# Patient Record
Sex: FEMALE | Race: WHITE | NOT HISPANIC OR LATINO | Employment: FULL TIME
[De-identification: names, ages, dates, MRNs, and addresses within clinical notes are randomized per-mention and may not be internally consistent; named-entity substitution may affect disease eponyms.]

---

## 2017-02-01 ENCOUNTER — RECORDS - HEALTHEAST (OUTPATIENT)
Dept: ADMINISTRATIVE | Facility: OTHER | Age: 27
End: 2017-02-01

## 2018-12-28 ENCOUNTER — RECORDS - HEALTHEAST (OUTPATIENT)
Dept: LAB | Facility: CLINIC | Age: 28
End: 2018-12-28

## 2018-12-29 LAB
ALBUMIN SERPL-MCNC: 3.8 G/DL (ref 3.5–5)
ALP SERPL-CCNC: 73 U/L (ref 45–120)
ALT SERPL W P-5'-P-CCNC: 31 U/L (ref 0–45)
AST SERPL W P-5'-P-CCNC: 21 U/L (ref 0–40)
BILIRUB DIRECT SERPL-MCNC: 0.2 MG/DL
BILIRUB SERPL-MCNC: 0.5 MG/DL (ref 0–1)
PROT SERPL-MCNC: 6.8 G/DL (ref 6–8)

## 2019-01-02 ENCOUNTER — RECORDS - HEALTHEAST (OUTPATIENT)
Dept: LAB | Facility: CLINIC | Age: 29
End: 2019-01-02

## 2019-01-02 LAB
ALBUMIN SERPL-MCNC: 3.8 G/DL (ref 3.5–5)
ALP SERPL-CCNC: 77 U/L (ref 45–120)
ALT SERPL W P-5'-P-CCNC: 28 U/L (ref 0–45)
ANION GAP SERPL CALCULATED.3IONS-SCNC: 11 MMOL/L (ref 5–18)
AST SERPL W P-5'-P-CCNC: 19 U/L (ref 0–40)
BILIRUB SERPL-MCNC: 0.5 MG/DL (ref 0–1)
BUN SERPL-MCNC: 9 MG/DL (ref 8–22)
CALCIUM SERPL-MCNC: 9.4 MG/DL (ref 8.5–10.5)
CHLORIDE BLD-SCNC: 108 MMOL/L (ref 98–107)
CO2 SERPL-SCNC: 22 MMOL/L (ref 22–31)
CREAT SERPL-MCNC: 0.85 MG/DL (ref 0.6–1.1)
GFR SERPL CREATININE-BSD FRML MDRD: >60 ML/MIN/1.73M2
GLUCOSE BLD-MCNC: 99 MG/DL (ref 70–125)
POTASSIUM BLD-SCNC: 4.4 MMOL/L (ref 3.5–5)
PROT SERPL-MCNC: 6.9 G/DL (ref 6–8)
SODIUM SERPL-SCNC: 141 MMOL/L (ref 136–145)

## 2019-03-10 ENCOUNTER — RECORDS - HEALTHEAST (OUTPATIENT)
Dept: LAB | Facility: CLINIC | Age: 29
End: 2019-03-10

## 2019-03-12 LAB — BACTERIA SPEC CULT: ABNORMAL

## 2019-03-21 ENCOUNTER — RECORDS - HEALTHEAST (OUTPATIENT)
Dept: LAB | Facility: CLINIC | Age: 29
End: 2019-03-21

## 2019-03-24 LAB — BACTERIA SPEC CULT: ABNORMAL

## 2020-02-03 ENCOUNTER — RECORDS - HEALTHEAST (OUTPATIENT)
Dept: LAB | Facility: CLINIC | Age: 30
End: 2020-02-03

## 2020-02-03 LAB
ALBUMIN SERPL-MCNC: 3.9 G/DL (ref 3.5–5)
ALP SERPL-CCNC: 73 U/L (ref 45–120)
ALT SERPL W P-5'-P-CCNC: 25 U/L (ref 0–45)
ANION GAP SERPL CALCULATED.3IONS-SCNC: 7 MMOL/L (ref 5–18)
AST SERPL W P-5'-P-CCNC: 20 U/L (ref 0–40)
BILIRUB SERPL-MCNC: 0.5 MG/DL (ref 0–1)
BUN SERPL-MCNC: 7 MG/DL (ref 8–22)
CALCIUM SERPL-MCNC: 9.4 MG/DL (ref 8.5–10.5)
CHLORIDE BLD-SCNC: 104 MMOL/L (ref 98–107)
CHOLEST SERPL-MCNC: 131 MG/DL
CO2 SERPL-SCNC: 27 MMOL/L (ref 22–31)
CREAT SERPL-MCNC: 0.78 MG/DL (ref 0.6–1.1)
FASTING STATUS PATIENT QL REPORTED: NO
GFR SERPL CREATININE-BSD FRML MDRD: >60 ML/MIN/1.73M2
GLUCOSE BLD-MCNC: 82 MG/DL (ref 70–125)
HDLC SERPL-MCNC: 44 MG/DL
LDLC SERPL CALC-MCNC: 75 MG/DL
POTASSIUM BLD-SCNC: 4.1 MMOL/L (ref 3.5–5)
PROT SERPL-MCNC: 7 G/DL (ref 6–8)
SODIUM SERPL-SCNC: 138 MMOL/L (ref 136–145)
TRIGL SERPL-MCNC: 62 MG/DL

## 2020-02-04 LAB — 25(OH)D3 SERPL-MCNC: 12.4 NG/ML (ref 30–80)

## 2020-02-05 LAB
BKR LAB AP ABNORMAL BLEEDING: NO
BKR LAB AP BIRTH CONTROL/HORMONES: NORMAL
BKR LAB AP CERVICAL APPEARANCE: NORMAL
BKR LAB AP GYN ADEQUACY: NORMAL
BKR LAB AP GYN INTERPRETATION: NORMAL
BKR LAB AP GYN OTHER FINDINGS: NORMAL
BKR LAB AP HPV REFLEX: NORMAL
BKR LAB AP LMP: NORMAL
BKR LAB AP PATIENT STATUS: NORMAL
BKR LAB AP PREVIOUS ABNORMAL: NORMAL
BKR LAB AP PREVIOUS NORMAL: NORMAL
HIGH RISK?: NO
PATH REPORT.COMMENTS IMP SPEC: NORMAL
RESULT FLAG (HE HISTORICAL CONVERSION): NORMAL

## 2020-02-27 ENCOUNTER — RECORDS - HEALTHEAST (OUTPATIENT)
Dept: ADMINISTRATIVE | Facility: OTHER | Age: 30
End: 2020-02-27

## 2020-02-28 ENCOUNTER — RECORDS - HEALTHEAST (OUTPATIENT)
Dept: LAB | Facility: CLINIC | Age: 30
End: 2020-02-28

## 2020-02-28 LAB
APPEARANCE FLD: ABNORMAL
COLOR FLD: ABNORMAL
CRYSTALS SNV MICRO: ABNORMAL
GLUCOSE FLD-MCNC: 54 MG/DL
LYMPHOCYTES NFR FLD MANUAL: 6 %
MACROPHAGE % - HISTORICAL: 2 %
MONOCYTE % - HISTORICAL: 2 %
NEUTS BAND NFR FLD MANUAL: 91 %
PROT FLD-MCNC: 3.6 G/DL
RBC FLUID - HISTORICAL: ABNORMAL /UL
WBC # FLD AUTO: 3493 /UL (ref 0–99)

## 2020-03-10 ENCOUNTER — RECORDS - HEALTHEAST (OUTPATIENT)
Dept: ADMINISTRATIVE | Facility: OTHER | Age: 30
End: 2020-03-10

## 2020-03-12 ENCOUNTER — COMMUNICATION - HEALTHEAST (OUTPATIENT)
Dept: TELEHEALTH | Facility: CLINIC | Age: 30
End: 2020-03-12

## 2020-03-12 ENCOUNTER — HOSPITAL ENCOUNTER (OUTPATIENT)
Dept: ULTRASOUND IMAGING | Facility: CLINIC | Age: 30
Discharge: HOME | End: 2020-03-12
Attending: ORTHOPAEDIC SURGERY | Admitting: RADIOLOGY

## 2020-03-12 DIAGNOSIS — M25.469 SWELLING OF KNEE: ICD-10-CM

## 2020-03-12 DIAGNOSIS — M25.562 LEFT KNEE PAIN: ICD-10-CM

## 2020-03-12 LAB
APPEARANCE FLD: ABNORMAL
COLOR FLD: ABNORMAL
EOSINOPHIL NFR FLD MANUAL: ABNORMAL %
LYMPHOCYTES NFR FLD MANUAL: 62 %
MACROPHAGE % - HISTORICAL: 3 %
MESOTHELIALS, FLUID: ABNORMAL
MONOCYTE % - HISTORICAL: ABNORMAL
NEUTS BAND NFR FLD MANUAL: 35 %
OTHER CELLS FLD MANUAL: ABNORMAL
RBC FLUID - HISTORICAL: ABNORMAL /UL
WBC # FLD AUTO: ABNORMAL /UL (ref 0–99)

## 2020-03-15 LAB
BACTERIA SPEC CULT: NO GROWTH
BACTERIA SPEC CULT: NORMAL
GRAM STAIN RESULT: NORMAL
GRAM STAIN RESULT: NORMAL

## 2020-05-20 ENCOUNTER — RECORDS - HEALTHEAST (OUTPATIENT)
Dept: LAB | Facility: HOSPITAL | Age: 30
End: 2020-05-20

## 2020-05-20 LAB
BASOPHILS # BLD AUTO: 0.1 THOU/UL (ref 0–0.2)
BASOPHILS NFR BLD AUTO: 1 % (ref 0–2)
C REACTIVE PROTEIN LHE: 0.7 MG/DL (ref 0–0.8)
EOSINOPHIL # BLD AUTO: 0.2 THOU/UL (ref 0–0.4)
EOSINOPHIL NFR BLD AUTO: 2 % (ref 0–6)
ERYTHROCYTE [DISTWIDTH] IN BLOOD BY AUTOMATED COUNT: 12.6 % (ref 11–14.5)
ERYTHROCYTE [SEDIMENTATION RATE] IN BLOOD BY WESTERGREN METHOD: 14 MM/HR (ref 0–20)
HCT VFR BLD AUTO: 42.1 % (ref 35–47)
HGB BLD-MCNC: 13.6 G/DL (ref 12–16)
LYMPHOCYTES # BLD AUTO: 2.6 THOU/UL (ref 0.8–4.4)
LYMPHOCYTES NFR BLD AUTO: 36 % (ref 20–40)
MCH RBC QN AUTO: 31.2 PG (ref 27–34)
MCHC RBC AUTO-ENTMCNC: 32.3 G/DL (ref 32–36)
MCV RBC AUTO: 97 FL (ref 80–100)
MONOCYTES # BLD AUTO: 0.6 THOU/UL (ref 0–0.9)
MONOCYTES NFR BLD AUTO: 9 % (ref 2–10)
NEUTROPHILS # BLD AUTO: 3.9 THOU/UL (ref 2–7.7)
NEUTROPHILS NFR BLD AUTO: 53 % (ref 50–70)
PLATELET # BLD AUTO: 270 THOU/UL (ref 140–440)
PMV BLD AUTO: 9.9 FL (ref 8.5–12.5)
RBC # BLD AUTO: 4.36 MILL/UL (ref 3.8–5.4)
RHEUMATOID FACT SERPL-ACNC: <15 IU/ML (ref 0–30)
WBC: 7.4 THOU/UL (ref 4–11)

## 2020-05-22 LAB — ANA SER QL: 1.9 U

## 2020-06-01 ENCOUNTER — RECORDS - HEALTHEAST (OUTPATIENT)
Dept: LAB | Facility: CLINIC | Age: 30
End: 2020-06-01

## 2020-06-02 LAB — 25(OH)D3 SERPL-MCNC: 22.1 NG/ML (ref 30–80)

## 2020-08-13 ENCOUNTER — RECORDS - HEALTHEAST (OUTPATIENT)
Dept: ADMINISTRATIVE | Facility: OTHER | Age: 30
End: 2020-08-13

## 2020-08-13 ENCOUNTER — AMBULATORY - HEALTHEAST (OUTPATIENT)
Dept: SURGERY | Facility: CLINIC | Age: 30
End: 2020-08-13

## 2020-08-13 DIAGNOSIS — Z11.59 ENCOUNTER FOR SCREENING FOR OTHER VIRAL DISEASES: ICD-10-CM

## 2020-10-08 ENCOUNTER — RECORDS - HEALTHEAST (OUTPATIENT)
Dept: ADMINISTRATIVE | Facility: OTHER | Age: 30
End: 2020-10-08

## 2020-10-15 ENCOUNTER — RECORDS - HEALTHEAST (OUTPATIENT)
Dept: ADMINISTRATIVE | Facility: OTHER | Age: 30
End: 2020-10-15

## 2020-10-15 RX ORDER — ASCORBIC ACID 125 MG
2 TABLET,CHEWABLE ORAL DAILY
Status: ON HOLD | COMMUNITY
Start: 2020-10-15 | End: 2023-08-23

## 2020-10-15 RX ORDER — PHENOL 1.4 %
10 AEROSOL, SPRAY (ML) MUCOUS MEMBRANE AT BEDTIME
Status: SHIPPED | COMMUNITY
Start: 2020-10-15 | End: 2023-04-04

## 2020-10-15 RX ORDER — LACTOBACILLUS RHAMNOSUS GG 15B CELL
1 CAPSULE, SPRINKLE ORAL 2 TIMES DAILY WITH MEALS
Status: SHIPPED | COMMUNITY
Start: 2020-10-15 | End: 2023-04-04

## 2020-10-15 RX ORDER — TRAZODONE HYDROCHLORIDE 50 MG/1
50 TABLET, FILM COATED ORAL AT BEDTIME
Status: ON HOLD | COMMUNITY
Start: 2020-10-15 | End: 2023-08-22

## 2020-10-15 RX ORDER — ETONOGESTREL 68 MG/1
1 IMPLANT SUBCUTANEOUS ONCE
Status: SHIPPED | COMMUNITY
Start: 2020-10-15 | End: 2024-02-07 | Stop reason: ALTCHOICE

## 2020-10-15 RX ORDER — ALBUTEROL SULFATE 90 UG/1
2 AEROSOL, METERED RESPIRATORY (INHALATION) EVERY 6 HOURS PRN
Status: SHIPPED | COMMUNITY
Start: 2020-10-15 | End: 2023-04-04

## 2020-10-20 ENCOUNTER — RECORDS - HEALTHEAST (OUTPATIENT)
Dept: LAB | Facility: HOSPITAL | Age: 30
End: 2020-10-20

## 2020-10-20 ENCOUNTER — SURGERY - HEALTHEAST (OUTPATIENT)
Dept: SURGERY | Facility: CLINIC | Age: 30
End: 2020-10-20
Payer: COMMERCIAL

## 2020-10-20 LAB
ACID FAST STN SPEC QL: NORMAL
ORGANISMS/SLIDE: NORMAL

## 2020-11-12 LAB — BACTERIA SPEC CULT: NORMAL

## 2020-12-02 LAB — BACTERIA SPEC CULT: NORMAL

## 2020-12-08 ENCOUNTER — RECORDS - HEALTHEAST (OUTPATIENT)
Dept: ADMINISTRATIVE | Facility: OTHER | Age: 30
End: 2020-12-08

## 2020-12-08 LAB
LAB AP CHARGES (HE HISTORICAL CONVERSION): NORMAL
PATH REPORT.ADDENDUM SPEC: NORMAL
PATH REPORT.COMMENTS IMP SPEC: NORMAL
PATH REPORT.FINAL DX SPEC: NORMAL
PATH REPORT.GROSS SPEC: NORMAL
PATH REPORT.MICROSCOPIC SPEC OTHER STN: NORMAL
PATH REPORT.RELEVANT HX SPEC: NORMAL
RESULT FLAG (HE HISTORICAL CONVERSION): NORMAL

## 2021-03-25 ENCOUNTER — RECORDS - HEALTHEAST (OUTPATIENT)
Dept: ADMINISTRATIVE | Facility: OTHER | Age: 31
End: 2021-03-25

## 2021-04-07 ENCOUNTER — RECORDS - HEALTHEAST (OUTPATIENT)
Dept: ADMINISTRATIVE | Facility: OTHER | Age: 31
End: 2021-04-07

## 2021-04-08 ENCOUNTER — AMBULATORY - HEALTHEAST (OUTPATIENT)
Dept: ADMINISTRATIVE | Facility: CLINIC | Age: 31
End: 2021-04-08

## 2021-04-08 DIAGNOSIS — R10.10 UPPER ABDOMINAL PAIN, UNSPECIFIED: ICD-10-CM

## 2021-04-08 DIAGNOSIS — K82.8 GALLBLADDER SLUDGE: ICD-10-CM

## 2021-04-13 ENCOUNTER — RECORDS - HEALTHEAST (OUTPATIENT)
Dept: RADIOLOGY | Facility: CLINIC | Age: 31
End: 2021-04-13

## 2021-04-15 ENCOUNTER — OFFICE VISIT - HEALTHEAST (OUTPATIENT)
Dept: SURGERY | Facility: CLINIC | Age: 31
End: 2021-04-15

## 2021-04-15 ENCOUNTER — COMMUNICATION - HEALTHEAST (OUTPATIENT)
Dept: SURGERY | Facility: CLINIC | Age: 31
End: 2021-04-15

## 2021-04-15 DIAGNOSIS — K80.20 CALCULUS OF GALLBLADDER WITHOUT CHOLECYSTITIS WITHOUT OBSTRUCTION: ICD-10-CM

## 2021-04-15 DIAGNOSIS — E66.01 MORBID OBESITY (H): ICD-10-CM

## 2021-04-15 ASSESSMENT — MIFFLIN-ST. JEOR: SCORE: 2074.86

## 2021-04-16 ENCOUNTER — AMBULATORY - HEALTHEAST (OUTPATIENT)
Dept: SURGERY | Facility: AMBULATORY SURGERY CENTER | Age: 31
End: 2021-04-16

## 2021-04-16 DIAGNOSIS — Z11.59 ENCOUNTER FOR SCREENING FOR OTHER VIRAL DISEASES: ICD-10-CM

## 2021-04-26 ENCOUNTER — RECORDS - HEALTHEAST (OUTPATIENT)
Dept: ADMINISTRATIVE | Facility: OTHER | Age: 31
End: 2021-04-26

## 2021-04-26 ENCOUNTER — AMBULATORY - HEALTHEAST (OUTPATIENT)
Dept: LAB | Facility: CLINIC | Age: 31
End: 2021-04-26

## 2021-04-26 DIAGNOSIS — Z11.59 ENCOUNTER FOR SCREENING FOR OTHER VIRAL DISEASES: ICD-10-CM

## 2021-04-26 ASSESSMENT — MIFFLIN-ST. JEOR
SCORE: 2074.86
SCORE: 2074.86

## 2021-04-27 ENCOUNTER — ANESTHESIA - HEALTHEAST (OUTPATIENT)
Dept: SURGERY | Facility: AMBULATORY SURGERY CENTER | Age: 31
End: 2021-04-27

## 2021-04-27 LAB
SARS-COV-2 PCR COMMENT: NORMAL
SARS-COV-2 RNA SPEC QL NAA+PROBE: NEGATIVE
SARS-COV-2 VIRUS SPECIMEN SOURCE: NORMAL

## 2021-04-28 ENCOUNTER — HOSPITAL ENCOUNTER (OUTPATIENT)
Dept: SURGERY | Facility: AMBULATORY SURGERY CENTER | Age: 31
Discharge: HOME | End: 2021-04-28
Attending: SURGERY | Admitting: SURGERY
Payer: COMMERCIAL

## 2021-04-28 ENCOUNTER — RECORDS - HEALTHEAST (OUTPATIENT)
Dept: ADMINISTRATIVE | Facility: OTHER | Age: 31
End: 2021-04-28

## 2021-04-28 ENCOUNTER — COMMUNICATION - HEALTHEAST (OUTPATIENT)
Dept: SCHEDULING | Facility: CLINIC | Age: 31
End: 2021-04-28

## 2021-04-28 ENCOUNTER — SURGERY - HEALTHEAST (OUTPATIENT)
Dept: SURGERY | Facility: AMBULATORY SURGERY CENTER | Age: 31
End: 2021-04-28
Payer: COMMERCIAL

## 2021-04-28 DIAGNOSIS — K80.20 CALCULUS OF GALLBLADDER WITHOUT CHOLECYSTITIS WITHOUT OBSTRUCTION: ICD-10-CM

## 2021-04-28 LAB
DIPSTICK EXPIRATION DATE - HISTORICAL: NORMAL
DIPSTICK LOT NUMBER - HISTORICAL: NORMAL
POC PREG URINE (HCG) HE - HISTORICAL: NEGATIVE
POC SPECIFIC GRAVITY, URINE - HISTORICAL: NORMAL
POCT KIT EXPIRATION DATE - HISTORICAL: NORMAL
POCT KIT LOT NUMBER HE - HISTORICAL: NORMAL
POCT NEGATIVE CONTROL HE - HISTORICAL: NORMAL
POCT POSITIVE CONTROL HE - HISTORICAL: NORMAL

## 2021-04-28 RX ORDER — TRAMADOL HYDROCHLORIDE 50 MG/1
50 TABLET ORAL EVERY 6 HOURS PRN
Qty: 10 TABLET | Refills: 0 | Status: SHIPPED | OUTPATIENT
Start: 2021-04-28 | End: 2023-04-04

## 2021-04-28 ASSESSMENT — MIFFLIN-ST. JEOR
SCORE: 2074.86
SCORE: 2074.86

## 2021-05-11 ENCOUNTER — OFFICE VISIT - HEALTHEAST (OUTPATIENT)
Dept: SURGERY | Facility: CLINIC | Age: 31
End: 2021-05-11

## 2021-05-11 DIAGNOSIS — E66.01 MORBID OBESITY (H): ICD-10-CM

## 2021-05-11 DIAGNOSIS — L40.50 PSORIATIC ARTHRITIS (H): ICD-10-CM

## 2021-05-11 DIAGNOSIS — K21.9 GASTROESOPHAGEAL REFLUX DISEASE WITHOUT ESOPHAGITIS: ICD-10-CM

## 2021-05-11 DIAGNOSIS — K76.0 FATTY INFILTRATION OF LIVER: ICD-10-CM

## 2021-05-11 DIAGNOSIS — G47.33 OSA (OBSTRUCTIVE SLEEP APNEA): ICD-10-CM

## 2021-05-11 RX ORDER — LEUCOVORIN CALCIUM 5 MG/1
5 TABLET ORAL WEEKLY
Status: SHIPPED | COMMUNITY
Start: 2021-04-25 | End: 2023-04-04

## 2021-05-11 RX ORDER — BUPROPION HYDROCHLORIDE 300 MG/1
300 TABLET ORAL EVERY MORNING
Status: ON HOLD | COMMUNITY
Start: 2021-05-07 | End: 2023-08-23

## 2021-05-11 ASSESSMENT — MIFFLIN-ST. JEOR: SCORE: 2052.18

## 2021-05-13 ENCOUNTER — COMMUNICATION - HEALTHEAST (OUTPATIENT)
Dept: SURGERY | Facility: CLINIC | Age: 31
End: 2021-05-13
Payer: COMMERCIAL

## 2021-05-17 ENCOUNTER — OFFICE VISIT - HEALTHEAST (OUTPATIENT)
Dept: SURGERY | Facility: CLINIC | Age: 31
End: 2021-05-17

## 2021-05-17 DIAGNOSIS — E66.01 MORBID OBESITY (H): ICD-10-CM

## 2021-05-19 ENCOUNTER — AMBULATORY - HEALTHEAST (OUTPATIENT)
Dept: LAB | Facility: CLINIC | Age: 31
End: 2021-05-19

## 2021-05-19 DIAGNOSIS — E66.01 MORBID OBESITY (H): ICD-10-CM

## 2021-05-19 LAB
FERRITIN SERPL-MCNC: 60 NG/ML (ref 10–130)
FOLATE SERPL-MCNC: 16.1 NG/ML
HBA1C MFR BLD: 5.1 %
PTH-INTACT SERPL-MCNC: 51 PG/ML (ref 10–86)
TSH SERPL DL<=0.005 MIU/L-ACNC: 1.51 UIU/ML (ref 0.3–5)
VIT B12 SERPL-MCNC: 366 PG/ML (ref 213–816)

## 2021-05-20 LAB
25(OH)D3 SERPL-MCNC: 62.9 NG/ML (ref 30–80)
25(OH)D3 SERPL-MCNC: 62.9 NG/ML (ref 30–80)

## 2021-05-21 LAB — ZINC SERPL-MCNC: 68.4 UG/DL (ref 60–120)

## 2021-05-23 LAB
ANNOTATION COMMENT IMP: NORMAL
VIT A SERPL-MCNC: 0.53 MG/L (ref 0.3–1.2)
VITAMIN A (RETINYL PALMITATE): <0.02 MG/L (ref 0–0.1)

## 2021-05-24 ENCOUNTER — OFFICE VISIT - HEALTHEAST (OUTPATIENT)
Dept: SURGERY | Facility: CLINIC | Age: 31
End: 2021-05-24

## 2021-05-24 DIAGNOSIS — K21.9 GASTROESOPHAGEAL REFLUX DISEASE WITHOUT ESOPHAGITIS: ICD-10-CM

## 2021-05-24 DIAGNOSIS — E66.01 CLASS 3 SEVERE OBESITY DUE TO EXCESS CALORIES WITH SERIOUS COMORBIDITY AND BODY MASS INDEX (BMI) OF 45.0 TO 49.9 IN ADULT (H): ICD-10-CM

## 2021-05-24 DIAGNOSIS — Z71.3 NUTRITIONAL COUNSELING: ICD-10-CM

## 2021-05-24 DIAGNOSIS — E66.813 CLASS 3 SEVERE OBESITY DUE TO EXCESS CALORIES WITH SERIOUS COMORBIDITY AND BODY MASS INDEX (BMI) OF 45.0 TO 49.9 IN ADULT (H): ICD-10-CM

## 2021-05-26 LAB — VIT B1 PYROPHOSHATE BLD-SCNC: 109 NMOL/L (ref 70–180)

## 2021-05-27 ENCOUNTER — RECORDS - HEALTHEAST (OUTPATIENT)
Dept: ADMINISTRATIVE | Facility: CLINIC | Age: 31
End: 2021-05-27

## 2021-05-30 ENCOUNTER — RECORDS - HEALTHEAST (OUTPATIENT)
Dept: ADMINISTRATIVE | Facility: CLINIC | Age: 31
End: 2021-05-30

## 2021-06-01 ENCOUNTER — RECORDS - HEALTHEAST (OUTPATIENT)
Dept: ADMINISTRATIVE | Facility: CLINIC | Age: 31
End: 2021-06-01

## 2021-06-02 ENCOUNTER — OFFICE VISIT - HEALTHEAST (OUTPATIENT)
Dept: SURGERY | Facility: CLINIC | Age: 31
End: 2021-06-02

## 2021-06-02 DIAGNOSIS — E66.01 MORBID OBESITY (H): ICD-10-CM

## 2021-06-03 ENCOUNTER — RECORDS - HEALTHEAST (OUTPATIENT)
Dept: ADMINISTRATIVE | Facility: CLINIC | Age: 31
End: 2021-06-03

## 2021-06-05 VITALS — BODY MASS INDEX: 47.61 KG/M2 | WEIGHT: 293 LBS

## 2021-06-05 VITALS
WEIGHT: 293 LBS | WEIGHT: 293 LBS | HEIGHT: 66 IN | BODY MASS INDEX: 47.09 KG/M2 | BODY MASS INDEX: 47.61 KG/M2 | HEIGHT: 66 IN | BODY MASS INDEX: 47.61 KG/M2

## 2021-06-05 VITALS — BODY MASS INDEX: 47.61 KG/M2 | HEIGHT: 66 IN

## 2021-06-16 PROBLEM — K21.9 GASTROESOPHAGEAL REFLUX DISEASE WITHOUT ESOPHAGITIS: Status: ACTIVE | Noted: 2021-05-11

## 2021-06-16 PROBLEM — E66.01 MORBID OBESITY (H): Status: ACTIVE | Noted: 2021-04-15

## 2021-06-16 PROBLEM — K80.20 CALCULUS OF GALLBLADDER WITHOUT CHOLECYSTITIS WITHOUT OBSTRUCTION: Status: ACTIVE | Noted: 2021-04-15

## 2021-06-16 PROBLEM — G47.33 OSA (OBSTRUCTIVE SLEEP APNEA): Status: ACTIVE | Noted: 2021-05-11

## 2021-06-16 PROBLEM — L40.50 PSORIATIC ARTHRITIS (H): Status: ACTIVE | Noted: 2021-05-11

## 2021-06-16 NOTE — PATIENT INSTRUCTIONS - HE
Gallbladder education & surgery packet provided to pt.    Luis Fernando SALAS [facility]  Surgery Clinic [facility]  Weight Management Clinic - [address] [address]  Office: [phone]  Fax: [phone]

## 2021-06-16 NOTE — PROGRESS NOTES
HPI: Ernestina Merlos is a 30 y.o. female referred to see me by Deann Luciano PA-C for right upper quadrant pain.  She notes a month-long history of right upper quadrant pain described as dull but intermittently sharp, with 2 episodes of fairly severe epigastric pain that led to a visit to the emergency room last month.  There she was noted to have some mild elevation of her LFTs in the presence of some sludge in her gallbladder.  She notes that this pain was different than her baseline gastroesophageal reflux discomfort.    She followed up with [facility] where she underwent upper endoscopy which demonstrated a small hiatal hernia but no evidence of peptic ulcer disease or gastritis.  Has continued to have abdominal pain, so given the findings of cholelithiasis on her ultrasound imaging she was referred to us for discussion regarding cholecystectomy.    Allergies:Amoxicillin, Penicillins, Septra [sulfamethoxazole-trimethoprim], and Sulfamethizole    Past Medical History:   Diagnosis Date     ADHD      Chicken pox 1997     Dysmenorrhea      GERD (gastroesophageal reflux disease)      Microscopic colitis      Panic attack      Raynaud disease      Sleep apnea     uses CPAP     Vitamin D deficiency        Past Surgical History:   Procedure Laterality Date     US ASPIRATION HEMATOMA SEROMA OR FLUID COLLECTION  3/12/2020       CURRENT MEDS:    Current Outpatient Medications:      albuterol (PROAIR HFA;PROVENTIL HFA;VENTOLIN HFA) 90 mcg/actuation inhaler, Inhale 2 puffs every 6 (six) hours as needed for wheezing., Disp: , Rfl:      budesonide (ENTOCORT EC) 3 mg 24 hr capsule, Take 9 mg by mouth every morning., Disp: , Rfl:      buPROPion (WELLBUTRIN SR) 150 MG 12 hr tablet, Take 150 mg by mouth 2 (two) times a day., Disp: , Rfl:      cholecalciferol, vitamin D3, (VITAMIN D3) 5,000 unit Tab, Take by mouth. 1-2 caps daily, Disp: , Rfl:      esomeprazole (NEXIUM) 20 MG capsule, Take 20 mg by mouth daily  "before breakfast., Disp: , Rfl:      etonogestreL (NEXPLANON) 68 mg Impl implant, 1 each by Subdermal route once., Disp: , Rfl:      Lactobacillus rhamnosus GG (CULTURELLE) 15 billion cell CpSP, Take 1 capsule by mouth 2 (two) times a day with meals., Disp: , Rfl:      magnesium citrate 125 mg cap, Take 2 capsules by mouth daily., Disp: , Rfl:      melatonin 10 mg Tab, Take 10 mg by mouth at bedtime., Disp: , Rfl:      meloxicam (MOBIC) 7.5 MG tablet, Take 7.5 mg by mouth daily., Disp: , Rfl:      traZODone (DESYREL) 50 MG tablet, Take 50 mg by mouth at bedtime., Disp: , Rfl:     Family History   Problem Relation Age of Onset     Skin cancer Mother      Crohn's disease Mother      Colitis Mother      Hypertension Father         reports that she has never smoked. She has never used smokeless tobacco.    Review of Systems:  The 10 point review of systems  is within normal limits except for as mentioned above in the HPI.  General ROS: No complaints or constitutional symptoms  Skin: No complaints or symptoms   Hematologic/Lymphatic: No symptoms or complaints  Psychiatric: No symptoms or complaints  Endocrine: No excessive fatigue, no hypermetabolic symptoms reported  Respiratory ROS: no cough, shortness of breath, or wheezing  Cardiovascular ROS: no chest pain or dyspnea on exertion  Gastrointestinal ROS: As per HPI  Musculoskeletal ROS: no recent injuries reported  Neurological ROS: no focal neurologic defects reported.        /80   Ht 5' 6\" (1.676 m)   Wt (!) 295 lb (133.8 kg)   Breastfeeding No   BMI 47.61 kg/m    Body mass index is 47.61 kg/m .    EXAM:  General : Alert, cooperative, appears stated age   Skin: Skin color, texture, turgor normal, no rashes or lesions   Lymphatic: No obvious adenopathy, no swelling   Eyes: No scleral icterus, pupils equal  HENT: no traumatic injury to the head or face, no gross abnormalities  Lungs: Normal respiratory effort  Heart: Regular rate and rhythm  Abdomen: Soft, " nondistended.  Nontender in the epigastric region, but some tenderness with palpation of the right upper quadrant  Musculoskeletal: No obvious swelling  Neurologic: Grossly intact      LABS:  Lab Results   Component Value Date    WBC 7.4 05/20/2020    HGB 13.6 05/20/2020    HCT 42.1 05/20/2020    MCV 97 05/20/2020     05/20/2020     INR/Prothrombin Time      Lab Results   Component Value Date    ALT 25 02/03/2020    AST 20 02/03/2020    ALKPHOS 73 02/03/2020    BILITOT 0.5 02/03/2020       IMAGES:   Relevant images were reviewed and discussed with the patient.  Notable findings were from ultrasound imaging obtained March 18, 2020.  The presence of sludge within an otherwise normal-appearing gallbladder.  Her liver was noted to have increased echogenicity consistent with nonalcoholic fatty liver disease and her liver did appear enlarged on ultrasound imaging.    Assessment/Plan:   1. Calculus of gallbladder without cholecystitis without obstruction    2. Morbid obesity (H)        Ernestina Merlos is a 30 y.o. female with signs and symptoms consistent with biliary colic.  I have explained the pathophysiology of bile formation, storage and excretion in detail as well as the surgical versus non-operative management strategies.      The risks of surgery were discussed in detail which include, but are not limited to, bleeding, infection, injury to surrounding structures, the need to convert to an open procedure, blood clots, stroke, heart attack and death.  Specifically we discussed the risk of damage to the common bile duct and hepatic vessels, and the complications that may arise from damage to these structures.  Additionally, the risks of non operative management were discussed which include, but are not limited to, worsening infection, increased pain, sepsis and death.     She understands everything which was discussed and has consented to proceed with a laparoscopic cholecystectomy.  We will schedule surgery  accordingly.     We also discussed her obesity, and its role in contributing to both her nonalcoholic fatty liver disease as well as her gastroesophageal reflux disease.  We discussed the causal relationship between obesity and these 2 other medical issues, and whether she had had any interest in seeking medical treatment for her obesity.  She was interested in this, and a referral was placed as well to our comprehensive weight management program    Moise Kim MD, FACS  Office: [phone]  [facility]   General and Bariatric Surgery

## 2021-06-17 ENCOUNTER — COMMUNICATION - HEALTHEAST (OUTPATIENT)
Dept: SURGERY | Facility: CLINIC | Age: 31
End: 2021-06-17
Payer: COMMERCIAL

## 2021-06-17 NOTE — PROGRESS NOTES
Tele-Visit Details    Type of service:  Video Visit    Video Start Time (time video started): 11:00 AM    Video End Time (time video stopped): 11:50 AM    Originating Location (pt. Location): Patient Home    Distant Location (provider location): Home office     Reason for Televisit: Bariatric Psychology-Initial Session    Mode of Communication:  Video Conference via [employer]    Physician has received verbal consent for a video visit from the patient? Yes    Ernestina would like to follow through with bariatric surgery for health reasons. She has struggled with her weight for much of her life and now is concerned about various comorbidities. She has a h/o depression, anxiety and ADHD and is well medicated. She is also in psychotherapy. She has a h/o boredom eating. She has good knowledge of surgery and good support. She will follow up and complete psychological testing. Dx: F32.9; F41.9; F90.9; E66.01    Levy Jimenez

## 2021-06-17 NOTE — PROGRESS NOTES
Ernestina Merlos is a 31 y.o. female who is being evaluated via a billable video visit.       How would you like to obtain your AVS? MyChart.  If dropped from the video visit, the video invitation should be resent by: Text to cell phone: [phone]  Will anyone else be joining your video visit? No     Video Start Time: 2:19 PM        Initial Structured Weight Loss Supervised Diet Evaluation     Assessment:  Ernestina is being seen today for initial RD nutritional evaluation. Patient has been unsuccessful with non-surgical weight loss methods and is interested in bariatric surgery. Today we reviewed current eating habits and level of physical activity, and instructed on the changes that are required for successful bariatric outcomes.    Surgery of interest per pt: Undecided .    Workflow review:  Support Group: Not completed.  Psychology:In progress.  Lab work:Completed.  SWL:Yes 2nd Visit       Weight goal: to be determined at MD appt .    Anthropometrics:  Pt's Weight: (!) 290 lb (131.5 kg)  Current Weight: 290 lbs   BMI: There is no height or weight on file to calculate BMI.   Patient weight not recorded  Estimated RMR (Chitt[place]-St Jeor equation):  2055 kcals x 1.2 (sedentary) = 2466 kcals (for weight maintenance)    Medical History:  Patient Active Problem List   Diagnosis     Morbid obesity (H)     Calculus of gallbladder without cholecystitis without obstruction     Gastroesophageal reflux disease without esophagitis     Psoriatic arthritis (H)     HASMUKH (obstructive sleep apnea)      Diabetes: No   HGBA1c:    Hemoglobin A1c   Date/Time Value Ref Range Status   05/19/2021 03:39 PM 5.1 <=5.6 % Final       Nutrition History  Food allergies/intolerances/cultural or religous food customs: No, however dairy isn't digestive as well (milk)  Vitamins/Mineral currently taking: Vitamin D3, Mg, Probiotic, MVI, Vitamin B12  Socioeconomic Status  Who does the grocery shopping for your household? Self     Who prepares your meals  at home? 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     Intervention    Nutrition Education:   1. Provided general overview of diet and lifestyle modifications needed to be a deemed a safe candidate for bariatric surgery.   2. Educated patient on how to read a food label: choosing foods with than 10 grams fat and 10 grams sugar per serving to avoid dumping syndrome.  3. Dumping Syndrome: Described the mechanisms of syndrome, symptoms, and prevention tools from a dietary perspective.     Food/Nutrient Delivery:  4. Educated patient on eating three meals, with cutting out snacking.  5. Bariatric Plate: Patient and I discussed the importance of including a lean protein source (20-30 grams/meal), vegetables (included at lunch and dinner), one serving (15g) of carbohydrate, and limited added fat (1 tb/day) at each meal.   6. Educated patient on using a protein powder drink as a meal replacement and/or supplement after bariatric surgery.   7. Discussed importance of adequate hydration after surgery, with goal of at least 64 oz of fluids/day.  8. Addressed avoiding all carbonated, caffeinated and sweetened drinks to prepare for bariatric surgery.     Nutrition Counselin. Mindful eating techniques: Encouraged slow meal pace, chewing foods to applesauce consistency  for 20-30 minutes/meal.   10. Discussed  fluids 30 minutes before, during, and after meal to prevent dumping syndrome and discomfort post bariatric surgery.     Instructions/Goals:     1. Start implementing bariatric surgery lifestyle modifications.    Handouts Provided:   [facility] Weight Management Patient Handbook      Monitor/Evaluation:  Pt. s target weight:to be determined at MD appt, patient verbalized understanding.     Plan for next visit:   Bariatric plate. Give food journal homework.  Review Bariatric plate and food journal homework.  Educate on dumping syndrome and reading food labels.  (Final Supervised Diet visit with RD) pre/post-op  diet progression, give review of surgery process.    Video Visit Details:    Type of service:  Video Visit    Video End Time (time video stopped): 2:45 pm   Originating Location (pt. Location): Home    Distant Location (provider location):  [facility] GENERAL SURGERY AND BARIATRICS CARE     Platform used for Video Visit: [employer] (however had to switch to phone visit due to poor connection)     Hayley Rubalcava RD

## 2021-06-17 NOTE — PROGRESS NOTES
.Ernestina Merlos is 31 y.o.  female who presents for a billable video visit today.    How would you like to obtain your AVS? MyChart.  If dropped from the video visit, the video invitation should be resent by: Text to cell phone: [phone]  Will anyone else be joining your video visit? No      Video Start Time: 10:14 AM    Provider Notes:     Outpatient Bariatric Medicine Consultation  Indication: Medical Bariatric Consultation to [facility]  Primary Provider: Deann Luciano PA-C  Requesting Physician: Dr. Kim  Type of Bariatric Surgery: thinking SG but has GERD    Impression Ernestina Merlos is a 31 y.o. year old female with HASMUKH not using CPAP regularly, GERD, microscopic colitis, psoriatric arthritis, and morbid obesity who presents for medical bariatric consultation.    Poor functional capacity and musculoskeletal disability due to morbid obesity which satisfies NIH criteria for bariatric surgery. Her BMI is Body mass index is 46.81 kg/m ..    Ernestina Merlos hopes to achieve resolution of fatty liver and HASMUKH following surgery and significant weight loss.    BARIATRIC RECOMMENDATIONS  Bariatric therapy is indicated for Ernestina Merlos as a means of modifying her obesity related co-morbidities.  Therapeutic lifestyle changes have not lead to significant and or durable weight loss.  Surgical bariatric therapy is most likely to induce significant weight loss, promote long-term weight maintenance and lead to clinical improvement and/or resolution of her weight related co-morbidities.   -Medical Nutrition Therapy is indicated including comprehensive guidance of nutrition and lifestyle management.  -A Bariatric Psychological Assessment and clearance is indicated.  -Screening Bariatric Laboratory studies are indicated.  -Currency of Routine Healthcare Maintenance is indicated.  -Physical Activity Guidance was provided. Follow up of recommendations will be provided.    PERIOPERATIVE  RECOMMENDATIONS  CARDIAC: Cardiac consultation and clearance will be required of patients with significant cardiac disease and/or multiple cardiac risk factors.  PULMONARY: Pre-operative therapy with CPAP/BIPAP is indicated for a minimum of one month for patients with sleep apnea. Complete tobacco abstinence for two months pre-operatively and indefinitely thereafter is required.   RENAL: Diuretics will be discontinued 2 weeks before surgery at the time of liquid diet if the patient is on them at that time.  ENDOCRINE: Optimizing perioperative glycemic control is indicated. Our goal is an AIC of 8 or less at the time of surgery for optimal healing. Patients using insulin will be referred to a [facility] endocrinologist for perioperative insulin management.  GASTROINTESTINAL: Evaluation of the esophagus and stomach by EGD and/or UGI series will be considered in patients with severe GERD, previous weight loss surgery, or other indication.  GYNECOLOGIC: For patients on Estrogen- Estrogen will be discontinued 4 weeks prior to surgery and resumed 4 weeks after surgery unless otherwise advised. Reliable contraception is required post-operatively for 1 year for women of childbearing age. DEPOT PROVERA is contraindicated due to its association with weight gain. Post-operative birth control plan is nexplanon  MUSCULOSKELETAL/RHEUMATOLOGIC: NSAIDS are contraindicated following surgery and lifelong abstinence is indicated. When indicated for cardioprotection or otherwise, patients should use an enteric coated ASA and concomitant proton pump inhibitor.  HEMATOLOGIC: Risks of deep venous thromboembolism have been assessed. Patients with history of DVT/PE or current anti-coagulation will be placed on the High Risk DVT Prophylaxis Protocol. Objections (if any) to receiving blood products if necessary have been documented.  DENTAL: Reasonable and functional dentition is indicated in order to chew food to applesauce consistency  post-operatively.  NUTRITIONAL: Pre and post-operative nutritional and lifestyle modification guidance is indicated. Pre-operative weight reduction can reduce liver volume, improving technical aspects of surgery.    Patient will schedule an in person visit with me in 1 month. At that time, we will get an official weight and measurements, determine weight loss goals prior to surgery and do a PE. We will discuss the available surgeries in more detail.     Total time spent on the date of this encounter doing: chart review, review of test results, patient visit, physical exam, education, counseling, developing plan of care and documenting = 97 minutes.     History Surrounding Consultation  Struggles with weight started at age teenage years  She has had several past supervised and unsupervised weight loss attempts  She has tried weight watchers, intermittent fasting  Unfortunately there was not durable weight maintenance.  History of bulimia, anorexia, or binge eating disorder? no  If Present has eating disorder been in remission at least 3 years? na    Dietary History  Meals per day: 1-2  Snacks: some grazing  Typical Snack: cheese its, candy  Who does the grocery shopping? Patient   Who does the cooking? patient  A Typical meal includes: B: often skips or scrambled eggs or toast or toaster struedel  L: eats out - [employer]onal[employer], [employer], Chipotle  D: sometimes skips- often fast food, or at work at [employer]  Regular Pop: 2-3 cans per day  Juice: orange- a couple glasses a week  Caffeine: soda, occasional starbucks sugared drink ( 2-4 x per week)  Amount of restaurant eating per week: most meals      Physical Activity Patterns  Current physical activity routine includes: Walking at work    Limitations from being physically active on a regular basis includes: weather, time      Past Medical History  HTN: no  Dyslipidemia: no  HASMUKH: yes- not using CPAP consitently  Obesity Hypoventilation: NO  DM2: no DX: no Most recent  "AIC: na  Neuropathy: no  Nephropathy: no  Kidney Stones: no  Ophthalmopathy: no  IFG or \"pre-DM\": no  MI: no  CVA:no  CHF: no  Heart Valves: no  Previous cardiac testing includes: no  Cancers: no  DVT: no  PE: no  Colitis: microscopic colitis  Crohn's: no  IBS: no  PUD: no  UGI/endoscopy: yes  Fatty Liver: yes  Abnormal LFTs: yes  Hepatitis: no  Asthma: no  Bronchitis: yes  Pneumonia: no  Other Lung Problems: no  Back Pain:yes  DDD: no  Gout: no  Fibromyalgia: no  USI: no  Severe Headaches: yes  Seizures: no If so, last seizure: na  PCOS: no  Menstrual Irregularity: has nexplanon  Infertility: na  Thyroid problems: no  Thyroid medications: no  Glaucoma: no  HIV positive: NO  MRSA/VRE history: n  Hx of lower leg ulcers: no  History of Blood transfusion: no  Anemia: no    Pap smear: 2020  Mammogram: no  Colonoscopy: no    Medications   Current Outpatient Medications on File Prior to Visit   Medication Sig Dispense Refill     albuterol (PROAIR HFA;PROVENTIL HFA;VENTOLIN HFA) 90 mcg/actuation inhaler Inhale 2 puffs every 6 (six) hours as needed for wheezing.       budesonide (ENTOCORT EC) 3 mg 24 hr capsule Take 9 mg by mouth every morning.       buPROPion (WELLBUTRIN XL) 300 MG 24 hr tablet Take 300 mg by mouth every morning.       cholecalciferol, vitamin D3, (VITAMIN D3) 5,000 unit Tab Take by mouth. 1-2 caps daily       esomeprazole (NEXIUM) 20 MG capsule Take 20 mg by mouth daily before breakfast.       etonogestreL (NEXPLANON) 68 mg Impl implant 1 each by Subdermal route once.       Lactobacillus rhamnosus GG (CULTURELLE) 15 billion cell CpSP Take 1 capsule by mouth 2 (two) times a day with meals.       leucovorin (WELLCOVORIN) 5 mg tablet Take 5 mg by mouth once a week.       magnesium citrate 125 mg cap Take 2 capsules by mouth daily.       melatonin 10 mg Tab Take 10 mg by mouth at bedtime.       methotrexate 2.5 MG tablet Take by mouth once a week.       traMADoL (ULTRAM) 50 mg tablet Take 1 tablet (50 mg " total) by mouth every 6 (six) hours as needed for pain. 10 tablet 0     traZODone (DESYREL) 50 MG tablet Take 50 mg by mouth at bedtime.       [DISCONTINUED] buPROPion (WELLBUTRIN SR) 150 MG 12 hr tablet Take 150 mg by mouth 2 (two) times a day.       No current facility-administered medications on file prior to visit.      Allergies   Allergies   Allergen Reactions     Amoxicillin Rash     Penicillins Rash     Septra [Sulfamethoxazole-Trimethoprim] Rash     Sulfamethizole Rash     Past Surgical History  Past Surgical History:   Procedure Laterality Date     IL LAP,CHOLECYSTECTOMY N/A 4/28/2021    Procedure: CHOLECYSTECTOMY, LAPAROSCOPIC;  Surgeon: Moise Kim MD;  Location: [facility];  Service: General     US ASPIRATION HEMATOMA SEROMA OR FLUID COLLECTION  3/12/2020     History of problems with anesthesia: no  History of Malignant Hyperthermia: NO    Gynecological History  Current Birth Control: nexplanon    Family History  Family History   Problem Relation Age of Onset     Skin cancer Mother      Crohn's disease Mother      Colitis Mother      Hypertension Father        Social History  Social History     Socioeconomic History     Marital status: Single     Spouse name: None     Number of children: None     Years of education: None     Highest education level: None   Occupational History     None   Social Needs     Financial resource strain: None     Food insecurity     Worry: None     Inability: None     Transportation needs     Medical: None     Non-medical: None   Tobacco Use     Smoking status: Never Smoker     Smokeless tobacco: Never Used   Substance and Sexual Activity     Alcohol use: Yes     Drug use: Not Currently     Sexual activity: None   Lifestyle     Physical activity     Days per week: None     Minutes per session: None     Stress: None   Relationships     Social connections     Talks on phone: None     Gets together: None     Attends Synagogue service: None     Active member of club or  "organization: None     Attends meetings of clubs or organizations: None     Relationship status: None     Intimate partner violence     Fear of current or ex partner: None     Emotionally abused: None     Physically abused: None     Forced sexual activity: None   Other Topics Concern     None   Social History Narrative     None       Addiction History  Smoking History: No  Started smoking: na Quit smoking: na Total years of tobacco use: na  Alcohol use: Yes- occasonal  Current or Past history of alcohol or substance abuse: no  Last used: na  Chemical Dependency Treatment History: no  Chemicals: na    Psychiatric History  Diagnoses: ADHD, anxiety and depression  Treated by: therapy, medication  Psychiatric Hospitalizations: no  ECT: no  Panic attacks: not recently  History of Abuse: no    Palliative Medicine History  Involvement in a pain clinic: no    Dental History  Missing teeth: no  Pending Dental Work: no  Regular Dental Visits: yes  Dentures/Partials: no      ROS  General  Fatigue: yes  Sleep Quality:poor  HEENT  Visual changes: no  Cardiovascular  Chest Pain with Exertion: no  Dyspnea with Exertion: yes  Palpitations: no  Lower Extremity Edema: no  Pulmonary  Shortness of breath at rest: no  Snoring: yes  STOP BANG score: known sleep apnea, not using CPAP  Bastian Score: na  Witnessed Apnea: yes  Wheezing: no  CPAP use: no  Gastrointestinal  Trouble swallowing:no  Heartburn: yes- on nexium  Abdominal pain: recent cholecystectomy  Hematochezia: no  Neurologic  Severe headache:no  Psychiatric  Moods Stable: yes  Rheumatologic  Myalgias: yes  Arthralgias: yes  Musculoskeletal  Back Pain: yes  Knee Pain: yes  Hematologic  Abnormal Bleeding or Clotting: no        Physical Exam  Vitals: Ht 5' 6\" (1.676 m)   Wt (!) 290 lb (131.5 kg)   BMI 46.81 kg/m    Weight:   Wt Readings from Last 4 Encounters:   05/11/21 (!) 290 lb (131.5 kg)   04/28/21 (!) 295 lb (133.8 kg)   04/15/21 (!) 295 lb (133.8 kg)   11/23/14 (!) 230 " "lb (104.3 kg)     Height: 5' 6\" (1.676 m) (5/11/2021  9:00 AM)  Weight: (!) 290 lb (131.5 kg) (5/11/2021  9:00 AM)  BMI (Calculated): 46.8 (5/11/2021  9:00 AM)  SpO2: 98 % (4/28/2021  2:30 PM)    BMI: Body mass index is 46.81 kg/m .    GENERAL: Healthy, alert and no distress  EYES: Eyes grossly normal to inspection. No discharge or erythema, or obvious scleral/conjunctival abnormalities.  RESP: No audible wheeze, cough, or visible cyanosis.  No visible retractions or increased work of breathing.    NEURO: Cranial nerves grossly intact. Mentation and speech appropriate for age.  PSYCH: Mentation appears normal, affect normal/bright, judgement and insight intact, normal speech and appearance well-groomed    We reviewed the important post op bariatric recommendations:  -eating 3 meals daily  -eating protein first, getting >60gm protein daily  -eating slowly, chewing food well  -avoiding/limiting calorie containing beverages  -drinking water 15-30 minutes before or after meals  -choosing wheat, not white with breads, crackers, pastas, leidy, bagels, tortillas, rice  -limiting restaurant or cafeteria eating to twice a week or less    We discussed the importance of restorative sleep and stress management in maintaining a healthy weight.  We discussed the National Weight Control Registry healthy weight maintenance strategies and ways to optimize metabolism.  We discussed the importance of physical activity including cardiovascular and strength training in maintaining a healthier weight.  We discussed the importance of lifelong follow-up.    Ernestina Merlos was reminded that, postoperatively,  to avoid marginal ulcers she should avoid tobacco at all, alcohol in excess, caffeine in excess, and NSAIDS (unless indicated for cardioprotection or othewise and opposed by a PPI).           Much or all of the text in this note was generated through the use of Dragon Dictate voice-to-text software. Errors in spelling or words which " seem out of context are unintentional. Sound alike errors, in particular, may have escaped editing.          Video-Visit Details    Type of service:  Video Visit    Video End Time (time video stopped): 10:53 AM  Originating Location (pt. Location): Home    Distant Location (provider location):  [facility] [place]     Platform used for Video Visit:[employer]

## 2021-06-17 NOTE — ANESTHESIA POSTPROCEDURE EVALUATION
Patient: Ernestina Merlos  Procedure(s):  CHOLECYSTECTOMY, LAPAROSCOPIC  Anesthesia type: general    Patient location: PACU  Last vitals:   Vitals Value Taken Time   /77 04/28/21 1312   Temp 36.6  C (97.8  F) 04/28/21 1312   Pulse 88 04/28/21 1328   Resp 18 04/28/21 1312   SpO2 94 % 04/28/21 1328   Vitals shown include unvalidated device data.  Post vital signs: stable  Level of consciousness: awake and responds to simple questions  Post-anesthesia pain: pain controlled  Post-anesthesia nausea and vomiting: no  Pulmonary: unassisted, return to baseline  Cardiovascular: stable and blood pressure at baseline  Hydration: adequate  Anesthetic events: no    QCDR Measures:  ASA# 11 - Ryanne-op Cardiac Arrest: ASA11B - Patient did NOT experience unanticipated cardiac arrest  ASA# 12 - Ryanne-op Mortality Rate: ASA12B - Patient did NOT die  ASA# 13 - PACU Re-Intubation Rate: ASA13B - Patient did NOT require a new airway mgmt  ASA# 10 - Composite Anes Safety: ASA10A - No serious adverse event    Additional Notes:

## 2021-06-17 NOTE — OP NOTE
Name:  Ernestina Merlos  PCP:  Deann Luciano PA-C  Procedure Date:  4/28/2021      CHOLECYSTECTOMY, LAPAROSCOPIC    Pre-Procedure Diagnosis:  Calculus of gallbladder without cholecystitis without obstruction [K80.20]     Post-Procedure Diagnosis:    cholelithiasis    Surgeon(s):  Moise Kim MD      Anesthesia type: general    Findings:  cholelithiassi    Operative Report:    The patient was brought to the operating room where after induction of general anesthesia with endotracheal and the patient was positioned with the left arm tucked and right arm out.  The patient was then prepped and draped in standard sterile fashion.  After a procedural pause we began by injecting quarter percent Marcaine into the skin immediately adjacent to the umbilicus.  This was then sharply incised.  We then entered with a 5 mm optical trochar.  The patient was then placed in reverse Trendelenburg and right side up the body positioning.  We then proceeded to place 3 additional trochars across the right subcostal margin.      On initial evaluation the gallbladder it appeared without any inflammation.   We began our operation by grasping the fundus the gallbladder and retracting it cephalad over the dome of the liver.  The neck of the gallbladder was then retracted lateral to the right side.  We then began by scoring the peritoneum overlying the triangle of Calot. Using primarily blunt dissection and electrocautery we proceeded to clear the fatty tissues and lymph node away from the triangle of Calot. The cystic duct and cystic artery were skeletonized. A critical view was obtained. With this view obtained we proceeded to place 3 clips each on the cystic artery and cystic duct. The artery and cystic duct which was then divided. We then utilized electrocautery to dissect the remainder of the gallbladder off the gallbladder fossa. Once this was completely removed we inspected the gallbladder fossa for hemostasis which appeared  excellent. The gallbladder was then placed into an Endo Catch bag. All spilled fluid and blood were then aspirated clear from the right upper quadrant and after confirming no active bleeding from the gallbladder fossa the Endo Catch bag with the gallbladder intact was removed through the 12 mm port site. An 0 Vicryl stitch was then placed under laparoscopic guidance in the 12 mm port site.  We then desufflated the abdomen and removed our ports. The preplaced fascial tie was then tied down with excellent obliteration of the fascial defect. The remainder of the local anesthetic was then injected in the skin and fascia surrounding the port sites and the skin closed with running 4-0 subcuticular sutures         Estimated Blood Loss:   * No blood loss documented between In Room and Out of Room log events - 4/28/2021 11:56 AM to 4/28/2021 12:37 PM *    Specimens:    ID Type Source Tests Collected by Time   A :  Tissue Gallbladder SURGICAL PATHOLOGY EXAM Moise Kim MD 4/28/2021 1230          Complications:    None    Moise Kim

## 2021-06-17 NOTE — INTERVAL H&P NOTE
I have performed an assessment and examined the patient, as necessary, to update the patient's current status that may have changed since the prior History and Physical.  The History & Physical has been reviewed and no updates are needed.     Moise Kim MD, FACS  Office: [phone]  [facility]

## 2021-06-17 NOTE — ANESTHESIA PREPROCEDURE EVALUATION
Anesthesia Evaluation      Patient summary reviewed     Airway   Mallampati: II  Neck ROM: full   Pulmonary - normal exam   (+) shortness of breath, sleep apnea,                          Cardiovascular - negative ROS and normal exam   Neuro/Psych - negative ROS     Endo/Other    (+) obesity,      GI/Hepatic/Renal    (+) hiatal hernia, GERD,             Dental - normal exam                        Anesthesia Plan  Planned anesthetic: general endotracheal  Mag sulfate  ketamine  ASA 3   Induction: intravenous   Anesthetic plan and risks discussed with: patient    Post-op plan: routine recovery

## 2021-06-17 NOTE — ANESTHESIA CARE TRANSFER NOTE
Last vitals:   Vitals:    04/28/21 1245   BP: 144/83   Pulse: (!) 109   Resp: 18   Temp: 36.9  C (98.4  F)   SpO2: 99%     Patient's level of consciousness is awake  Spontaneous respirations: yes  Maintains airway independently: yes  Dentition unchanged: yes  Oropharynx: oropharynx clear of all foreign objects    QCDR Measures:  ASA# 20 - Surgical Safety Checklist: WHO surgical safety checklist completed prior to induction    PQRS# 430 - Adult PONV Prevention: 4558F - Pt received => 2 anti-emetic agents (different classes) preop & intraop  ASA# 8 - Peds PONV Prevention: 4558F - Pt received => 2 anti-emetic agents (different classes) preop & intraop  PQRS# 424 - Ryanne-op Temp Management: 4559F - At least one body temp DOCUMENTED => 35.5C or 95.9F within required timeframe  PQRS# 426 - PACU Transfer Protocol: - Transfer of care checklist used  ASA# 14 - Acute Post-op Pain: ASA14B - Patient did NOT experience pain >= 7 out of 10

## 2021-06-17 NOTE — H&P (VIEW-ONLY)
HPI: Ernestina Merlos is a 30 y.o. female referred to see me by Deann Luciano PA-C for right upper quadrant pain.  She notes a month-long history of right upper quadrant pain described as dull but intermittently sharp, with 2 episodes of fairly severe epigastric pain that led to a visit to the emergency room last month.  There she was noted to have some mild elevation of her LFTs in the presence of some sludge in her gallbladder.  She notes that this pain was different than her baseline gastroesophageal reflux discomfort.    She followed up with [facility] where she underwent upper endoscopy which demonstrated a small hiatal hernia but no evidence of peptic ulcer disease or gastritis.  Has continued to have abdominal pain, so given the findings of cholelithiasis on her ultrasound imaging she was referred to us for discussion regarding cholecystectomy.    Allergies:Amoxicillin, Penicillins, Septra [sulfamethoxazole-trimethoprim], and Sulfamethizole    Past Medical History:   Diagnosis Date     ADHD      Chicken pox 1997     Dysmenorrhea      GERD (gastroesophageal reflux disease)      Microscopic colitis      Panic attack      Raynaud disease      Sleep apnea     uses CPAP     Vitamin D deficiency        Past Surgical History:   Procedure Laterality Date     US ASPIRATION HEMATOMA SEROMA OR FLUID COLLECTION  3/12/2020       CURRENT MEDS:    Current Outpatient Medications:      albuterol (PROAIR HFA;PROVENTIL HFA;VENTOLIN HFA) 90 mcg/actuation inhaler, Inhale 2 puffs every 6 (six) hours as needed for wheezing., Disp: , Rfl:      budesonide (ENTOCORT EC) 3 mg 24 hr capsule, Take 9 mg by mouth every morning., Disp: , Rfl:      buPROPion (WELLBUTRIN SR) 150 MG 12 hr tablet, Take 150 mg by mouth 2 (two) times a day., Disp: , Rfl:      cholecalciferol, vitamin D3, (VITAMIN D3) 5,000 unit Tab, Take by mouth. 1-2 caps daily, Disp: , Rfl:      esomeprazole (NEXIUM) 20 MG capsule, Take 20 mg by mouth daily  "before breakfast., Disp: , Rfl:      etonogestreL (NEXPLANON) 68 mg Impl implant, 1 each by Subdermal route once., Disp: , Rfl:      Lactobacillus rhamnosus GG (CULTURELLE) 15 billion cell CpSP, Take 1 capsule by mouth 2 (two) times a day with meals., Disp: , Rfl:      magnesium citrate 125 mg cap, Take 2 capsules by mouth daily., Disp: , Rfl:      melatonin 10 mg Tab, Take 10 mg by mouth at bedtime., Disp: , Rfl:      meloxicam (MOBIC) 7.5 MG tablet, Take 7.5 mg by mouth daily., Disp: , Rfl:      traZODone (DESYREL) 50 MG tablet, Take 50 mg by mouth at bedtime., Disp: , Rfl:     Family History   Problem Relation Age of Onset     Skin cancer Mother      Crohn's disease Mother      Colitis Mother      Hypertension Father         reports that she has never smoked. She has never used smokeless tobacco.    Review of Systems:  The 10 point review of systems  is within normal limits except for as mentioned above in the HPI.  General ROS: No complaints or constitutional symptoms  Skin: No complaints or symptoms   Hematologic/Lymphatic: No symptoms or complaints  Psychiatric: No symptoms or complaints  Endocrine: No excessive fatigue, no hypermetabolic symptoms reported  Respiratory ROS: no cough, shortness of breath, or wheezing  Cardiovascular ROS: no chest pain or dyspnea on exertion  Gastrointestinal ROS: As per HPI  Musculoskeletal ROS: no recent injuries reported  Neurological ROS: no focal neurologic defects reported.        /80   Ht 5' 6\" (1.676 m)   Wt (!) 295 lb (133.8 kg)   Breastfeeding No   BMI 47.61 kg/m    Body mass index is 47.61 kg/m .    EXAM:  General : Alert, cooperative, appears stated age   Skin: Skin color, texture, turgor normal, no rashes or lesions   Lymphatic: No obvious adenopathy, no swelling   Eyes: No scleral icterus, pupils equal  HENT: no traumatic injury to the head or face, no gross abnormalities  Lungs: Normal respiratory effort  Heart: Regular rate and rhythm  Abdomen: Soft, " nondistended.  Nontender in the epigastric region, but some tenderness with palpation of the right upper quadrant  Musculoskeletal: No obvious swelling  Neurologic: Grossly intact      LABS:  Lab Results   Component Value Date    WBC 7.4 05/20/2020    HGB 13.6 05/20/2020    HCT 42.1 05/20/2020    MCV 97 05/20/2020     05/20/2020     INR/Prothrombin Time      Lab Results   Component Value Date    ALT 25 02/03/2020    AST 20 02/03/2020    ALKPHOS 73 02/03/2020    BILITOT 0.5 02/03/2020       IMAGES:   Relevant images were reviewed and discussed with the patient.  Notable findings were from ultrasound imaging obtained March 18, 2020.  The presence of sludge within an otherwise normal-appearing gallbladder.  Her liver was noted to have increased echogenicity consistent with nonalcoholic fatty liver disease and her liver did appear enlarged on ultrasound imaging.    Assessment/Plan:   1. Calculus of gallbladder without cholecystitis without obstruction    2. Morbid obesity (H)        Ernestina Merlos is a 30 y.o. female with signs and symptoms consistent with biliary colic.  I have explained the pathophysiology of bile formation, storage and excretion in detail as well as the surgical versus non-operative management strategies.      The risks of surgery were discussed in detail which include, but are not limited to, bleeding, infection, injury to surrounding structures, the need to convert to an open procedure, blood clots, stroke, heart attack and death.  Specifically we discussed the risk of damage to the common bile duct and hepatic vessels, and the complications that may arise from damage to these structures.  Additionally, the risks of non operative management were discussed which include, but are not limited to, worsening infection, increased pain, sepsis and death.     She understands everything which was discussed and has consented to proceed with a laparoscopic cholecystectomy.  We will schedule surgery  accordingly.     We also discussed her obesity, and its role in contributing to both her nonalcoholic fatty liver disease as well as her gastroesophageal reflux disease.  We discussed the causal relationship between obesity and these 2 other medical issues, and whether she had had any interest in seeking medical treatment for her obesity.  She was interested in this, and a referral was placed as well to our comprehensive weight management program    Moise Kim MD, FACS  Office: [phone]  [facility]   General and Bariatric Surgery

## 2021-06-17 NOTE — PATIENT INSTRUCTIONS - HE
Thank you for your interest in Support Group!  We currently have two options for support group but they are in the virtual format only at this time.  Both groups are using Microsoft Teams for their platform and you can access it through the web or an zeferino that can be downloaded to a smart phone if you have one.      The Pre- and Post-op Connections Group is on the second Tuesday of the month from 6:30-8pm and is hosted by Levy Jimenez, PhD.  If you are interested in this group, you will need to email him at [email or web address] each month and then he will in turn send you the invitation to join.      We also have a Post-op focused Connections Group the 4th Wednesday of the month from 11am-12pm that is mostly geared toward post-operative patients who are past three months post-op.  This group is hosted by MARTIN Castrejon, CBN, CIC and you can email her to join the group at [email or web address] each month and she will send you an invite similar to Levy's group.  If you decide you would like to be a regular attender at this group, we can add you to an automatic invitation list of people.    You can see more information about available support groups that the [employer] offers to our patients as well by following the link:    [email or web address]      Please let us know if you have any questions.     Thank you,   [employer] [facility]          Seminars:   [email or web address]          Before being submitted for insurance approval, you will need the following:    -Clearance by the Psychologist  -Clearance by the dietitian  -Attend Support Group ( room at [facility]) 2nd Tuesday of the month 6:30-8pm. Make sure to sign in.  -Routine Health Care Maintenance must be up to date (mammograms yearly after age 40, paps as recommended by your primary provider,  colonoscopy  after age 50, earlier if high risk.  -If you are on estrogen-estrogen will be discontinued one month prior to surgery. It may be resumed one month after surgery unless otherwise advised.  -Pre Operative Lab work-ordered today  -Structured weight loss visits IF mandated by your insurance carrier  -Surgeon consult  -You will need to be using CPAP for at least one month before surgery if you have sleep apnea. Make sure to bring your CPAP or BiPAP to the hospital at the time of surgery.  -You will need to be tobacco free for 2 months before surgery and remain a non-smoker thereafter. If you are currently smoking or have recently quit, your urine will be evaluated for tobacco metabolites pre-operatively.  -If you are on insulin, you might be referred to an endocrinologist who will manage your insulin during the liquid diet and around the time of surgery. This endocrinologist does not replace your primary provider or your endocrinologist.   -You will need an exercise plan which includes MOVE, ie., walking and MUSCLE, ie.,calisthenics, bands, weight, machines, etc...  ______________________________________________________________________    Remember that after your bariatric surgery, vitamin supplementation is a lifelong need.    You will take:    B-12 1000mcg or higher sublingual (under the tongue) daily or by injection 1-2X/month  D3 5000U daily   Multivitamin containing 18mg of iron twice a day  Calcium citrate 1 or 2 daily    To keep your weight off and your vitamin levels up, follow-up is important.    Your labs will be monitored every 6 months for the first two years (every 3 months if you had a duodenal switch) and yearly thereafter.    To avoid ulcers in your stomach avoid tobacco forever, alcohol in excess, caffeine in excess and anti-inflammatories (NSAIDS)  (Aspirin, Ibuprofen, Naproxen and similar medications). Tylenol is fine.  If you are told by your physician take Aspirin to protect your heart or for another  "reason, make sure to take omeprazole or similar medication (protonix, nexium, prevacid) to protect your stomach.    Remember that alcohol affects you differently after bariatric surgery. If you have even ONE drink DO NOT DRIVE.       Trouble with kay access call:  [phone]    Bariatric Task List  Status:  Is patient a candidate for bariatric surgery?:  patient is a candidate for bariatric surgery -     Cleared to schedule surgeon consult?:  not cleared to schedule surgeon consult -     Status:  surgery evaluation in process -     Surgeon: Judy -        Insurance: Insurance:  [facility] -        Patient Info: Initial Weight:  290 -     Date of Initial Weight/Height:  5/11/2021 -     Goal Weight (lbs):    - to be determined   Required Weight Loss:  0 -     Surgery Type:  undecided - considering sleeve      Dietician Visits: Structured weight loss required by insurance?:    -     Dietician Visit 1:  Needed -     Dietician Visit 2:    -     Dietician Visit 3:    -     Dietician Visit 4:    -     Dietician Visit 5:    -     Dietician Visit 6:    -     Dietician Visit additional:    -     Clearance from dietician to see surgeon?:    -  Needed   Dietician Notes:    -        Psychological Evaluation: Psych eval:  Needed -     Other:    -        Lab Work: Hgb A1c:  Needed - with initial labs   Other:  Needed - initial labs ordered      Consults/ Clearance: Sleep Medicine:  Needed - needs to start using CPAP regularly      PCP: Establish care with PCP:  Completed -        Patient Education:  Information Session:  Needed -  [email or web address]   Given \"Making your decision\" handout?:  Given \"\"Making your decision\"\" handout? -     Given support group information?:  support group contact information provided -     Attended support group?:  Needed -     Support plan in place?:  Completed -        Additional Surgery Requirements: Review Coag plan:  Completed - standard   Birth control plan:  Completed - " nexplanon   Other Requirements:  pap smear - 2/12/2020 - Data deleted   Other Requirements:  no actigall, no MRSA -        Final Tasks:  Before surgery online class:  Needed - 3 weeks prior with RD and RN   After surgery online class:  Needed - 1 week after surgery with RD   Pre-assessment clinic visit with primary care team for H&P:  Needed - within 1 month of surgry   Final testing:  Needed - CXR and EKG within 6 months, Labs within 1 month.  RN will order at pre-op class.      Notes:   -

## 2021-06-18 ENCOUNTER — AMBULATORY - HEALTHEAST (OUTPATIENT)
Dept: SURGERY | Facility: CLINIC | Age: 31
End: 2021-06-18

## 2021-06-18 DIAGNOSIS — K80.20 CALCULUS OF GALLBLADDER WITHOUT CHOLECYSTITIS WITHOUT OBSTRUCTION: ICD-10-CM

## 2021-06-18 RX ORDER — CHOLESTYRAMINE 4 G/9G
4 POWDER, FOR SUSPENSION ORAL 3 TIMES DAILY
Qty: 90 PACKET | Refills: 0 | Status: SHIPPED | OUTPATIENT
Start: 2021-06-18 | End: 2023-04-04

## 2021-06-21 NOTE — LETTER
Letter by Rosa Jackson CMA at      Author: Rosa Jackson CMA Service: -- Author Type: --    Filed:  Encounter Date: 4/15/2021 Status: (Other)       Ernestina - Surgery Details:    We've received instruction to get you scheduled for laparoscopic cholecystectomy with Dr Moise Kim. We have that arranged as follows:     Surgery Date: Wednesday April 28th   Location: [address], [address] (3rd floor) [facility]  Arrival Time: 12:00 PM (Unless instructed differently by the pre-op call nurse)     Prep Instructions:     1. Please schedule a pre-op physical with your primary care doctor. This may be virtual or face-to-face depending on your doctors preference.     2. PCR-Rated COVID19 testing is required within 4 days of surgery. We have this scheduled for you at [facility], [address] [place] [address], 1st Floor on Monday April 26th at 11:00 AM. Follow the specific instructions you receive by Roya. If your test is positive, your surgery will be canceled.     3. Nothing to eat or drink for 8 hours before surgery unless instructed differently by the pre-op nurse.    4. No blood thinners including aspirin for one week prior to surgery. Verify this is safe for you with your primary care doctor before stopping.     5. You will need an adult to drive you home and stay with you 24 hours after surgery.     6. At this time, no visitors are allowed at the facility; your  will not be able to wait for you.     7. When you arrive to the surgery center, you will be screened for COVID19 symptoms. If you screen positive, your surgery will need to be postponed for your safety.    8. If the community sees a new surge in COVID19 hospital admissions, your procedure may need to be postponed. We will contact you if this happens.    9. We always encourage you to notify your insurance any time you have something scheduled including surgery. The number is usually right on the back of your insurance card.  Please call [facility] Cost of Care at [phone] for the surgeon fees, and [facility] Cost of Care [phone] for facility fees if you need pricing information.     Call our office if you have any questions! Thank you!       Rosa DOUGLAS  Surgery Scheduler  [facility]  Surgery [facility]  Direct line: [phone]   Main Line: [phone]  Direct Fax: [phone]

## 2021-06-25 NOTE — PROGRESS NOTES
Tele-Visit Details    Type of service:  Video Visit    Video Start Time (time video started): 12:45 PM    Video End Time (time video stopped): 1:28 PM    Originating Location (pt. Location): Patient Home    Distant Location (provider location): Home office    Reason for Televisit: Bariatric Psychology-2nd Session    Mode of Communication:  Video Conference via [email or web address]    Physician has received verbal consent for a video visit from the patient? Yes    Ernestina has made quality changes in eating and lifestyle, but still needs to be much more mindful about her eating, especially preparing foods. She will follow up with a list of activities and mindful eating strategies. Dx: F32.9; F41.9; E66.01    Levy Jimenez

## 2021-06-26 ENCOUNTER — HEALTH MAINTENANCE LETTER (OUTPATIENT)
Age: 31
End: 2021-06-26

## 2021-07-03 NOTE — ADDENDUM NOTE
Addendum Note by Rena Helm, RN at 8/13/2020 11:36 AM     Author: Rena Helm RN Service: -- Author Type: Registered Nurse    Filed: 8/26/2020 10:33 AM Encounter Date: 8/13/2020 Status: Signed    : Rena Helm RN (Registered Nurse)    Addended by: RENA HELM on: 8/26/2020 10:33 AM        Modules accepted: Orders

## 2021-07-10 ENCOUNTER — COMMUNICATION - HEALTHEAST (OUTPATIENT)
Dept: SURGERY | Facility: CLINIC | Age: 31
End: 2021-07-10

## 2021-07-29 ENCOUNTER — LAB REQUISITION (OUTPATIENT)
Dept: LAB | Facility: CLINIC | Age: 31
End: 2021-07-29
Payer: COMMERCIAL

## 2021-07-29 DIAGNOSIS — Z11.8 ENCOUNTER FOR SCREENING FOR OTHER INFECTIOUS AND PARASITIC DISEASES: ICD-10-CM

## 2021-07-29 LAB — HIV 1+2 AB+HIV1 P24 AG SERPL QL IA: NEGATIVE

## 2021-07-29 PROCEDURE — 87389 HIV-1 AG W/HIV-1&-2 AB AG IA: CPT | Performed by: PHYSICIAN ASSISTANT

## 2021-07-29 PROCEDURE — 87491 CHLMYD TRACH DNA AMP PROBE: CPT | Mod: ORL | Performed by: PHYSICIAN ASSISTANT

## 2021-07-29 PROCEDURE — 86780 TREPONEMA PALLIDUM: CPT | Mod: ORL | Performed by: PHYSICIAN ASSISTANT

## 2021-07-29 PROCEDURE — 86803 HEPATITIS C AB TEST: CPT | Performed by: PHYSICIAN ASSISTANT

## 2021-07-29 PROCEDURE — 87591 N.GONORRHOEAE DNA AMP PROB: CPT | Mod: ORL | Performed by: PHYSICIAN ASSISTANT

## 2021-07-30 LAB
HCV AB SERPL QL IA: NEGATIVE
T PALLIDUM AB SER QL: NEGATIVE

## 2021-07-31 LAB
C TRACH DNA SPEC QL NAA+PROBE: NEGATIVE
N GONORRHOEA DNA SPEC QL NAA+PROBE: NEGATIVE

## 2021-09-20 ENCOUNTER — LAB REQUISITION (OUTPATIENT)
Dept: LAB | Facility: CLINIC | Age: 31
End: 2021-09-20
Payer: COMMERCIAL

## 2021-09-20 DIAGNOSIS — Z01.812 ENCOUNTER FOR PREPROCEDURAL LABORATORY EXAMINATION: ICD-10-CM

## 2021-09-20 DIAGNOSIS — Z01.818 ENCOUNTER FOR OTHER PREPROCEDURAL EXAMINATION: ICD-10-CM

## 2021-09-20 LAB
ERYTHROCYTE [DISTWIDTH] IN BLOOD BY AUTOMATED COUNT: 12.3 % (ref 10–15)
HCT VFR BLD AUTO: 39.9 % (ref 35–47)
HGB BLD-MCNC: 12.6 G/DL (ref 11.7–15.7)
MCH RBC QN AUTO: 31.7 PG (ref 26.5–33)
MCHC RBC AUTO-ENTMCNC: 31.6 G/DL (ref 31.5–36.5)
MCV RBC AUTO: 101 FL (ref 78–100)
PLATELET # BLD AUTO: 245 10E3/UL (ref 150–450)
RBC # BLD AUTO: 3.97 10E6/UL (ref 3.8–5.2)
WBC # BLD AUTO: 6.3 10E3/UL (ref 4–11)

## 2021-09-20 PROCEDURE — U0005 INFEC AGEN DETEC AMPLI PROBE: HCPCS | Mod: ORL | Performed by: FAMILY MEDICINE

## 2021-09-20 PROCEDURE — 85027 COMPLETE CBC AUTOMATED: CPT | Mod: ORL | Performed by: FAMILY MEDICINE

## 2021-09-21 LAB — SARS-COV-2 RNA RESP QL NAA+PROBE: NEGATIVE

## 2021-10-16 ENCOUNTER — HEALTH MAINTENANCE LETTER (OUTPATIENT)
Age: 31
End: 2021-10-16

## 2022-01-12 VITALS — BODY MASS INDEX: 47.09 KG/M2 | HEIGHT: 66 IN | WEIGHT: 293 LBS

## 2022-01-18 VITALS
SYSTOLIC BLOOD PRESSURE: 122 MMHG | DIASTOLIC BLOOD PRESSURE: 80 MMHG | HEIGHT: 66 IN | WEIGHT: 293 LBS | BODY MASS INDEX: 47.09 KG/M2

## 2022-01-18 VITALS — HEIGHT: 66 IN | WEIGHT: 290 LBS | BODY MASS INDEX: 46.61 KG/M2

## 2022-07-17 ENCOUNTER — HEALTH MAINTENANCE LETTER (OUTPATIENT)
Age: 32
End: 2022-07-17

## 2022-09-25 ENCOUNTER — HEALTH MAINTENANCE LETTER (OUTPATIENT)
Age: 32
End: 2022-09-25

## 2023-02-15 ENCOUNTER — LAB REQUISITION (OUTPATIENT)
Dept: LAB | Facility: CLINIC | Age: 33
End: 2023-02-15
Payer: COMMERCIAL

## 2023-02-15 DIAGNOSIS — Z11.8 ENCOUNTER FOR SCREENING FOR OTHER INFECTIOUS AND PARASITIC DISEASES: ICD-10-CM

## 2023-02-15 DIAGNOSIS — Z12.4 ENCOUNTER FOR SCREENING FOR MALIGNANT NEOPLASM OF CERVIX: ICD-10-CM

## 2023-02-15 DIAGNOSIS — Z11.3 ENCOUNTER FOR SCREENING FOR INFECTIONS WITH A PREDOMINANTLY SEXUAL MODE OF TRANSMISSION: ICD-10-CM

## 2023-02-15 PROCEDURE — 87624 HPV HI-RISK TYP POOLED RSLT: CPT | Mod: ORL | Performed by: PHYSICIAN ASSISTANT

## 2023-02-15 PROCEDURE — G0145 SCR C/V CYTO,THINLAYER,RESCR: HCPCS | Mod: ORL | Performed by: PHYSICIAN ASSISTANT

## 2023-02-15 PROCEDURE — 87491 CHLMYD TRACH DNA AMP PROBE: CPT | Mod: ORL | Performed by: PHYSICIAN ASSISTANT

## 2023-02-15 PROCEDURE — 87389 HIV-1 AG W/HIV-1&-2 AB AG IA: CPT | Mod: ORL | Performed by: PHYSICIAN ASSISTANT

## 2023-02-15 PROCEDURE — 87591 N.GONORRHOEAE DNA AMP PROB: CPT | Mod: ORL | Performed by: PHYSICIAN ASSISTANT

## 2023-02-15 PROCEDURE — 86780 TREPONEMA PALLIDUM: CPT | Mod: ORL | Performed by: PHYSICIAN ASSISTANT

## 2023-02-15 PROCEDURE — 86803 HEPATITIS C AB TEST: CPT | Mod: ORL | Performed by: PHYSICIAN ASSISTANT

## 2023-02-16 LAB
C TRACH DNA SPEC QL PROBE+SIG AMP: NEGATIVE
HCV AB SERPL QL IA: NONREACTIVE
HIV 1+2 AB+HIV1 P24 AG SERPL QL IA: NONREACTIVE
N GONORRHOEA DNA SPEC QL NAA+PROBE: NEGATIVE
T PALLIDUM AB SER QL: NONREACTIVE

## 2023-02-17 LAB
BKR LAB AP GYN ADEQUACY: NORMAL
BKR LAB AP GYN INTERPRETATION: NORMAL
BKR LAB AP HPV REFLEX: NORMAL
BKR LAB AP LMP: NORMAL
BKR LAB AP PREVIOUS ABNORMAL: NORMAL
PATH REPORT.COMMENTS IMP SPEC: NORMAL
PATH REPORT.COMMENTS IMP SPEC: NORMAL
PATH REPORT.RELEVANT HX SPEC: NORMAL

## 2023-02-21 LAB
HUMAN PAPILLOMA VIRUS 16 DNA: NEGATIVE
HUMAN PAPILLOMA VIRUS 18 DNA: NEGATIVE
HUMAN PAPILLOMA VIRUS FINAL DIAGNOSIS: NORMAL
HUMAN PAPILLOMA VIRUS OTHER HR: NEGATIVE

## 2023-03-20 ENCOUNTER — VIRTUAL VISIT (OUTPATIENT)
Dept: SURGERY | Facility: CLINIC | Age: 33
End: 2023-03-20
Payer: COMMERCIAL

## 2023-03-20 DIAGNOSIS — Z71.3 NUTRITIONAL COUNSELING: ICD-10-CM

## 2023-03-20 DIAGNOSIS — E66.01 CLASS 3 SEVERE OBESITY DUE TO EXCESS CALORIES WITH SERIOUS COMORBIDITY AND BODY MASS INDEX (BMI) OF 50.0 TO 59.9 IN ADULT (H): ICD-10-CM

## 2023-03-20 DIAGNOSIS — K21.9 GASTROESOPHAGEAL REFLUX DISEASE WITHOUT ESOPHAGITIS: Primary | ICD-10-CM

## 2023-03-20 DIAGNOSIS — E66.813 CLASS 3 SEVERE OBESITY DUE TO EXCESS CALORIES WITH SERIOUS COMORBIDITY AND BODY MASS INDEX (BMI) OF 50.0 TO 59.9 IN ADULT (H): ICD-10-CM

## 2023-03-20 PROCEDURE — 97803 MED NUTRITION INDIV SUBSEQ: CPT | Mod: VID | Performed by: DIETITIAN, REGISTERED

## 2023-03-20 NOTE — LETTER
3/20/2023         RE: Ernestina Merlos  [address]  [facility] [address]        Dear Colleague,    Thank you for referring your patient, Ernestina Merlos, to the [facility] [place]. Please see a copy of my visit note below.    Ernestina Merlos is a 32 year old who is being evaluated via a billable video visit.      How would you like to obtain your AVS? MyChart  If the video visit is dropped, the invitation should be resent by: Send to e-mail at: [email or web address]  Will anyone else be joining your video visit? No          Follow Up Surgical Weight Loss Supervised Diet Evaluation    Assessment:  Pt. is being seen today for a follow up RD nutritional evaluation. Pt. has been unsuccessful with non-surgical weight loss methods and is interested in bariatric surgery. Today we reviewed current eating habits and level of physical activity, and instructed on the changes that are required for successful bariatric outcomes.    Surgery of interest per pt: LSG.    Workflow review:  Support Group: Not completed.  Psychology:In progress.  Lab work:Completed. (will need to be redrawn since last draw was in 2021)  SWL:No     Weight goal: to be determined at face to face appointment in July.    Anthropometrics:  Pt's weight is 320 lbs    BMI: There is no height or weight on file to calculate BMI.   Patient weight not recorded    Estimated RMR (Buchanan-St Jeor equation):  2186 kcals x 1.3 (light active) = 2842 kcals (for weight maintenance)    Medical History:  Patient Active Problem List   Diagnosis     Morbid obesity (H)     Calculus of gallbladder without cholecystitis without obstruction     Gastroesophageal reflux disease without esophagitis     Psoriatic arthritis (H)     HASMUKH (obstructive sleep apnea)      Diabetes: No   HbA1c:  No results found for: HGBA1C   Vitamins: Vitamin D, Mg, MVI, Fish Oil    Progress over past month: re-establishing program, last seen in 2021.      Diet Recall/Time  Breakfast: Overnight Oats with Protein Powder  Lunch: skipped or mac and cheese   Dinner: typically eat out-trying to work on reducing consumption    Typical Snacks: struggles with boredom eating-fruit or crackers     Overnight eating: No    Eating out: 4-5 times/week     Meal duration: not as good lately     fluids by 30 minutes before, during meal, and waiting 30 minutes after meal before drinking fluids: No    Beverages  Water-has been working on increased consumption, Pop-working on reduced consumption    Exercise  Trying to incorporate-treadmill at home (cardio, lifting smaller weights) shooting for up to 6 days/week    PES statement:   Overweight/Obesity (NC 3.3) related to overeating and poor lifestyle habits as evidenced by patient's subjective statements (excessive energy intake) and BMI of 51.8 kg/m2.       Intervention    Nutrition Education:   1. Provided general overview of diet and lifestyle modifications needed to be a deemed a safe candidate for bariatric surgery.   2. Vitamins: Educated on post-op vitamin regimen including MVI+ 18 mg Fe two times a day, calcium citrate 400-600 mg two times a day, 7067-7491 mcg sublingual B12 daily, 5000 IU vitamin D3 daily.     Food/Nutrient Delivery:  3. Educated patient on eating three meals, with cutting out snacking.  4. Bariatric Plate: Patient and I discussed the importance of including a lean protein source (20-30 grams/meal), vegetables (included at lunch and dinner), one serving (15g) of carbohydrate, and limited added fat (1 tb/day) at each meal.    5. Educated patient on using a protein powder drink as a meal replacement and/or supplement after bariatric surgery.   6. Discussed importance of adequate hydration after surgery, with goal of at least 64 oz of fluids/day.  7. Addressed avoiding all carbonated, caffeinated and sweetened drinks to prepare for bariatric surgery.     Nutrition Counselin. Mindful eating  techniques: Encouraged slow meal pace, chewing foods to applesauce consistency for 20-30 minutes/meal.   9. Discussed  fluids 30 minutes before, during, and after meal to prevent dumping syndrome and discomfort post bariatric surgery.       Instructions/Goals:     1. Continue implementing bariatric surgery lifestyle modifications.    Handouts Provided:   [facility] Bariatric Surgery Nutrition Info  Protein supplement list    Monitor/Evaluation:  Pt. s target weight:  no gain from initial visit, pt. verbalized understanding.     Plan for next visit:   Educate on dumping syndrome and reading food labels.  (Final supervised diet visit with RD) pre/post-op  diet progression, give review of surgery process.      Video-Visit Details    Type of service:  Video Visit    Video Start Time (time video started): 12:17 pm    Video End Time (time video stopped): 12:33 pm    Originating Location (pt. Location): Home        Distant Location (provider location):  Off-site    Mode of Communication:  Video Conference via [facility]    Physician has received verbal consent for a Video Visit from the patient? Yes      Hayley Rubalcava RD              Again, thank you for allowing me to participate in the care of your patient.        Sincerely,        Hayley Rubalcava RD

## 2023-03-20 NOTE — PROGRESS NOTES
Ernestina Merlos is a 32 year old who is being evaluated via a billable video visit.      How would you like to obtain your AVS? MyChart  If the video visit is dropped, the invitation should be resent by: Send to e-mail at: [email or web address]  Will anyone else be joining your video visit? No          Follow Up Surgical Weight Loss Supervised Diet Evaluation    Assessment:  Pt. is being seen today for a follow up RD nutritional evaluation. Pt. has been unsuccessful with non-surgical weight loss methods and is interested in bariatric surgery. Today we reviewed current eating habits and level of physical activity, and instructed on the changes that are required for successful bariatric outcomes.    Surgery of interest per pt: LSG.    Workflow review:  Support Group: Not completed.  Psychology:In progress.  Lab work:Completed. (will need to be redrawn since last draw was in 2021)  SWL:No     Weight goal: to be determined at face to face appointment in July.    Anthropometrics:  Pt's weight is 320 lbs    BMI: There is no height or weight on file to calculate BMI.   Patient weight not recorded    Estimated RMR (Neptune-St Jeor equation):  2186 kcals x 1.3 (light active) = 2842 kcals (for weight maintenance)    Medical History:  Patient Active Problem List   Diagnosis     Morbid obesity (H)     Calculus of gallbladder without cholecystitis without obstruction     Gastroesophageal reflux disease without esophagitis     Psoriatic arthritis (H)     HASMUKH (obstructive sleep apnea)      Diabetes: No   HbA1c:  No results found for: HGBA1C   Vitamins: Vitamin D, Mg, MVI, Fish Oil    Progress over past month: re-establishing program, last seen in 2021.     Diet Recall/Time  Breakfast: Overnight Oats with Protein Powder  Lunch: skipped or mac and cheese   Dinner: typically eat out-trying to work on reducing consumption    Typical Snacks: struggles with boredom eating-fruit or crackers     Overnight eating: No    Eating out: 4-5  times/week     Meal duration: not as good lately     fluids by 30 minutes before, during meal, and waiting 30 minutes after meal before drinking fluids: No    Beverages  Water-has been working on increased consumption, Pop-working on reduced consumption    Exercise  Trying to incorporate-treadmill at home (cardio, lifting smaller weights) shooting for up to 6 days/week    PES statement:   Overweight/Obesity (NC 3.3) related to overeating and poor lifestyle habits as evidenced by patient's subjective statements (excessive energy intake) and BMI of 51.8 kg/m2.       Intervention    Nutrition Education:   1. Provided general overview of diet and lifestyle modifications needed to be a deemed a safe candidate for bariatric surgery.   2. Vitamins: Educated on post-op vitamin regimen including MVI+ 18 mg Fe two times a day, calcium citrate 400-600 mg two times a day, 2419-8657 mcg sublingual B12 daily, 5000 IU vitamin D3 daily.     Food/Nutrient Delivery:  3. Educated patient on eating three meals, with cutting out snacking.  4. Bariatric Plate: Patient and I discussed the importance of including a lean protein source (20-30 grams/meal), vegetables (included at lunch and dinner), one serving (15g) of carbohydrate, and limited added fat (1 tb/day) at each meal.    5. Educated patient on using a protein powder drink as a meal replacement and/or supplement after bariatric surgery.   6. Discussed importance of adequate hydration after surgery, with goal of at least 64 oz of fluids/day.  7. Addressed avoiding all carbonated, caffeinated and sweetened drinks to prepare for bariatric surgery.     Nutrition Counselin. Mindful eating techniques: Encouraged slow meal pace, chewing foods to applesauce consistency for 20-30 minutes/meal.   9. Discussed  fluids 30 minutes before, during, and after meal to prevent dumping syndrome and discomfort post bariatric surgery.       Instructions/Goals:     1. Continue  implementing bariatric surgery lifestyle modifications.    Handouts Provided:   [facility] Bariatric Surgery Nutrition Info  Protein supplement list    Monitor/Evaluation:  Pt. s target weight:  no gain from initial visit, pt. verbalized understanding.     Plan for next visit:   Educate on dumping syndrome and reading food labels.  (Final supervised diet visit with RD) pre/post-op  diet progression, give review of surgery process.      Video-Visit Details    Type of service:  Video Visit    Video Start Time (time video started): 12:17 pm    Video End Time (time video stopped): 12:33 pm    Originating Location (pt. Location): Home        Distant Location (provider location):  Off-site    Mode of Communication:  Video Conference via [facility]    Physician has received verbal consent for a Video Visit from the patient? Yes      Hayley Rubalcava, SIRI

## 2023-04-04 ENCOUNTER — OFFICE VISIT (OUTPATIENT)
Dept: SURGERY | Facility: CLINIC | Age: 33
End: 2023-04-04
Payer: COMMERCIAL

## 2023-04-04 ENCOUNTER — LAB (OUTPATIENT)
Dept: LAB | Facility: CLINIC | Age: 33
End: 2023-04-04
Payer: COMMERCIAL

## 2023-04-04 VITALS
HEIGHT: 66 IN | WEIGHT: 293 LBS | SYSTOLIC BLOOD PRESSURE: 136 MMHG | BODY MASS INDEX: 47.09 KG/M2 | DIASTOLIC BLOOD PRESSURE: 80 MMHG

## 2023-04-04 DIAGNOSIS — K76.0 FATTY INFILTRATION OF LIVER: ICD-10-CM

## 2023-04-04 DIAGNOSIS — E66.01 MORBID OBESITY (H): ICD-10-CM

## 2023-04-04 DIAGNOSIS — K21.9 GASTROESOPHAGEAL REFLUX DISEASE WITHOUT ESOPHAGITIS: ICD-10-CM

## 2023-04-04 DIAGNOSIS — E66.01 MORBID OBESITY (H): Primary | ICD-10-CM

## 2023-04-04 PROBLEM — G47.33 OSA (OBSTRUCTIVE SLEEP APNEA): Status: RESOLVED | Noted: 2021-05-11 | Resolved: 2023-04-04

## 2023-04-04 LAB
ALBUMIN SERPL BCG-MCNC: 4.3 G/DL (ref 3.5–5.2)
ALP SERPL-CCNC: 67 U/L (ref 35–104)
ALT SERPL W P-5'-P-CCNC: 27 U/L (ref 10–35)
ANION GAP SERPL CALCULATED.3IONS-SCNC: 12 MMOL/L (ref 7–15)
AST SERPL W P-5'-P-CCNC: 24 U/L (ref 10–35)
BILIRUB SERPL-MCNC: 0.2 MG/DL
BUN SERPL-MCNC: 10.4 MG/DL (ref 6–20)
CALCIUM SERPL-MCNC: 10.1 MG/DL (ref 8.6–10)
CHLORIDE SERPL-SCNC: 106 MMOL/L (ref 98–107)
CREAT SERPL-MCNC: 0.91 MG/DL (ref 0.51–0.95)
DEPRECATED CALCIDIOL+CALCIFEROL SERPL-MC: 70 UG/L (ref 20–75)
DEPRECATED HCO3 PLAS-SCNC: 24 MMOL/L (ref 22–29)
ERYTHROCYTE [DISTWIDTH] IN BLOOD BY AUTOMATED COUNT: 12.4 % (ref 10–15)
FERRITIN SERPL-MCNC: 43 NG/ML (ref 6–175)
FOLATE SERPL-MCNC: 15.1 NG/ML (ref 4.6–34.8)
GFR SERPL CREATININE-BSD FRML MDRD: 86 ML/MIN/1.73M2
GLUCOSE SERPL-MCNC: 96 MG/DL (ref 70–99)
HBA1C MFR BLD: 5.4 % (ref 0–5.6)
HCT VFR BLD AUTO: 41 % (ref 35–47)
HGB BLD-MCNC: 13.7 G/DL (ref 11.7–15.7)
MCH RBC QN AUTO: 32.2 PG (ref 26.5–33)
MCHC RBC AUTO-ENTMCNC: 33.4 G/DL (ref 31.5–36.5)
MCV RBC AUTO: 97 FL (ref 78–100)
PLATELET # BLD AUTO: 262 10E3/UL (ref 150–450)
POTASSIUM SERPL-SCNC: 4.4 MMOL/L (ref 3.4–5.3)
PROT SERPL-MCNC: 7.5 G/DL (ref 6.4–8.3)
PTH-INTACT SERPL-MCNC: 21 PG/ML (ref 15–65)
RBC # BLD AUTO: 4.25 10E6/UL (ref 3.8–5.2)
SODIUM SERPL-SCNC: 142 MMOL/L (ref 136–145)
TSH SERPL DL<=0.005 MIU/L-ACNC: 1.58 UIU/ML (ref 0.3–4.2)
VIT B12 SERPL-MCNC: 351 PG/ML (ref 232–1245)
WBC # BLD AUTO: 6.5 10E3/UL (ref 4–11)

## 2023-04-04 PROCEDURE — 82607 VITAMIN B-12: CPT

## 2023-04-04 PROCEDURE — 84443 ASSAY THYROID STIM HORMONE: CPT

## 2023-04-04 PROCEDURE — 84425 ASSAY OF VITAMIN B-1: CPT | Mod: 90

## 2023-04-04 PROCEDURE — 83970 ASSAY OF PARATHORMONE: CPT

## 2023-04-04 PROCEDURE — 80053 COMPREHEN METABOLIC PANEL: CPT

## 2023-04-04 PROCEDURE — 85027 COMPLETE CBC AUTOMATED: CPT

## 2023-04-04 PROCEDURE — 82728 ASSAY OF FERRITIN: CPT

## 2023-04-04 PROCEDURE — 99215 OFFICE O/P EST HI 40 MIN: CPT | Performed by: FAMILY MEDICINE

## 2023-04-04 PROCEDURE — 82306 VITAMIN D 25 HYDROXY: CPT

## 2023-04-04 PROCEDURE — 36415 COLL VENOUS BLD VENIPUNCTURE: CPT

## 2023-04-04 PROCEDURE — 83036 HEMOGLOBIN GLYCOSYLATED A1C: CPT

## 2023-04-04 PROCEDURE — 99000 SPECIMEN HANDLING OFFICE-LAB: CPT

## 2023-04-04 PROCEDURE — 84590 ASSAY OF VITAMIN A: CPT | Mod: 90

## 2023-04-04 PROCEDURE — 82746 ASSAY OF FOLIC ACID SERUM: CPT

## 2023-04-04 NOTE — PATIENT INSTRUCTIONS
Welcome to [facility]!      We are grateful that you have chosen us to partner with you on your journey to better health!  We have included some very important information for you to read as you begin your journey towards weight loss surgery. We will discuss parts of this as you move closer to surgery but please reach out if you have any questions or concerns.      Please click on the following links and they will lead you to a printable version of each handout or copy into your browser to view/print at home:    Making Your Decision, Understanding Weight Loss Surgery  [email or web address]    A Roadmap to Your Weight Loss Surgery  [email or web address]    Honoring Choices - Your Rights  [email or web address]    Honoring Choices - [place] Health Care Directive (form that can be filled out)  [email or web address]      Insurance Coverage and Approval for Surgery  Every insurance plan is different.  Many companies approve benefits for surgery, but many have specific requirements that must be completed prior to being approved.    Verification of benefits is the patient's responsibility.  You will be responsible for any charges not covered by your insurance plan - including, but not limited to copays, deductible, out of pocket, any amount over your cap limit, etc.  Using the guideline below, please contact your insurance plan (we recommend you call the Customer Service number on the back of your card).      Once all of the requirements for surgery are complete, you will see the surgeon.  Following this visit, we will submit your information to your insurance plan for Prior Authorization.  We strongly encourage you to submit any documentation that supports your weight loss attempts to us.    Questions that are important to ask your insurance company when you call them:    Are [facility] in my network?  Is bariatric surgery  a covered benefit under my policy?  If so, what is my estimated out of pocket expense?  Are there any exclusions or cap limits on my plan for bariatric surgery?  If so, what are they?  What are the criteria necessary to be approved for bariatric surgery?  Do you require medically supervised weight loss visits for approval of surgery?  If yes, for how many months?  Within the last # of years?  Are the following procedures a covered benefit under my plan?  Laparoscopic Gastric Bypass (CPT Code 16564)  Laparoscopic Sleeve Gastrectomy (CPT Code 68056)  Nutrition/Dietitian Consult (CPT Code 62908  Nutrition/Dietitian Reassessment (CPT Code 98761  Psychological Assessment (CPT Codes 53852 & 88438      Initial labs for Bariatric Surgery    Some lab orders were placed by your doctor in the [facility] system and can be drawn at any of our outpatient hospital labs or your clinic. If you do them at one of three hospitals ([facility] in [place], [facility] in [place], [facility] in [place]) you do not need an appointment but if you'd like to do them at a clinic, you will need to schedule an appointment with that clinic.       You will need to be fasting 10-12 hours prior (you can continue to drink water) and should have them drawn sooner verses later so if any corrections need to be made we will have time to address them.      [facility]'s lab is open 7 am - 4:30 pm Monday through Friday and 9am-12:30 pm on Saturdays.  [facility]'s lab is open 7 am -4:30 pm Monday through Friday and 8am to 11:30am on Saturday and Sundays.     If you would like them done at a clinic outside the [facility] system, then we will need to know where to fax the orders.   If you have any questions or would like help scheduling a lab appointment at the [facility], please give us a call on the Bariatric Nurse Line at [phone].        Peak to Success for Bariatric Surgery  Eat 3 nutrient-rich meals each day      Don't skip meals--it will cause you to overeat later in the day! Space meals 4-6 hours apart    Eat around the same times each day to develop a routine eating schedule    Avoid snacking unless physically hungry.   Planned snacks: 1-2 times per day and no more than 150 calories    Eating protein and fiber (vegetables/fruits/whole grains) with meals helps you stay full     Choose foods with less than 10 grams of sugar and 5-10 grams of fat per serving to prevent excess calories and weight gain  Eat protein first    Protein helps with healing, maintaining muscle mass and good nutrition, and reducing hunger     For RNY Gastric Bypass, Sleeve Gastrectomy, and Duodenal Switch: aim for 60-80 grams of protein per day    Eat protein first, then veggies and the fruits or grains  Stop drinking 15-30 minutes before meals and wait 30-45 minutes after eating to drink    Drinking too soon before a meal may cause you to be too full to eat    Drinking too soon after you eat will cause the food to  wash  through your new stomach too quickly--leaving you hungry and likely to eat more    Eat S-[id number]-O-[id number]-Y    Take 20-30 minutes to eat each meal by taking small bites, chewing foods to applesauce consistency or 20-30 times before you swallow    Not chewing food properly can block the opening of your pouch--causing pain, nausea, vomiting    Eating foods too fast can delay those full signals and increase overeating   Slow down your eating by smaller plates/bowls, toddler utensils, putting your fork/spoon down between bites and not watching TV/computer during meals!  Make a list of activities to prevent boredom, stress and emotional eating    Go for a walk or lift weights while watching your favorite TV show    Talk to a co-worker or email/call a friend     Keep your hands and mind busy with woodworking, puzzles, knitting or painting your nails    Practice deep breathing or meditation  Drink 64 ounces of 0-Calorie drinks between meals      Water    Zero calorie Propel , Vitamin Water Zero  or SoBe Lifewater , Crystal Light , Sugar-Free Vinod-Aid , and other sugar-free lemonade or flavored day    Decaffeinated, unsweetened coffee/ tea (1 cup or less per day)   Avoid Caffeine and Carbonation- NO STRAWS    Caffeine can irritate your new pouch, increase risk for ulcers, and can increase your appetite      Carbonation can lead to increased bloating/gas and discomfort   Work up to a total of 30-60 minutes of physical activity most days of the week    Helps with losing weight and preventing weight regain after surgery     Do a combination of cardio (walking/water exercises/biking/swimming) and strength training  Take daily vitamin/mineral supplements after surgery    Daily use of vitamins/minerals is necessary for the rest of your life      Psychological Evaluation for Bariatric Surgery      Why do I need a psychological evaluation before bariatric surgery?     The psychologist's main purpose is to provide the support and education to ensure you have the most successful outcome after surgery.   Preparing for bariatric surgery often involves changing behavior patterns. Working on these changes before surgery allows you to achieve the best results after surgery as some of these behaviors have been entrenched for many years. In addition, your relationship with food may need to change. Psychologists are experts in behavior change and are there to guide and support you as you make these important changes.   A significant life change, like losing a lot of weight, may affect many areas of your life--self-concept, physical activity and relationships with others. Your psychologist will help you prepare to adjust to these changes in a healthy way.   If you have mental health symptoms, relationship struggles, or other challenges, your psychologist will suggest how to best address these concerns so that they do not interfere with your progress toward a healthier  lifestyle.       Is the psychologist looking for reasons to say I can't have surgery?   The goal is to not find specific psychological problems. Instead, the psychologist will be working with your strengths to ensure you have the most optimal outcome after surgery.  There is no expectation that you will have everything figured out already or that you must have  perfect  behaviors before moving forward with surgery. Your psychologist is expecting that you will have some behavior changes that will need to occur concurrent with the surgery.   You can feel comfortable that the psychologist is not there to    you or your habits. The psychologist is there to provide support and encourage your progress.      What should I expect during the evaluation?   During the interview, which could take place over 2 or more sessions, the psychologist will ask about:   -weight history, current eating pattern and fluid intake, and previous attempts at weight loss   -activity level   -psychiatric history  -drug, alcohol and nicotine use   -social and developmental history  -support system   -current stressors and coping mechanisms   -understanding of the risks of surgery   -expectations for outcomes after surgery   You will complete various questionnaires that will focus on coping strategies, eating behaviors, quality of life and adverse childhood experiences in order to provide additional information to inform the assessment.   Your psychologist will likely give you some  homework assignments  to practice as you prepare for surgery. This will be individually tailored to your specific needs.   Your psychologist will talk with you about some of the typical challenges that patients might encounter after surgery and how to prepare for these.   A final report will be generated and any treatment recommendations will be discussed with you and the bariatric team to determine next steps.   If you would like, you may have any support people  (e.g., spouse) join a session of the evaluation to provide additional information.       Bariatric surgery offers a physical  tool  for weight management. Similarly, your work with the psychologist will provide you with some additional emotional and behavioral  tools  as you work toward your healthier lifestyle goals.      Support Group    Support and accountability is an important part of your weight loss journey and maintenance once you reach your health goals.  Because of this, we require that you attend at least one of our support groups before you meet with the surgeon so you get a feel for what they are like and hopefully establish a connection to others who are going through a similar process or have had surgery before so you can ask your questions.    We currently have two options for support group but they are in the virtual format only at this time.  Both groups are using Microsoft Teams for their platform and you can access it through the web or an zeferino that can be downloaded to a smart phone if you have one.      The Pre- and Post-op Connections Group is on the second Tuesday of the month from 6:30-8pm and is hosted by Levy Jimenez, PhD.  If you are interested in this group, you will need to email him at [email or web address] each month and then he will in turn send you the invitation to join.      We also have a Post-op focused Connections Group the 4th Wednesday of the month from 11am-12pm that is mostly geared toward post-operative patients who are past three months post-op.  This group is hosted by MARTIN Castrejon, CBN, CIC and you can email her to join the group at [email or web address] each month and she will send you an invite similar to Levy's group.  If you decide you would like to be a regular attender at this group, we can add you to an automatic invitation list of people.    You can see more information about available support groups that the [facility] offers to our  patients as well by following the link:    The following Support Group information can also be found on our website:  [email or web address]      Please let us know if you have any questions about all the information above and we will be talking more about this during future visits.     Thank you,   [facility] Bariatric Team    Maurice Merlos,    Thank you for your interest in [facility]'s Bariatric Surgery Program.    Before your first consultation, you will need to watch six short videos that take about 30 minutes to complete.    You can find them at the bottom of this page: [email or web address].    Be sure to watch all six !          Thank you, and have a great day,    Comprehensive Weight Management Program Scheduling Team    Contact us Monday thru Friday, from 7:00 am until 5:00 pm by calling [phone].        Bariatric Task List    Fax:  Please fax all paperwork to: [phone] -     Status:  Is patient a candidate for bariatric surgery?:  patient is a candidate for bariatric surgery -     Cleared to schedule surgeon consult?:  not cleared to schedule surgeon consult -     Status:  surgery evaluation in process -     Surgeon: Kofi or Judy -        Insurance: Insurance:  [facility] -      Contact insurance to discuss coverage: Needed -       Other:  Call Talia at [phone] to discuss insurance requirements. -        Patient Info: Initial Weight:  328 -     Date of Initial Weight/Height:  4/4/2023 -     Goal Weight (lbs):  328 -     Required Weight Loss:  No gain from initial weight, any loss is great! -     Surgery Type:  undecided -        Dietician Visits: Structured weight loss required by insurance?:    - Per Talia   Clearance from dietician to see surgeon?:  Needed - Hayley      Psychological Evaluation: Psych eval:  Needed Adam Lockett      Lab Work: Complete Blood Count:  Completed -     Comprehensive Metabolic Panel:   "Completed -     Vitamin D:  Completed -     PTH:  Completed -     Hgb A1c:  Completed - 5.4 on 4/4/2023    TSH (CASI, SCA, [place] MA): Completed -       Ferritin: Completed -       Folate: Completed -     Thiamine: Completed -     Vitamin A: Completed -     Vitamin B12: Completed -     Zinc: Completed -     Other:  Completed - routine      Consults/ Clearance Sleep Medicine:  Completed - Pt has HASMUKH and uses CPAP      Testing: Sleep Study: Completed -        Stopping Smoking/ Alcohol Use: Quit tobacco use (3 months smoke free)?:    - never smoker      Patient Education:  Information Session:  Needed - Must watch videos!   Given \"Making your decision\" handout?:  Yes -     Given \"A Roadmap to you Weight Loss Surgery\" handout?: Yes -     Given support group information?:  Send email to [email or web address] to request invite to next meeting.   Attended support group?:  Needed - Must attend at least once!   Support plan in place?:  Completed - I have family who have had the surgery and a supportive friend group      Additional Surgery Requirements: Review Coag plan:  Completed - standard   Birth control plan:  Completed - nexplanon currently, considering IUD   Gallstone prevention plan (Actigall for 6 months postop): Completed - s/p pilar      Final Tasks:  Before surgery online class:  Needed - Prior to surgery date.   After surgery online class:  Needed - 1 week after surgery w/dietitian   History and Physical per clinic: Needed - within 30 days of surgery  -     Final labs per clinic: Needed - within 30 days of surgery   Chest xray per clinic:  Needed - within 90 days of surgery -     Electrocardiogram (ECG) per clinic:  Needed - within 90 days of surgery -               "

## 2023-04-04 NOTE — LETTER
"    2023         RE: Ernestina Merlos  [address]  [facility] [address]        Dear Colleague,    Thank you for referring your patient, Ernestina Merlos, to the [facility] [place]. Please see a copy of my visit note below.    New Bariatric Surgery Consultation Note    2023    RE: Ernestina Merlos  MR#: [id number]  : 1990      Referring provider:        View : No data to display.                Chief Complaint/Reason for visit: evaluation for possible weight loss surgery    Dear Deann Luciano PA (General),    I had the pleasure of seeing your patient, Ernestina Merlos, to evaluate her obesity and consider her for possible weight loss surgery. As you know, Ernestina Merlos is 32 year old.  She has a height of 5' 6\", a weight of 328 lbs 0 oz, and calculated Body mass index is 52.94 kg/m . Patient started the surgical preparation in May 2021. She was lost to follow up for over one year but met with the dietician last month.        HISTORY OF PRESENT ILLNESS:      4/3/2023     7:29 PM   Weight Loss History Reviewed with Patient   How long have you been overweight? Since puberty   What is the most that you have ever weighed 330   What is the most weight you have lost? 60   I have tried the following methods to lose weight Watching portions or calories    Exercise    Weight Watchers       CO-MORBIDITIES OF OBESITY INCLUDE:      4/3/2023     7:29 PM   --   I have the following health issues associated with obesity Sleep Apnea - she thinks it resolved with tonsilectomy   GERD (Reflux) - requiring daily medication   Fatty Liver       PAST MEDICAL HISTORY:  Past Medical History:   Diagnosis Date     ADHD      Arthritis      Chicken pox      Depression      Dysmenorrhea      GERD (gastroesophageal reflux disease)      Hiatal hernia      Microscopic colitis      Panic attack      Raynaud disease      Shortness of breath      Sleep apnea     uses CPAP     " Vitamin D deficiency        PAST SURGICAL HISTORY:  Past Surgical History:   Procedure Laterality Date     US ASPIRATION HEMATOMA SEROMA OR FLUID COLLECTION  3/12/2020     ZZC LAP,CHOLECYSTECTOMY/EXPLORE N/A 4/28/2021    Procedure: CHOLECYSTECTOMY, LAPAROSCOPIC;  Surgeon: Moise Kim MD;  Location: [facility];  Service: General     No personal history of anesthesia problems0.    FAMILY HISTORY:   Family History   Problem Relation Age of Onset     Skin Cancer Mother      Crohn's Disease Mother      Colitis Mother      Hypertension Father        SOCIAL HISTORY:       4/3/2023     7:29 PM   Social History Questions Reviewed With Patient   Which best describes your employment status (select all that apply) I work full-time   If you work, what is your occupation?    Which best describes your marital status single   Who do you have in your support network that can be available to help you for the first 2 weeks after surgery? All my family lives within 30 minutes of me   Who can you count on for support throughout your weight loss surgery journey? I have family who have had the surgery and a supportive friend group       HABITS:      4/3/2023     7:29 PM   --   How often do you drink alcohol? 2-4 times a month   If you do drink alcohol, how many drinks might you have in a day? (one drink = 5 oz. wine, 1 can/bottle of beer, 1 shot liquor) 3 or 4   Do you currently use any of the following Nicotine products? No   Have you ever used any of the following nicotine products? No       PSYCHOLOGICAL HISTORY:       4/3/2023     7:29 PM   Psychological History Reviewed With Patient   Have you ever attempted suicide? Never.   Have you ever been hospitalized for mental illness or a suicide attempt? Never.   Are you currently being treated for any of the following? (select all that apply) Depression    Anxiety    ADHD       ROS:      4/3/2023     7:29 PM   --   Skin None of the above   HEENT Headaches     "Dizziness/lightheadedness   Musculoskeletal Back pain    Arthritis   Cardiovascular Shortness of breath with activity   Pulmonary Shortness of breath with activity    Experience morning headaches   Gastrointestinal Heartburn    Reflux    Diarrhea   Genitourinary None of the above   Hematological None of the above   Neurological Migraine headaches   Female only Birth control       EATING BEHAVIORS:       View : No data to display.                EXERCISE:      4/3/2023     7:29 PM   --   How often do you exercise? 1 to 2 times per week   What is the duration of your exercise (in minutes)? 30 Minutes   What types of exercise do you do? walking    home gym   What keeps you from being more active? I should be more active but I just have not gotten around to it    Too tired    Worried people will look at me       MEDICATIONS:  Current Outpatient Medications   Medication Sig Dispense Refill     buPROPion (WELLBUTRIN XL) 300 MG 24 hr tablet [BUPROPION (WELLBUTRIN XL) 300 MG 24 HR TABLET] Take 300 mg by mouth every morning.       cholecalciferol, vitamin D3, (VITAMIN D3) 5,000 unit Tab Take 10,000 mcg by mouth daily       esomeprazole (NEXIUM) 20 MG capsule [ESOMEPRAZOLE (NEXIUM) 20 MG CAPSULE] Take 20 mg by mouth daily before breakfast.       etonogestreL (NEXPLANON) 68 mg Impl implant [ETONOGESTREL (NEXPLANON) 68 MG IMPL IMPLANT] 1 each by Subdermal route once.       magnesium citrate 125 mg cap [MAGNESIUM CITRATE 125 MG CAP] Take 2 capsules by mouth daily.       traZODone (DESYREL) 50 MG tablet [TRAZODONE (DESYREL) 50 MG TABLET] Take 50 mg by mouth at bedtime.         ALLERGIES:  Allergies   Allergen Reactions     Amoxicillin Rash     Penicillins Rash     Septra [Sulfamethoxazole W/Trimethoprim] Rash     Sulfamethizole Rash       LABS/IMAGING/MEDICAL RECORDS REVIEW: routine initial labs ordered    PHYSICAL EXAM:  /80   Ht 1.676 m (5' 6\")   Wt 148.8 kg (328 lb)   Breastfeeding No   BMI 52.94 kg/m      GEN: Alert " and oriented in no acute distress.   HEENT: mucous membranes moist  NECK: supple with LAD or thyromegaly  LUNGS: CTA without wheezes or crackles, good air movement throughout  CV: RRR no MRG  ABDOMEN: soft, NT, mild protuberance  SKIN: no rashes, no skin tags, no acanthosis nigricans      In summary, Ernestina Merlos has Class III obesity with a body mass index of Body mass index is 52.94 kg/m . kg/m2 and the comorbidities stated above. She completed an informational seminar and is a candidate for the undecided bariatric surgery.  Both RNY gastric bypass and Sleeve gastrectomy were discussed and written information was given.   Once the patient has completed the requirements in their task list and there are no further recommendations, the pt will be allowed to see the surgeon of her choice for consultation on the bariatric surgery. Patient verbalizes understanding of the process to surgery and expectations for the postoperative period including the need for lifelong lifestyle changes, vitamin supplementation, and laboratory monitoring.  Sincerely,     EVITA Mccoy MD    Total time spent on the date of this encounter doing: chart review, review of test results, patient visit, physical exam, education, counseling, developing plan of care and documenting = 47 minutes.      Again, thank you for allowing me to participate in the care of your patient.        Sincerely,        EVITA Mccoy MD

## 2023-04-04 NOTE — PROGRESS NOTES
"New Bariatric Surgery Consultation Note    2023    RE: Ernestina Merlos  MR#: [id number]  : 1990      Referring provider:        View : No data to display.                Chief Complaint/Reason for visit: evaluation for possible weight loss surgery    Dear Deann Luciano PA (General),    I had the pleasure of seeing your patient, Ernestina Merlos, to evaluate her obesity and consider her for possible weight loss surgery. As you know, Ernestina Merlos is 32 year old.  She has a height of 5' 6\", a weight of 328 lbs 0 oz, and calculated Body mass index is 52.94 kg/m . Patient started the surgical preparation in May 2021. She was lost to follow up for over one year but met with the dietician last month.        HISTORY OF PRESENT ILLNESS:      4/3/2023     7:29 PM   Weight Loss History Reviewed with Patient   How long have you been overweight? Since puberty   What is the most that you have ever weighed 330   What is the most weight you have lost? 60   I have tried the following methods to lose weight Watching portions or calories    Exercise    Weight Watchers       CO-MORBIDITIES OF OBESITY INCLUDE:      4/3/2023     7:29 PM   --   I have the following health issues associated with obesity Sleep Apnea - she thinks it resolved with tonsilectomy   GERD (Reflux) - requiring daily medication   Fatty Liver       PAST MEDICAL HISTORY:  Past Medical History:   Diagnosis Date     ADHD      Arthritis      Chicken pox      Depression      Dysmenorrhea      GERD (gastroesophageal reflux disease)      Hiatal hernia      Microscopic colitis      Panic attack      Raynaud disease      Shortness of breath      Sleep apnea     uses CPAP     Vitamin D deficiency        PAST SURGICAL HISTORY:  Past Surgical History:   Procedure Laterality Date     US ASPIRATION HEMATOMA SEROMA OR FLUID COLLECTION  3/12/2020     ZZC LAP,CHOLECYSTECTOMY/EXPLORE N/A 2021    Procedure: CHOLECYSTECTOMY, LAPAROSCOPIC;  Surgeon: Judy" MD Moise;  Location: [facility];  Service: General     No personal history of anesthesia problems0.    FAMILY HISTORY:   Family History   Problem Relation Age of Onset     Skin Cancer Mother      Crohn's Disease Mother      Colitis Mother      Hypertension Father        SOCIAL HISTORY:       4/3/2023     7:29 PM   Social History Questions Reviewed With Patient   Which best describes your employment status (select all that apply) I work full-time   If you work, what is your occupation?    Which best describes your marital status single   Who do you have in your support network that can be available to help you for the first 2 weeks after surgery? All my family lives within 30 minutes of me   Who can you count on for support throughout your weight loss surgery journey? I have family who have had the surgery and a supportive friend group       HABITS:      4/3/2023     7:29 PM   --   How often do you drink alcohol? 2-4 times a month   If you do drink alcohol, how many drinks might you have in a day? (one drink = 5 oz. wine, 1 can/bottle of beer, 1 shot liquor) 3 or 4   Do you currently use any of the following Nicotine products? No   Have you ever used any of the following nicotine products? No       PSYCHOLOGICAL HISTORY:       4/3/2023     7:29 PM   Psychological History Reviewed With Patient   Have you ever attempted suicide? Never.   Have you ever been hospitalized for mental illness or a suicide attempt? Never.   Are you currently being treated for any of the following? (select all that apply) Depression    Anxiety    ADHD       ROS:      4/3/2023     7:29 PM   --   Skin None of the above   HEENT Headaches    Dizziness/lightheadedness   Musculoskeletal Back pain    Arthritis   Cardiovascular Shortness of breath with activity   Pulmonary Shortness of breath with activity    Experience morning headaches   Gastrointestinal Heartburn    Reflux    Diarrhea   Genitourinary None of the above  "  Hematological None of the above   Neurological Migraine headaches   Female only Birth control       EATING BEHAVIORS:       View : No data to display.                EXERCISE:      4/3/2023     7:29 PM   --   How often do you exercise? 1 to 2 times per week   What is the duration of your exercise (in minutes)? 30 Minutes   What types of exercise do you do? walking    home gym   What keeps you from being more active? I should be more active but I just have not gotten around to it    Too tired    Worried people will look at me       MEDICATIONS:  Current Outpatient Medications   Medication Sig Dispense Refill     buPROPion (WELLBUTRIN XL) 300 MG 24 hr tablet [BUPROPION (WELLBUTRIN XL) 300 MG 24 HR TABLET] Take 300 mg by mouth every morning.       cholecalciferol, vitamin D3, (VITAMIN D3) 5,000 unit Tab Take 10,000 mcg by mouth daily       esomeprazole (NEXIUM) 20 MG capsule [ESOMEPRAZOLE (NEXIUM) 20 MG CAPSULE] Take 20 mg by mouth daily before breakfast.       etonogestreL (NEXPLANON) 68 mg Impl implant [ETONOGESTREL (NEXPLANON) 68 MG IMPL IMPLANT] 1 each by Subdermal route once.       magnesium citrate 125 mg cap [MAGNESIUM CITRATE 125 MG CAP] Take 2 capsules by mouth daily.       traZODone (DESYREL) 50 MG tablet [TRAZODONE (DESYREL) 50 MG TABLET] Take 50 mg by mouth at bedtime.         ALLERGIES:  Allergies   Allergen Reactions     Amoxicillin Rash     Penicillins Rash     Septra [Sulfamethoxazole W/Trimethoprim] Rash     Sulfamethizole Rash       LABS/IMAGING/MEDICAL RECORDS REVIEW: routine initial labs ordered    PHYSICAL EXAM:  /80   Ht 1.676 m (5' 6\")   Wt 148.8 kg (328 lb)   Breastfeeding No   BMI 52.94 kg/m      GEN: Alert and oriented in no acute distress.   HEENT: mucous membranes moist  NECK: supple with LAD or thyromegaly  LUNGS: CTA without wheezes or crackles, good air movement throughout  CV: RRR no MRG  ABDOMEN: soft, NT, mild protuberance  SKIN: no rashes, no skin tags, no acanthosis " nigricans      In summary, Ernestina Merlos has Class III obesity with a body mass index of Body mass index is 52.94 kg/m . kg/m2 and the comorbidities stated above. She completed an informational seminar and is a candidate for the undecided bariatric surgery.  Both RNY gastric bypass and Sleeve gastrectomy were discussed and written information was given.   Once the patient has completed the requirements in their task list and there are no further recommendations, the pt will be allowed to see the surgeon of her choice for consultation on the bariatric surgery. Patient verbalizes understanding of the process to surgery and expectations for the postoperative period including the need for lifelong lifestyle changes, vitamin supplementation, and laboratory monitoring.  Sincerely,     EVITA Mccoy MD    Total time spent on the date of this encounter doing: chart review, review of test results, patient visit, physical exam, education, counseling, developing plan of care and documenting = 47 minutes.

## 2023-04-06 ENCOUNTER — LAB (OUTPATIENT)
Dept: LAB | Facility: CLINIC | Age: 33
End: 2023-04-06
Payer: COMMERCIAL

## 2023-04-06 ENCOUNTER — TELEPHONE (OUTPATIENT)
Dept: LAB | Facility: CLINIC | Age: 33
End: 2023-04-06
Payer: COMMERCIAL

## 2023-04-06 DIAGNOSIS — E66.01 MORBID OBESITY (H): ICD-10-CM

## 2023-04-06 PROCEDURE — 36415 COLL VENOUS BLD VENIPUNCTURE: CPT

## 2023-04-06 PROCEDURE — 99000 SPECIMEN HANDLING OFFICE-LAB: CPT

## 2023-04-06 PROCEDURE — 84630 ASSAY OF ZINC: CPT | Mod: 90

## 2023-04-06 NOTE — PROGRESS NOTES
Called patient to let her know she will need to come back to have another blood sample taken for her Zinc test.  She will return at her soonest convenience.

## 2023-04-07 LAB
ANNOTATION COMMENT IMP: NORMAL
RETINYL PALMITATE SERPL-MCNC: 0.02 MG/L
VIT A SERPL-MCNC: 0.63 MG/L
VIT B1 PYROPHOSHATE BLD-SCNC: 160 NMOL/L
ZINC SERPL-MCNC: 89.8 UG/DL

## 2023-04-20 ENCOUNTER — VIRTUAL VISIT (OUTPATIENT)
Dept: SURGERY | Facility: CLINIC | Age: 33
End: 2023-04-20
Payer: COMMERCIAL

## 2023-04-20 DIAGNOSIS — E66.01 MORBID OBESITY (H): Primary | ICD-10-CM

## 2023-04-20 NOTE — PROGRESS NOTES
Virtual Visit Details    Type of service:  Video Visit     Originating Location (pt. Location): Home  Distant Location (provider location):  Off-site  Platform used for Video Visit: Kacy    Start Time: 1:45 PM  End Time: 2:30 PM    Ernestina returned to session after almost 2 years as she admitted that she was feeling somewhat anxious about surgery after listening to people on Facebook. She stopped working at [employer] and, thus, her schedule is more consistent. She feels that will return to see her psychotherapist after the loss of her grandfather and going through the process of surgery. She will follow up and complete update psychological testing. F32.9; F41.9; F90.9; E66.01

## 2023-04-24 ENCOUNTER — VIRTUAL VISIT (OUTPATIENT)
Dept: SURGERY | Facility: CLINIC | Age: 33
End: 2023-04-24
Payer: COMMERCIAL

## 2023-04-24 DIAGNOSIS — Z71.3 NUTRITIONAL COUNSELING: ICD-10-CM

## 2023-04-24 DIAGNOSIS — E66.01 CLASS 3 SEVERE OBESITY DUE TO EXCESS CALORIES WITH SERIOUS COMORBIDITY AND BODY MASS INDEX (BMI) OF 50.0 TO 59.9 IN ADULT (H): ICD-10-CM

## 2023-04-24 DIAGNOSIS — E66.813 CLASS 3 SEVERE OBESITY DUE TO EXCESS CALORIES WITH SERIOUS COMORBIDITY AND BODY MASS INDEX (BMI) OF 50.0 TO 59.9 IN ADULT (H): ICD-10-CM

## 2023-04-24 DIAGNOSIS — K21.9 GASTROESOPHAGEAL REFLUX DISEASE WITHOUT ESOPHAGITIS: Primary | ICD-10-CM

## 2023-04-24 PROCEDURE — 97803 MED NUTRITION INDIV SUBSEQ: CPT | Mod: VID | Performed by: DIETITIAN, REGISTERED

## 2023-04-24 NOTE — PROGRESS NOTES
Ernestina Merlos is a 33 year old who is being evaluated via a billable video visit.    How would you like to obtain your AVS? MyChart  If the video visit is dropped, the invitation should be resent by: Send to e-mail at: [email or web address]  Will anyone else be joining your video visit? No          Follow Up Surgical Weight Loss Supervised Diet Evaluation    Assessment:  Pt. is being seen today for a follow up RD nutritional evaluation. Pt. has been unsuccessful with non-surgical weight loss methods and is interested in bariatric surgery. Today we reviewed current eating habits and level of physical activity, and instructed on the changes that are required for successful bariatric outcomes.    Surgery of interest per pt: LSG.    Workflow review:  Support Group: Not completed.  Psychology:Not completed.  Lab work:Completed.  SWL:No       Weight goal: At or below initial.    Anthropometrics:  Pt's weight is 328 lbs  Initial weight: 328 lbs  Weight change: 0  BMI: There is no height or weight on file to calculate BMI.   Ideal body weight: 59.3 kg (130 lb 11.7 oz)  Adjusted ideal body weight: 95.1 kg (209 lb 10.2 oz)    Estimated RMR ([place]-St Jeor equation):  2217 kcals x 1.3 (light active) = 2882 kcals (for weight maintenance)    Medical History:  Patient Active Problem List   Diagnosis     Morbid obesity (H)     Calculus of gallbladder without cholecystitis without obstruction     Gastroesophageal reflux disease without esophagitis     Psoriatic arthritis (H)      Diabetes: No  HbA1c:  No results found for: HGBA1C    Progress over past month: Has been taking a Vitamin D and Mg on a daily basis.  Has been working on being consistent with eating 3 meals/day and is trying to focus on eating her protein first.       Diet Recall/Time  Breakfast: overnight oats or protein shake   Lunch: protein shake or leftovers from the night before   Dinner: taco salad or chicken deshawn     Typical Snacks: string cheese or veggies        Meal duration: continues to work on, challenging at times.      fluids by 30 minutes before, during meal, and waiting 30 minutes after meal before drinking fluids: No-has been actively thinking about it    Beverages  Water, SF Flavoring (averaging 32-48 oz/day), pop-working on reduced consumption    Exercise  Walking outside 2-3 times/week for 1 mile (20 minutes)    PES statement:   Obesity (NC 3.3) related to overeating and poor lifestyle habits as evidenced by patient's subjective statements and BMI of 52.94 kg/m2.   Intervention    Nutrition Education:   1. Provided general overview of diet and lifestyle modifications needed to be a deemed a safe candidate for bariatric surgery.     Food/Nutrient Delivery:  2. Educated patient on eating three meals, with cutting out snacking.  3. Bariatric Plate: Patient and I discussed the importance of including a lean protein source (20-30 grams/meal), vegetables (included at lunch and dinner), one serving (15g) of carbohydrate, and limited added fat (1 tb/day) at each meal.   4. Discussed importance of adequate hydration after surgery, with goal of at least 64 oz of fluids/day.  5. Addressed avoiding all carbonated, caffeinated and sweetened drinks to prepare for bariatric surgery.     Nutrition Counselin. Mindful eating techniques: Encouraged slow meal pace, chewing foods to applesauce consistency for 20-30 minutes/meal.   7. Discussed  fluids 30 minutes before, during, and after meal to prevent dumping syndrome and discomfort post bariatric surgery.       Instructions/Goals:     1. Continue implementing bariatric surgery lifestyle modifications.      Handouts Provided:   [facility] Bariatric Surgery Nutrition Info      Monitor/Evaluation:  Pt. s target weight:  no gain from initial visit, pt. verbalized understanding.     Plan for next visit:     Educate on dumping syndrome and reading food labels.  (Final supervised diet visit with RD)  pre/post-op  diet progression, give review of surgery process.      Video-Visit Details    Type of service:  Video Visit    Video Start Time (time video started): 12:47 pm    Video End Time (time video stopped): 1:00 pm    Originating Location (pt. Location): Home        Distant Location (provider location):  Off-site    Mode of Communication:  Video Conference via [facility]    Physician has received verbal consent for a Video Visit from the patient? Yes      Hayley Rubalcava, RD

## 2023-04-24 NOTE — LETTER
4/24/2023         RE: Ernestina Merlos  [address]  [facility] [address]        Dear Colleague,    Thank you for referring your patient, Ernestina Merlos, to the [facility] SURGERY CLINIC AND BARIATRICS CARE [place]. Please see a copy of my visit note below.    Ernestina Merlos is a 33 year old who is being evaluated via a billable video visit.    How would you like to obtain your AVS? MyChart  If the video visit is dropped, the invitation should be resent by: Send to e-mail at: [email or web address]  Will anyone else be joining your video visit? No          Follow Up Surgical Weight Loss Supervised Diet Evaluation    Assessment:  Pt. is being seen today for a follow up RD nutritional evaluation. Pt. has been unsuccessful with non-surgical weight loss methods and is interested in bariatric surgery. Today we reviewed current eating habits and level of physical activity, and instructed on the changes that are required for successful bariatric outcomes.    Surgery of interest per pt: LSG.    Workflow review:  Support Group: Not completed.  Psychology:Not completed.  Lab work:Completed.  SWL:No       Weight goal: At or below initial.    Anthropometrics:  Pt's weight is 328 lbs  Initial weight: 328 lbs  Weight change: 0  BMI: There is no height or weight on file to calculate BMI.   Ideal body weight: 59.3 kg (130 lb 11.7 oz)  Adjusted ideal body weight: 95.1 kg (209 lb 10.2 oz)    Estimated RMR ([place]-St Jeor equation):  2217 kcals x 1.3 (light active) = 2882 kcals (for weight maintenance)    Medical History:  Patient Active Problem List   Diagnosis     Morbid obesity (H)     Calculus of gallbladder without cholecystitis without obstruction     Gastroesophageal reflux disease without esophagitis     Psoriatic arthritis (H)      Diabetes: No  HbA1c:  No results found for: HGBA1C    Progress over past month: Has been taking a Vitamin D and Mg on a daily basis.  Has been working on being consistent with  eating 3 meals/day and is trying to focus on eating her protein first.       Diet Recall/Time  Breakfast: overnight oats or protein shake   Lunch: protein shake or leftovers from the night before   Dinner: taco salad or chicken deshawn     Typical Snacks: string cheese or veggies       Meal duration: continues to work on, challenging at times.      fluids by 30 minutes before, during meal, and waiting 30 minutes after meal before drinking fluids: No-has been actively thinking about it    Beverages  Water, SF Flavoring (averaging 32-48 oz/day), pop-working on reduced consumption    Exercise  Walking outside 2-3 times/week for 1 mile (20 minutes)    PES statement:   Obesity (NC 3.3) related to overeating and poor lifestyle habits as evidenced by patient's subjective statements and BMI of 52.94 kg/m2.   Intervention    Nutrition Education:   1. Provided general overview of diet and lifestyle modifications needed to be a deemed a safe candidate for bariatric surgery.     Food/Nutrient Delivery:  2. Educated patient on eating three meals, with cutting out snacking.  3. Bariatric Plate: Patient and I discussed the importance of including a lean protein source (20-30 grams/meal), vegetables (included at lunch and dinner), one serving (15g) of carbohydrate, and limited added fat (1 tb/day) at each meal.   4. Discussed importance of adequate hydration after surgery, with goal of at least 64 oz of fluids/day.  5. Addressed avoiding all carbonated, caffeinated and sweetened drinks to prepare for bariatric surgery.     Nutrition Counselin. Mindful eating techniques: Encouraged slow meal pace, chewing foods to applesauce consistency for 20-30 minutes/meal.   7. Discussed  fluids 30 minutes before, during, and after meal to prevent dumping syndrome and discomfort post bariatric surgery.       Instructions/Goals:     1. Continue implementing bariatric surgery lifestyle modifications.      Handouts  Provided:   [facility] Bariatric Surgery Nutrition Info      Monitor/Evaluation:  Pt. s target weight:  no gain from initial visit, pt. verbalized understanding.     Plan for next visit:     Educate on dumping syndrome and reading food labels.  (Final supervised diet visit with RD) pre/post-op  diet progression, give review of surgery process.      Video-Visit Details    Type of service:  Video Visit    Video Start Time (time video started): 12:47 pm    Video End Time (time video stopped): 1:00 pm    Originating Location (pt. Location): Home        Distant Location (provider location):  Off-site    Mode of Communication:  Video Conference via [facility]    Physician has received verbal consent for a Video Visit from the patient? Yes      Hayley Rubalcava RD              Again, thank you for allowing me to participate in the care of your patient.        Sincerely,        Hayley Rubalcava RD

## 2023-05-04 ENCOUNTER — VIRTUAL VISIT (OUTPATIENT)
Dept: SURGERY | Facility: CLINIC | Age: 33
End: 2023-05-04
Payer: COMMERCIAL

## 2023-05-04 DIAGNOSIS — E66.01 MORBID OBESITY (H): Primary | ICD-10-CM

## 2023-05-04 NOTE — PROGRESS NOTES
Virtual Visit Details    Type of service:  Video Visit     Originating Location (pt. Location): Home  Distant Location (provider location):  Off-site  Platform used for Video Visit: [employer]    Start Time: 11:15 AM  End Time: 11:55 AM    Ernestina has made quality changes in eating and lifestyle and appears more mindful about her eating. She is now ready to proceed with surgery. A report was sent to the clinic. F32.9; F41.9; F90.9; E66.01

## 2023-05-12 ENCOUNTER — VIRTUAL VISIT (OUTPATIENT)
Dept: SURGERY | Facility: CLINIC | Age: 33
End: 2023-05-12
Payer: COMMERCIAL

## 2023-05-12 DIAGNOSIS — E66.01 CLASS 3 SEVERE OBESITY DUE TO EXCESS CALORIES WITH SERIOUS COMORBIDITY AND BODY MASS INDEX (BMI) OF 50.0 TO 59.9 IN ADULT (H): Primary | ICD-10-CM

## 2023-05-12 DIAGNOSIS — Z71.3 NUTRITIONAL COUNSELING: ICD-10-CM

## 2023-05-12 DIAGNOSIS — E66.813 CLASS 3 SEVERE OBESITY DUE TO EXCESS CALORIES WITH SERIOUS COMORBIDITY AND BODY MASS INDEX (BMI) OF 50.0 TO 59.9 IN ADULT (H): Primary | ICD-10-CM

## 2023-05-12 DIAGNOSIS — K21.9 GASTROESOPHAGEAL REFLUX DISEASE WITHOUT ESOPHAGITIS: ICD-10-CM

## 2023-05-12 PROCEDURE — 97803 MED NUTRITION INDIV SUBSEQ: CPT | Mod: VID | Performed by: DIETITIAN, REGISTERED

## 2023-05-12 NOTE — PROGRESS NOTES
Ernestina Merlos is a 33 year old who is being evaluated via a billable video visit.      How would you like to obtain your AVS? MyChart  If the video visit is dropped, the invitation should be resent by: Send to e-mail at: [email or web address]  Will anyone else be joining your video visit? No          Follow Up Surgical Weight Loss Supervised Diet Evaluation    Assessment:  Pt. is being seen today for a follow up RD nutritional evaluation. Pt. has been unsuccessful with non-surgical weight loss methods and is interested in bariatric surgery. Today we reviewed current eating habits and level of physical activity, and instructed on the changes that are required for successful bariatric outcomes.    Surgery of interest per pt: LSG.    Workflow review:  Support Group: Not completed.-Plans to attend in June  Psychology:Completed.  Lab work:Completed.  SWL:No       Weight goal: At or below initial.    Anthropometrics:  Pt's weight is 328 lbs  Initial weight: 328 lbs   Weight change: 0  BMI: There is no height or weight on file to calculate BMI.   Ideal body weight: 59.3 kg (130 lb 11.7 oz)  Adjusted ideal body weight: 95.1 kg (209 lb 10.2 oz)    Medical History:  Patient Active Problem List   Diagnosis     Morbid obesity (H)     Calculus of gallbladder without cholecystitis without obstruction     Gastroesophageal reflux disease without esophagitis     Psoriatic arthritis (H)     HbA1c:  No results found for: HGBA1C   Vitamins: Vitamin D, Mg     Progress over past month: Continues to focus on protein first along with reducing eating out frequency to less than 2 times/week.     Diet Recall/Time  Breakfast: egg scramble (eggs, sausage, bell peppers, and cheese)  Lunch: protein shake   Dinner: salad with chicken or pizza or taco salad     Typical Snacks: trying to reduce-cheese and grapes       Meal duration: continues to work on, has even purchased smaller plates and utensils      fluids by 30 minutes before,  during meal, and waiting 30 minutes after meal before drinking fluids: Yes-has been setting a timer to remind self    Beverages  3-4 water bottles/day of water, pop (down to 1 can every few days)    Exercise  Walking weather pending for 1 mile (20 minutes)    PES statement:   Obesity (NC 3.3) related to overeating and poor lifestyle habits as evidenced by patient's subjective statements and BMI of 52.94 kg/m2.     Intervention    Nutrition Education:   1. Provided general overview of diet and lifestyle modifications needed to be a deemed a safe candidate for bariatric surgery.     Food/Nutrient Delivery:  2. Educated patient on eating three meals, with cutting out snacking.  3. Bariatric Plate: Patient and I discussed the importance of including a lean protein source (20-30 grams/meal), vegetables (included at lunch and dinner), one serving (15g) of carbohydrate, and limited added fat (1 tb/day) at each meal.   4. Educated patient on using a protein powder drink as a meal replacement and/or supplement after bariatric surgery.   5. Discussed importance of adequate hydration after surgery, with goal of at least 64 oz of fluids/day.  6. Addressed avoiding all carbonated, caffeinated and sweetened drinks to prepare for bariatric surgery.     Nutrition Counselin. Mindful eating techniques: Encouraged slow meal pace, chewing foods to applesauce consistency for 20-30 minutes/meal.   8. Discussed  fluids 30 minutes before, during, and after meal to prevent dumping syndrome and discomfort post bariatric surgery.   9. Discussed pre/post operative diet progression, post op vitamin regimen, gave review of surgery process.     Instructions/Goals:     1. Continue implementing bariatric surgery lifestyle modifications.    Handouts Provided:   [facility] Bariatric Surgery Nutrition Info  Protein supplement list    Monitor/Evaluation:  Pt. s target weight:  no gain from initial visit, pt. verbalized  understanding.     Pt has completed all nutrition requirements and is well-informed of the dietary and physical activity requirements that are necessary for successful bariatric outcomes. This pt is an appropriate candidate for surgery from a nutrition standpoint at this time. The patient understands that surgery is a tool, not a cure, and post operative follow up is essential.     Plan for next visit:   Post Op Nutrition         Video-Visit Details    Type of service:  Video Visit    Video Start Time (time video started): 10:47 am     Video End Time (time video stopped): 10:56 am    Originating Location (pt. Location): Home        Distant Location (provider location):  Off-site    Mode of Communication:  Video Conference via [facility]    Physician has received verbal consent for a Video Visit from the patient? Yes      Hayley Rubalcava, RD

## 2023-05-12 NOTE — LETTER
5/12/2023         RE: Ernestina Merlos  [address]  [facility] [address]        Dear Colleague,    Thank you for referring your patient, Ernestina Merlos, to the [facility] SURGERY CLINIC AND BARIATRICS CARE [place]. Please see a copy of my visit note below.    Ernestina Merlos is a 33 year old who is being evaluated via a billable video visit.      How would you like to obtain your AVS? MyChart  If the video visit is dropped, the invitation should be resent by: Send to e-mail at: [email or web address]  Will anyone else be joining your video visit? No          Follow Up Surgical Weight Loss Supervised Diet Evaluation    Assessment:  Pt. is being seen today for a follow up RD nutritional evaluation. Pt. has been unsuccessful with non-surgical weight loss methods and is interested in bariatric surgery. Today we reviewed current eating habits and level of physical activity, and instructed on the changes that are required for successful bariatric outcomes.    Surgery of interest per pt: LSG.    Workflow review:  Support Group: Not completed.-Plans to attend in June  Psychology:Completed.  Lab work:Completed.  SWL:No       Weight goal: At or below initial.    Anthropometrics:  Pt's weight is 328 lbs  Initial weight: 328 lbs   Weight change: 0  BMI: There is no height or weight on file to calculate BMI.   Ideal body weight: 59.3 kg (130 lb 11.7 oz)  Adjusted ideal body weight: 95.1 kg (209 lb 10.2 oz)    Medical History:  Patient Active Problem List   Diagnosis     Morbid obesity (H)     Calculus of gallbladder without cholecystitis without obstruction     Gastroesophageal reflux disease without esophagitis     Psoriatic arthritis (H)     HbA1c:  No results found for: HGBA1C   Vitamins: Vitamin D, Mg     Progress over past month: Continues to focus on protein first along with reducing eating out frequency to less than 2 times/week.     Diet Recall/Time  Breakfast: egg scramble (eggs, sausage, bell  peppers, and cheese)  Lunch: protein shake   Dinner: salad with chicken or pizza or taco salad     Typical Snacks: trying to reduce-cheese and grapes       Meal duration: continues to work on, has even purchased smaller plates and utensils      fluids by 30 minutes before, during meal, and waiting 30 minutes after meal before drinking fluids: Yes-has been setting a timer to remind self    Beverages  3-4 water bottles/day of water, pop (down to 1 can every few days)    Exercise  Walking weather pending for 1 mile (20 minutes)    PES statement:   Obesity (NC 3.3) related to overeating and poor lifestyle habits as evidenced by patient's subjective statements and BMI of 52.94 kg/m2.     Intervention    Nutrition Education:   1. Provided general overview of diet and lifestyle modifications needed to be a deemed a safe candidate for bariatric surgery.     Food/Nutrient Delivery:  2. Educated patient on eating three meals, with cutting out snacking.  3. Bariatric Plate: Patient and I discussed the importance of including a lean protein source (20-30 grams/meal), vegetables (included at lunch and dinner), one serving (15g) of carbohydrate, and limited added fat (1 tb/day) at each meal.   4. Educated patient on using a protein powder drink as a meal replacement and/or supplement after bariatric surgery.   5. Discussed importance of adequate hydration after surgery, with goal of at least 64 oz of fluids/day.  6. Addressed avoiding all carbonated, caffeinated and sweetened drinks to prepare for bariatric surgery.     Nutrition Counselin. Mindful eating techniques: Encouraged slow meal pace, chewing foods to applesauce consistency for 20-30 minutes/meal.   8. Discussed  fluids 30 minutes before, during, and after meal to prevent dumping syndrome and discomfort post bariatric surgery.   9. Discussed pre/post operative diet progression, post op vitamin regimen, gave review of surgery process.      Instructions/Goals:     1. Continue implementing bariatric surgery lifestyle modifications.    Handouts Provided:   [facility] Bariatric Surgery Nutrition Info  Protein supplement list    Monitor/Evaluation:  Pt. s target weight:  no gain from initial visit, pt. verbalized understanding.     Pt has completed all nutrition requirements and is well-informed of the dietary and physical activity requirements that are necessary for successful bariatric outcomes. This pt is an appropriate candidate for surgery from a nutrition standpoint at this time. The patient understands that surgery is a tool, not a cure, and post operative follow up is essential.     Plan for next visit:   Post Op Nutrition         Video-Visit Details    Type of service:  Video Visit    Video Start Time (time video started): 10:47 am     Video End Time (time video stopped): 10:56 am    Originating Location (pt. Location): Home        Distant Location (provider location):  Off-site    Mode of Communication:  Video Conference via [facility]    Physician has received verbal consent for a Video Visit from the patient? Yes      Hayley Rubalcava RD              Again, thank you for allowing me to participate in the care of your patient.        Sincerely,        Hayley Rubalcava RD

## 2023-06-22 ENCOUNTER — OFFICE VISIT (OUTPATIENT)
Dept: SURGERY | Facility: CLINIC | Age: 33
End: 2023-06-22
Payer: COMMERCIAL

## 2023-06-22 VITALS
DIASTOLIC BLOOD PRESSURE: 90 MMHG | HEIGHT: 66 IN | WEIGHT: 293 LBS | BODY MASS INDEX: 47.09 KG/M2 | SYSTOLIC BLOOD PRESSURE: 142 MMHG

## 2023-06-22 DIAGNOSIS — E66.01 CLASS 3 SEVERE OBESITY DUE TO EXCESS CALORIES WITH SERIOUS COMORBIDITY AND BODY MASS INDEX (BMI) OF 50.0 TO 59.9 IN ADULT (H): Primary | ICD-10-CM

## 2023-06-22 DIAGNOSIS — E66.813 CLASS 3 SEVERE OBESITY DUE TO EXCESS CALORIES WITH SERIOUS COMORBIDITY AND BODY MASS INDEX (BMI) OF 50.0 TO 59.9 IN ADULT (H): Primary | ICD-10-CM

## 2023-06-22 PROCEDURE — 99214 OFFICE O/P EST MOD 30 MIN: CPT | Performed by: SURGERY

## 2023-06-22 RX ORDER — CEFAZOLIN SODIUM/WATER 3 G/30 ML
3 SYRINGE (ML) INTRAVENOUS SEE ADMIN INSTRUCTIONS
Status: CANCELLED | OUTPATIENT
Start: 2023-08-22

## 2023-06-22 RX ORDER — HEPARIN SODIUM 5000 [USP'U]/.5ML
5000 INJECTION, SOLUTION INTRAVENOUS; SUBCUTANEOUS ONCE
Status: CANCELLED | OUTPATIENT
Start: 2023-08-22 | End: 2023-06-22

## 2023-06-22 RX ORDER — GABAPENTIN 300 MG/1
600 CAPSULE ORAL
Status: CANCELLED | OUTPATIENT
Start: 2023-08-22

## 2023-06-22 RX ORDER — ONDANSETRON 2 MG/ML
4 INJECTION INTRAMUSCULAR; INTRAVENOUS
Status: CANCELLED | OUTPATIENT
Start: 2023-08-22

## 2023-06-22 RX ORDER — CELECOXIB 100 MG/1
400 CAPSULE ORAL
Status: CANCELLED | OUTPATIENT
Start: 2023-06-22

## 2023-06-22 RX ORDER — ACETAMINOPHEN 325 MG/1
975 TABLET ORAL ONCE
Status: CANCELLED | OUTPATIENT
Start: 2023-08-22 | End: 2023-06-22

## 2023-06-22 RX ORDER — CEFAZOLIN SODIUM/WATER 3 G/30 ML
3 SYRINGE (ML) INTRAVENOUS
Status: CANCELLED | OUTPATIENT
Start: 2023-08-22

## 2023-06-22 NOTE — LETTER
6/22/2023         RE: Ernestina Merlos  [address]  [place] [address]        Dear Colleague,    Thank you for referring your patient, Ernestina Merlos, to the [facility] SURGERY CLINIC AND BARIATRICS CARE [place]. Please see a copy of my visit note below.    HPI: Ernestina Merlos is a 33 year old female here today for consideration of metabolic and bariatric surgery. She is referred by Deann Luciano PA-C.  She has struggled with obesity for much of her life, noting increase in her weight through her teenage years following cessation of stimulants she had been taking for ADHD.  She has actually been through our program previously in 2021, but declined to follow through on bariatric surgery due to concerns about postoperative complications.  Over the past 2 years she has had more time to consider those potential downsides and learn more about the risks and benefits of bariatric surgery and is now more eager to pursue bariatric surgery as a means of improving her health.     Her bariatric comorbidities include GERD, with nearly daily symptoms despite PPI therapy, obstructive sleep apnea which has been improved following tonsillectomy.    Bariatric comorbidities not present include type 2 diabetes, hypertension.      Allergies:Amoxicillin, Penicillins, Septra [sulfamethoxazole-trimethoprim], and Sulfamethizole    Past Medical History:   Diagnosis Date     ADHD      Arthritis      Chicken pox 1997     Depression      Dysmenorrhea      GERD (gastroesophageal reflux disease)      Hiatal hernia      Microscopic colitis      Panic attack      Raynaud disease      Shortness of breath      Sleep apnea     uses CPAP     Vitamin D deficiency        Past Surgical History:   Procedure Laterality Date     US ASPIRATION HEMATOMA SEROMA OR FLUID COLLECTION  3/12/2020     ZZC LAP,CHOLECYSTECTOMY/EXPLORE N/A 4/28/2021    Procedure: CHOLECYSTECTOMY, LAPAROSCOPIC;  Surgeon: Moise Kim MD;  Location: [facility]  "Main OR;  Service: General       CURRENT MEDS:  Prior to Admission medications    Medication Sig Start Date End Date Taking? Authorizing Provider   buPROPion (WELLBUTRIN XL) 300 MG 24 hr tablet [BUPROPION (WELLBUTRIN XL) 300 MG 24 HR TABLET] Take 300 mg by mouth every morning. 5/7/21  Yes Provider, Historical   cholecalciferol, vitamin D3, (VITAMIN D3) 5,000 unit Tab Take 10,000 mcg by mouth daily 10/15/20  Yes Provider, Historical   esomeprazole (NEXIUM) 20 MG capsule [ESOMEPRAZOLE (NEXIUM) 20 MG CAPSULE] Take 20 mg by mouth daily before breakfast. 10/15/20  Yes Provider, Historical   etonogestreL (NEXPLANON) 68 mg Impl implant [ETONOGESTREL (NEXPLANON) 68 MG IMPL IMPLANT] 1 each by Subdermal route once. 10/15/20  Yes Provider, Historical   magnesium citrate 125 mg cap [MAGNESIUM CITRATE 125 MG CAP] Take 2 capsules by mouth daily. 10/15/20  Yes Provider, Historical   traZODone (DESYREL) 50 MG tablet [TRAZODONE (DESYREL) 50 MG TABLET] Take 50 mg by mouth at bedtime. 10/15/20  Yes Provider, Historical         Social Connections: Not on file       Family History   Problem Relation Age of Onset     Skin Cancer Mother      Crohn's Disease Mother      Colitis Mother      Hypertension Father         reports that she has never smoked. She has never used smokeless tobacco. She reports current alcohol use. She reports that she does not currently use drugs.    History   Smoking Status     Never   Smokeless Tobacco     Never       Review of Systems -  A complete ROS was reviewed and except for what is listed in the HPI above, all others are negative  PSYCHIATRIC: She has undergone a lifestyle assessment and has been deemed a good candidate for bariatric surgery by the psychologist.    BP (!) 142/90 (BP Location: Right arm, Patient Position: Sitting)   Ht 1.676 m (5' 6\")   Wt (!) 151.7 kg (334 lb 8 oz)   BMI 53.99 kg/m      Wt Readings from Last 4 Encounters:   06/22/23 (!) 151.7 kg (334 lb 8 oz)   04/04/23 148.8 kg (328 lb) "   05/11/21 131.5 kg (290 lb)   04/28/21 133.8 kg (295 lb)        Body mass index is 53.99 kg/m .    EXAM:  GENERAL: This is a well-developed 33 year old female who appears her stated age  HEAD & NECK: Grossly normal.  No visible goiter or scars  CARDIAC: Regular rate and rhythm  CHEST/LUNG: Normal respiratory effort  ABDOMEN: Obese.  No visible hernias or masses appreciated.  LYMPHATIC:  No significant adenopathy visualized.    EXTREMITIES: Grossly normal.  No evidence of chronic venous stasis.    NEUROLOGIC: Focally intact  INTEGUMENT: No open lesions or ulcers appreciated visually  PSYCHIATRIC: Normal affect. She has a good grasp on the nature of her obesity and the treatment options.    LABS:      Assessment/Plan: 33 year old female who is an excellent candidate for bariatric and metabolic surgery.  After a careful conversation with the patient it was decided that a laparoscopic Jesse-en-Y gastric bypass would be her best option due to her underlying reflux disease.       I went over the surgery in detail with her.  I went over the nature of the operation and some of the potential consequences of the surgery.  I went over the expected hospital course and discussed laparoscopic versus open surgery, understanding that we will plan on doing this laparoscopically with the possibility of having to convert to an open operation.  I went over some of the risks and complications of the operation including, but not limited to, DVT, pulmonary emboli, pneumonia, postoperative bleeding, wound infection, staple line leak, intra-abdominal sepsis, and possible death.  I also went over some of the potential nutritional concerns such as vitamin B-12, iron, vitamin D, vitamin A, calcium and protein deficiencies.  I will also went over the need for lifelong nutritional surveillance.  This includes our regular scheduled follow up of a 1 week post op dietician visit, 2 week post op surgeon visit, an then additional dietician visits at  1 month, 3 months, and 9 months.  They are to follow up with a physician in our program at 6 months and 1 year, and annual thereafter.  The patient understands and wants to proceed with surgery.  We will submit for prior authorization.      Moise Kim MD ,MD  [facility] Department of Surgery      Again, thank you for allowing me to participate in the care of your patient.        Sincerely,        Moise Kim MD

## 2023-06-22 NOTE — PROGRESS NOTES
HPI: Ernestina Merlos is a 33 year old female here today for consideration of metabolic and bariatric surgery. She is referred by Deann Luciano PA-C.  She has struggled with obesity for much of her life, noting increase in her weight through her teenage years following cessation of stimulants she had been taking for ADHD.  She has actually been through our program previously in 2021, but declined to follow through on bariatric surgery due to concerns about postoperative complications.  Over the past 2 years she has had more time to consider those potential downsides and learn more about the risks and benefits of bariatric surgery and is now more eager to pursue bariatric surgery as a means of improving her health.     Her bariatric comorbidities include GERD, with nearly daily symptoms despite PPI therapy, obstructive sleep apnea which has been improved following tonsillectomy.    Bariatric comorbidities not present include type 2 diabetes, hypertension.      Allergies:Amoxicillin, Penicillins, Septra [sulfamethoxazole-trimethoprim], and Sulfamethizole    Past Medical History:   Diagnosis Date     ADHD      Arthritis      Chicken pox 1997     Depression      Dysmenorrhea      GERD (gastroesophageal reflux disease)      Hiatal hernia      Microscopic colitis      Panic attack      Raynaud disease      Shortness of breath      Sleep apnea     uses CPAP     Vitamin D deficiency        Past Surgical History:   Procedure Laterality Date     US ASPIRATION HEMATOMA SEROMA OR FLUID COLLECTION  3/12/2020     ZZC LAP,CHOLECYSTECTOMY/EXPLORE N/A 4/28/2021    Procedure: CHOLECYSTECTOMY, LAPAROSCOPIC;  Surgeon: Moise Kim MD;  Location: [facility];  Service: General       CURRENT MEDS:  Prior to Admission medications    Medication Sig Start Date End Date Taking? Authorizing Provider   buPROPion (WELLBUTRIN XL) 300 MG 24 hr tablet [BUPROPION (WELLBUTRIN XL) 300 MG 24 HR TABLET] Take 300 mg by mouth every morning. 5/7/21   "Yes Provider, Historical   cholecalciferol, vitamin D3, (VITAMIN D3) 5,000 unit Tab Take 10,000 mcg by mouth daily 10/15/20  Yes Provider, Historical   esomeprazole (NEXIUM) 20 MG capsule [ESOMEPRAZOLE (NEXIUM) 20 MG CAPSULE] Take 20 mg by mouth daily before breakfast. 10/15/20  Yes Provider, Historical   etonogestreL (NEXPLANON) 68 mg Impl implant [ETONOGESTREL (NEXPLANON) 68 MG IMPL IMPLANT] 1 each by Subdermal route once. 10/15/20  Yes Provider, Historical   magnesium citrate 125 mg cap [MAGNESIUM CITRATE 125 MG CAP] Take 2 capsules by mouth daily. 10/15/20  Yes Provider, Historical   traZODone (DESYREL) 50 MG tablet [TRAZODONE (DESYREL) 50 MG TABLET] Take 50 mg by mouth at bedtime. 10/15/20  Yes Provider, Historical         Social Connections: Not on file       Family History   Problem Relation Age of Onset     Skin Cancer Mother      Crohn's Disease Mother      Colitis Mother      Hypertension Father         reports that she has never smoked. She has never used smokeless tobacco. She reports current alcohol use. She reports that she does not currently use drugs.    History   Smoking Status     Never   Smokeless Tobacco     Never       Review of Systems -  A complete ROS was reviewed and except for what is listed in the HPI above, all others are negative  PSYCHIATRIC: She has undergone a lifestyle assessment and has been deemed a good candidate for bariatric surgery by the psychologist.    BP (!) 142/90 (BP Location: Right arm, Patient Position: Sitting)   Ht 1.676 m (5' 6\")   Wt (!) 151.7 kg (334 lb 8 oz)   BMI 53.99 kg/m      Wt Readings from Last 4 Encounters:   06/22/23 (!) 151.7 kg (334 lb 8 oz)   04/04/23 148.8 kg (328 lb)   05/11/21 131.5 kg (290 lb)   04/28/21 133.8 kg (295 lb)        Body mass index is 53.99 kg/m .    EXAM:  GENERAL: This is a well-developed 33 year old female who appears her stated age  HEAD & NECK: Grossly normal.  No visible goiter or scars  CARDIAC: Regular rate and " rhythm  CHEST/LUNG: Normal respiratory effort  ABDOMEN: Obese.  No visible hernias or masses appreciated.  LYMPHATIC:  No significant adenopathy visualized.    EXTREMITIES: Grossly normal.  No evidence of chronic venous stasis.    NEUROLOGIC: Focally intact  INTEGUMENT: No open lesions or ulcers appreciated visually  PSYCHIATRIC: Normal affect. She has a good grasp on the nature of her obesity and the treatment options.    LABS:      Assessment/Plan: 33 year old female who is an excellent candidate for bariatric and metabolic surgery.  After a careful conversation with the patient it was decided that a laparoscopic Jesse-en-Y gastric bypass would be her best option due to her underlying reflux disease.       I went over the surgery in detail with her.  I went over the nature of the operation and some of the potential consequences of the surgery.  I went over the expected hospital course and discussed laparoscopic versus open surgery, understanding that we will plan on doing this laparoscopically with the possibility of having to convert to an open operation.  I went over some of the risks and complications of the operation including, but not limited to, DVT, pulmonary emboli, pneumonia, postoperative bleeding, wound infection, staple line leak, intra-abdominal sepsis, and possible death.  I also went over some of the potential nutritional concerns such as vitamin B-12, iron, vitamin D, vitamin A, calcium and protein deficiencies.  I will also went over the need for lifelong nutritional surveillance.  This includes our regular scheduled follow up of a 1 week post op dietician visit, 2 week post op surgeon visit, an then additional dietician visits at 1 month, 3 months, and 9 months.  They are to follow up with a physician in our program at 6 months and 1 year, and annual thereafter.  The patient understands and wants to proceed with surgery.  We will submit for prior authorization.      Moise Kim MD  ,MD  [facility] Department of Surgery

## 2023-06-28 ENCOUNTER — DOCUMENTATION ONLY (OUTPATIENT)
Dept: SURGERY | Facility: CLINIC | Age: 33
End: 2023-06-28
Payer: COMMERCIAL

## 2023-06-28 NOTE — PROGRESS NOTES
I have submitted a PA to [facility] for this patient to be approved for a LRNY with Dr. Moise Kim    REF # [id number]  Jason [id number]

## 2023-07-10 ENCOUNTER — DOCUMENTATION ONLY (OUTPATIENT)
Dept: SURGERY | Facility: CLINIC | Age: 33
End: 2023-07-10
Payer: COMMERCIAL

## 2023-07-10 ENCOUNTER — TELEPHONE (OUTPATIENT)
Dept: SURGERY | Facility: CLINIC | Age: 33
End: 2023-07-10
Payer: COMMERCIAL

## 2023-07-10 NOTE — CONFIDENTIAL NOTE
Pre-op Class Checklist:    Initial Wt: 328 lbs  AB Visit:    Wt Readings from Last 1 Encounters:   06/22/23 (!) 151.7 kg (334 lb 8 oz)     Hemoglobin A1C   Date Value Ref Range Status   04/04/2023 5.4 0.0 - 5.6 % Final     Comment:     Normal <5.7%   Prediabetes 5.7-6.4%    Diabetes 6.5% or higher     Note: Adopted from ADA consensus guidelines.      CPAP: Yes   Smoking Cessation Date: NA  Urine Nicotine Screen: N/A   Support Group: Yes  - July 2023  Anticoag Risk: Standard   Actigall: Sona hx   Omeprazole: Taking Nexium   Consent: Complete  [facility] [place] [facility] [place] [place] [facility] [address] [place] [address]  Bariatrician: Doris Mccoy MD   Post-op appointment with Levy: No     Tasklist updated.    Brianna Alvarado RN, CBN, [facility]  [address] [place] [address], [address]  [email or web address]  Phone: [phone]  Fax:  [phone]

## 2023-07-10 NOTE — PROGRESS NOTES
Ernestina is scheduled for a LRNY ([id number]) with Dr. Moise Kim on Tuesday, August 22, 2023 @ 11:30 am.  Scheduled for pre op class on Wednesday, August 2, 2023 @ 12:30 pm.  She will have her pre op and testing done at [facility].    [facility]# [id number]

## 2023-07-10 NOTE — TELEPHONE ENCOUNTER
Called [facility] to follow up on the PA I submitted on 06/28/2023.  It is currently still pending.  Patient needed to reenroll in the BRS program as it had been over 90 days.  That was done on 07/03/2023.  I was told by Simi, provider service that they have until 07/13/2023 to make a determination.

## 2023-07-31 RX ORDER — PEDI MULTIVIT NO.25/FOLIC ACID 300 MCG
1 TABLET,CHEWABLE ORAL 2 TIMES DAILY
Qty: 180 TABLET | Refills: 3 | Status: SHIPPED | OUTPATIENT
Start: 2023-07-31 | End: 2023-08-01

## 2023-07-31 NOTE — PATIENT INSTRUCTIONS
Patient name: Ernestina Merlos  Surgery:  Jesse-En-Y Gastric Bypass  Surgery Date:  8/22/2023  Surgery Time: 11:50 am (*This time is just a tentative time and may end up changing.*)  Arrival Time to Hospital: 9:50 am (two hours prior to surgery time)  Surgeon: Moise Kim MD       MONTH BEFORE SURGERY    Schedule and have Pre-operative History and Physical with your primary care in addition to the labs, CXR and EKG.  These should be completed within a month of surgery and no closer than 5 days.  ALL of the following tests must be completed per anesthesia and your surgeon prior to your surgery:    Chest Xray  EKG  Complete Blood Count with Differential  Complete Metabolic Panel  INR  APTT(PTT)  Urine Analysis (UA) with Urine Culture if UA abnormal    * COVID testing- is no longer required prior to your surgery.  However, please let us know if you aren't feeling well or have tested positive for Covid-19 between now and your surgery date. Let us know if you have any questions regarding this.      2 WEEKS BEFORE SURGERY    Start Preoperative Liquid Diet per dietitian instructions      WEEK BEFORE SURGERY CHECKLIST       Continue Full Liquid Diet per dietitian instructions    2.   Purchase diet components for after surgery      3.   Arrange for someone to stay with you the first 1-2 nights you return home.     4.   Arrange for a ride home from the hospital.  We can't give an exact time for  because it depends on how you are doing with your drinking and pain control.  Most people are reaching their goals for discharge by lunch time and you will need to have a ride ready and waiting by 11 am.    5.   Do your grocery/vitamin shopping for before AND after surgery.    6.   Make sure you have a bowel movement if you haven t gone in a while. There is no need for a bowel prep before surgery.       7.   Make sure you have picked up the prescriptions/supplements that were sent to your pharmacy - [facility]     NEW  PRESCRIPTIONS:  In preparation for surgery, we have prescribed some medication that you will need to  as soon as possible     A. Vitamins: Chewable Multivitamin and Vitamin B12 (if you weren't taking already)  - You can start taking the Multivitamin and B12 as soon as you pick this up from the pharmacy.  Some insurance companies will not cover the vitamins because they are available over the counter, so in those cases you will need to purchase them yourselves.  Do not take the morning of surgery.      BEFORE Surgery Medication Considerations:  Certain medications may need to be stopped before major surgery. Some medications may increase your risk of bleeding, others may change the way your blood clots, and other medications can affect your lab work. The bariatric clinic will give you specific instructions about when to stop these medications.    This timeline shows when certain medications should be stopped before surgery: This is a general timeline, if your bariatric nurse or surgeon gives you other instructions, follow those specific instructions.    30 Days (One Month) Before Surgery  Birth control pills & patches that contain estrogen  You must use alternative forms of contraception  Hormone replacement therapy   Premarin  Testosterone  14 Days (Two Weeks) Before Surgery  Appetite control medications   Phentermine  Other: __________  Diuretics ( water pills )   Talk to your primary doctor.  You may need an alternative medication,  Hydrochlorothiazide (HCTZ)  Furosemide (Lasix )  Bumetanide (Bumex )  Spironolactone (Aldactone )  Chlorthalidone  Any blood pressure medication that is combined with a diuretic  Herbal supplements  Fish oil or Omega 3   Homeopathic remedies  7 Days (One Week) Before Surgery  Anti-platelet medications and medications that help to prevent blood clots:  Clopidogrel (Plavix ), Prasugrel (Effient ), Ticagrelor (Brilinta )  Aspirin/Dipyridamole (Aggrenox ), Dipyridamole  (Persantine )  Cilostazol (Pletal )  All Non-Steroidal Anti-Inflammatory Drugs (NSAIDs):   Non-prescription: Ibuprofen (Advil , Motrin , Nuprin ), Naproxen (Naprosyn , Aleve , Anaprox ),   Prescription: Piroxicam (Feldene ), Sulindac (Clinoril ), Tolmentin (Tolectin ), Celecoxib (Celebrex ), Diclofenac (Voltaren ), Indomethacin (Indocin ), Nambumetone (Relafen ), Flurbiprofen (Ansaid ), Ketoprofen (Orudis ), Etodolac (Lodine ), Meloxicam (Mobic ), Oxaprozin (Daypro )  Aspirin (including low-dose (81mg) and chewable  baby aspirin )  Warfarin (Coumadin , Jantoven )  When warfarin is restarted (usually 24-48 hrs after surgery), dosing and INR monitoring will be managed by the Bariatric Clinic for 4-6 weeks after your surgery date.   The Day Before Surgery  Diabetes medications: Follow the instructions on the  Diabetes Management for the Bariatric Surgery Patient  form.  The Day of Surgery  Diabetes medications: Follow the instructions on the  Diabetes Management for the Bariatric Surgery Patient  form.      HOME MEDICATION RECOMMENDATIONS:  Below you will see your specific medication instructions.  Please share this information with your primary care so they can make adjustments to your medications if necessary.    Bupropion XL or SR (Brand Name: Wellbutrin XL or Budeprion SR)  Should not be cut or crushed. Talk with primary care or mental health provider about switching to regular release tablet which may be cut or crushed.  This medication may not be absorbed as well after bariatric surgery (especially the extended release tablet). Watch for changes in symptom control and talk with your provider if you notice any changes in how well this medication is working. Ok to take as scheduled the morning of surgery with a sip of water.    Esomeprazole (Brand Name: Nexium)   Continue after surgery for at least 3 months even if no symptoms of reflux. Entire capsule contents may be sprinkled on a spoonful of applesauce or soft  food and taken immediately without chewing.  If only on a full liquid diet, dump capsule contents into a spoonful of liquid and take without chewing.  May swallow whole again starting 6 weeks after surgery.    Magnesium Citrate  Stop taking 2 weeks before surgery.  May use if ok with bariatric dietitian.    Trazodone( Brand Name: Desyrell)  Ok to cut/crush.     Vitamin D3 5000 IU   Ok to cut or crush tablet; if a softgel will likely need to swallow whole if small enough.  If capsule is too large, may need to take tablet form until able to swallow larger pills again.  Do not take the morning of surgery and don't restart again until instructed by the dietitian.      Packing for the Hospital  When packing for the hospital, anticipate staying overnight. Make a checklist so that you don't forget things you really want to have with you.    Bring a folder with your printed patient handouts to keep handy and add discharge paperwork to if you want.  Bring your insurance card.  Bring a current medication list OR update list in [place] (including vitamins, over-the-counter medication, eye drops, inhalers, patches, ointments and herbal products). Include the name, correct dose and the times you take them each day. List all allergies.  If you use a CPAP or BIPAP, bring it with you.  You may also want to bring a water bottle of the water you use in your machine.  Bring a copy of your health care or advanced directive document if you have one.  You may bring your own gown/pajamas and robe if you don't want to wear the hospital-supplied items once IV is disconnected.  Slippers or shoes should have a non-slip sole.  You may bring your own personal care items such as toothbrush, toothpaste, shampoo, denture cleanser, comb, skin care products, deodorant, make-up, and hair dryer.  If you wear a hearing aid, bring a labeled storage container and extra batteries.  If you wear glasses or contacts, bring a labeled case and saline for  "contacts. Do not plan to wear contact lenses the day of surgery. It is best to bring your glasses so that you can wear them in the recovery room. You cannot wear contact lenses during surgery.  Bring loose-fitting clothing to wear home. Bring telephone numbers (including work numbers) of family and friends.  You may want to bring a journal to document your thoughts and feelings.  You may not wear any jewelry on or in any area of your body to the operating room.   Leave money, jewelry, or any valuables at home.        DAY BEFORE SURGERY CHECKLIST      Clear liquid diet until 4 hours prior to your surgery     2.  Nothing to eat or drink 4 hours prior to your surgery time.       3.  Pack a couple preferred protein shakes to have available in the hospital -See approved list of liquids from dietitian      4.  Hibiclens or Exidine shower. Use a fresh, clean towel to dry off. See \"Showering Before Surgery handout\" below.       MORNING OF SURGERY CHECKLIST      Hibiclens or Exidine shower. Use a fresh, clean towel to dry off. See \"Showering Before Surgery handout\" below.     2.   Remove all jewelry and piercings.      3.   Take any medications you have been advised to take the morning of surgery with a sip of water. (See med list above)     4.   Arrive 2 hours prior to your scheduled surgery time.    Showering Before Surgery  [email or web address]     Preparing Your Skin  [email or web address]    HOSPITAL STAY    Park in the [facility] Visitor [facility]ing Lot in [place] and check in at the information desk where they will escort you to the [facility].     You will be allowed to have 2 support people with you in the pre-operative area at the hospital.  After surgery on the recovery unit, you can have up to 4 visitors and they must leave when visitor hours are over.     Meet your surgical team which includes an RN, CRNA, anesthesiologist and your surgeon.    IV will be started for fluid management, " pain medication and possible antibiotics.    Anticoagulant therapy (blood thinner) will be given and continued until you are discharged.    Pneumatic compression stockings will be placed on your legs before surgery to help prevent blood clots. You can also move your feet up and down frequently to aid in circulation.    An incentive spirometer will be used to promote good lung expansion and prevent pneumonia. This must be within arm's reach of you, and you should be doing it ten times every hour while awake.      PAIN MEDICATION IN THE HOSPITAL  Treating pain and discomfort is important to your recovery.  Your surgeon will order pain medication for you in the hospital.  You will also get a take home prescription for pain medication after surgery.  Our goal is not pain free, but an acceptable level of discomfort; you should be able to move without pain limiting your activity    In the Preoperative area, you will receive additional pain treatment   Intrathecal Spinae Block (Duramorph)- which is an ultrasound guided injection for pain medication   Ketorolac (Toradol )  This is an anti-inflammatory medication used to treat swelling and moderate pain  It is used only for 24 hours after surgery to decrease inflammation and pain  Given through your IV  Takes about 30 minutes to take effect  Common side effects: nausea, upset stomach  If your pain is not well controlled with this, you may be given a narcotic pain medication in addition to ketorolac    In Recovery, you will receive additional IV pain medication as needed and then you will be switched over to things you can take by mouth in preparation for going home.   Fentanyl   Hydromorphone (Dilaudid )    Common side effects: sleepiness, dizziness, upset stomach, constipation   Narcotic medications can alter your judgment, coordination and reaction time.  Do NOT drive or do anything that requires clear thinking and coordination until you have been completely off narcotic  medications for at least 24 hours.    Use only to treat moderate to severe pain.  Ultram (Tramadol)  This is a non-narcotic prescription pain medication used for moderate to severe pain if needed.  Immediate release tablets can be cut or crushed.  Tramadol can cause or worsen seizures. Your risk of seizures is higher if you're taking other certain medications. These drugs include other opioid pain drugs or certain medications for depression, other mood disorders, or psychosis.  Tramadol oral tablet may cause drowsiness. You should not drive, use heavy machinery, or perform any dangerous activities until you know how this drug affects you.  Acetaminophen (Tylenol)   This will be giving by IV to start and then by mouth once you are ready  Use for mild pain or discomfort  Available without a prescription and comes in liquid, tablets, capsules and powder.  The adult strength powder packs can be helpful right after surgery and can be found online.  Maximum daily dosage: 3,000mg per 24 hours  Works best as a scheduled dose for the first three days once you get home in addition to the gabapentin as the inflammation goes down  Gabapentin  Used together with the acetaminophen to provide pain relief.  Can be taken every 8 hours as needed  The medication works by calming the nerve cells that transmit pain signals to the brain.  This is best in liquid form right after surgery but can have a unpleasant taste.    * Important Reminder: Do NOT take NSAID or aspirin medications that are available without a prescription (examples: Ibuprofen, Motrin , Advil , Aleve , Naproxen)      OPERATING ROOM    Hover Mat will be used to move you from one surface to another.    [place] Hugger Gowns/Blankets are used to keep you warm.    Urinary catheters are not usually needed.    Surgery takes 45 minutes-3 hours depending on the procedure.    *Remember: How you go to sleep tends to be how you wake up - so pleasant thoughts are important!    RECOVERY  "ROOM    The surgeon will give an update to your family or support people as you are on your way to the recovery room.    You will wake up with a blood pressure cuff, oxygen and pulse monitor (pulse oximeter).    Frequent vital signs.    Pain Scale.      HOSPITAL ROOM    You will stay overnight on Patient Care Unit P2.    You will have a private room.    [facility] has been awarded an \"American College of Surgeons Center of Excellence\" partnered with the American Society for Metabolic and Bariatric Surgery and has a proven track records for quality care and good outcomes.     The staff has specialty training in the care of weight-loss surgery patients.       WHAT TO EXPECT AFTER SURGERY    You will start walking the day of surgery and continue several times a day, especially once you get home.    Drinking and eating will follow plan discussed with the dietitian.    Shower in the hospital.    Incentive Spirometer use and deep breathing/cough    Splinting your incision and wearing the abdominal binder when moving may help with discomfort.    Discharge the day after surgery is expected for laparoscopic procedures after you are seen by your surgeon, nurse practitioner or physician s assistant, anesthesia, and possibly a pharmacist.    A nurse from the clinic will call you within 3 days after discharge.      ONCE YOU ARE HOME CHECKLIST      Continue your liquid intake daily and track     Oral intake:      12-4pm         4pm-8pm         8pm-Midnight         6am-10am       2.   Walks      12-4pm      4pm-8pm      8pm-Midnight      6am-10am       3.   Incentive Spirometer/ Deep Breaths and Coughing      ACTIVITY AFTER SURGERY    Walking several times a day, increasing endurance and energy level over time.    No lifting over 20 lbs. for the first 2 weeks (6 weeks for open procedure) and then let your body be your guide.    Can be the    in terms of chores at home.    Pushing and pulling uses the same core " muscles and those activities may cause pain or discomfort.    Do each exercise 10-15 times, twice a day and increase weight when you can consistently do 12 repetitions of activity.    No swimming, bathing, or hot tub use until incisions are completely healed (scars).    Do not plan to fly or take a road trip within 1 to 3 months after surgery.    See handouts below for exercises that can be done after surgery.    Seated Exercises for Arms and Legs (can be done before or after surgery)  [email or web address]    Exercise Guidelines after Weight Loss Surgery (1st 4-6 weeks)  [email or web address]    Exercises after Weight Loss Surgery (strengthening, when no weight lifting restrictions - after 4-6 weeks)  [email or web address]      MEDICATIONS AFTER SURGERY    Here is a simple graph that might help makes things more clear for when to start certain medications after surgery.  (Zofran, Tylenol, Gabapentin and Hyoscyamine will be sent home with you from the hospital as needed)    Medication Dose When to Start   Vitamin B12  1000 mcg Once a day under the tongue (sublingual), weekly nasal spray, or monthly injections Day after surgery   Chewable Multivitamin with   18 mg Iron  (Must also contain Vitamin A and Zinc)  NO GUMMIES 1 tablet twice daily Day after surgery   Omeprazole  20 mg 1 capsule daily - open and sprinkle on food or swallow with fluid Day after surgery. Take even if no acid reflux symptoms.   Zofran 4 mg  (if ordered) 4mg tablet every 8 hours as needed for nausea As needed after surgery   Tylenol 1000 mg every 6 hours for three days after surgery.  After first three days after surgery, switch to as needed and do not take more than 3000mg daily Once you get home   (you will be sent home from the hospital with this)   Hyoscyamine  (if ordered) 0.125 mg tablet every 4 hours as needed for stomach spasm pain As needed after surgery   Liquid Gabapentin  Must be kept in fridge    250 mg liquid 3 times a day for at least 3 days after surgery Once you get home   (you will be sent home from the hospital with this)   Chewable Calcium Citrate 2 tablets twice daily Three weeks after surgery   Vitamin D - (125 mcg)  5000 units 1 daily Three weeks after surgery        LIFELONG VITAMINS:    First 3 Months after Surgery  Choose chewable, liquid or crushable forms of Multivitamin and Calcium Citrate and then you can switch to tablets you can swallow whole if you would like.    1.)   Sublingual Vitamin B12: Take 6766-8033 mcg 1 time daily    Also available in injectable form monthly.  Discuss with provider if interested.  2.)   Multivitamin with Iron: Take 1 multivitamin 2 times daily  Needs 18 mg of IRON per dose along with Vitamin A and Zinc in them  Generic Children's Chewable multivitamin with iron (Equate, Up & Up brand, etc.)  Centrum   Chewable  Centrum, Women's One-A-Day or Generic (after 3 months)  NO GUMMY Vitamins (these do not contain iron)  3.)   Calcium Citrate with Vitamin D: Take 400-600 mg 2 times daily (Start 3 weeks after surgery)  Bariatric Advantage: Citrate Lozenges (500mg)--2 Daily, or Chewy Bites (250 mg)--4 Daily  www.bariatricadComenta.TV (Wayin)age.DailyBooth  or 1-561.761.9091  Celebrate Calcium: Calcium Plus 500 (500mg)--2 Daily, or Calcet Creamy Bites (500 mg)--2 Daily, or Calcium Citrate Chews (250mg) - 4 Daily   www.celebrateKaraokeSmart.cos.DailyBooth  or 1-537.328.4888  UNJURY Opurity: Calcium Citrate Plus (300mg)--3 Daily  www.WakoopajuInfer  or 1-991.963.7971  Up-Efrain D: Nutrition Direct: Flavorless Calcium Powder (500 mg with 250 IU Vitamin D)  www.amazon.com  or 1-935.984.2819--3 Scoops Daily  Wellesse Liquid Calcium Citrate--1 Tbsp.  (500 mg)--2 Times Daily  [employer]  After 3 months post op:  Citracal Petites (or Generic version) (200mg) - 4 daily  Any grocery store or Pharmacy  4.)   Vitamin D3 : Take 5000 IU Daily (Start 3 weeks after surgery)  This can remain a small gel cap    AFTER  Surgery Medication Considerations:    The 3 main ideas:  Cut or crush all meds for 6 weeks after surgery  Long acting medications are not the best option anymore  Avoid NSAID medications for the rest of your life    1.  Cutting or crushing meds:  Before surgery, tablet size does not matter.  After surgery there will be swelling around your new stomach pouch or sleeve.  Therefore, it is important to limit the size of medications for the first six weeks after surgery.      What this means for you:  For the first six weeks after surgery, you will need to cut or crush tablets that are larger than   inch (about the size of an eraser on a standard pencil)  Some capsules may be opened and the contents sprinkled onto soft food.  Once sprinkled on food, eat immediately being careful not to chew.    You will be given a pill cutter at the hospital  Refer to your medication list. This list will tell you which medications can be cut or crushed, and which capsules can be opened.     * Important Reminder: Discuss ALL of your medications with your primary care provider.    Your pre-op history and physical appointment is a good time to review your current medications.     2.  Long acting medications are not the best option anymore  Many types of bariatric surgery involve removing a portion of the small intestine. Long acting or extended release medications release slowly throughout the entire small intestine. Because your surgery has changed your stomach and/or intestine, you may not get the full effect of the long acting medication.  Short acting medications may work better for you after surgery. Talk with your doctor at your pre-op history and physical appointment if you are taking long acting medications. Your doctor can change prescriptions for long acting medications to short acting.   3.  Avoid NSAIDs for the rest of your life  NSAIDs are Non-Steroidal Anti-Inflammatory Drugs  The NSAID class of medications is used to relieve  minor aches, pains or to treat fever. They are commonly used to treat pain caused by a cold, flu, sore throat, headache, and arthritis.  Some NSAIDs are available over-the-counter while others need a prescription from your doctor.     NSAIDs should be avoided after bariatric surgery because they can cause stomach ulcers, scarring or bleeding.  You will not be able to take any NSAID mediation for the rest of your life after bariatric surgery.  Below is a list of common NSAID medications:    OVER-THE-COUNTER NSAIDs    Ibuprofen  - Brand names Advil   , Motrin  , Nuprin     Naproxen - Brand names Aleve  , Naprosyn  , Anaprox      PRESCRIPTION NSAIDs   Celebrex   (Celecoxib)                                       Orudis   (Ketoprofen)  Mobic   (Meloxicam)                                           Lodine   (Etodolac)  Voltaren  (Diclofenac)                                        Ansaid   (Flurbiprofen)  Relafen   Nabumetone                                       Clinoril   (Sulindac)  Feldene   (Piroxicam)                                         Tolectin   Tolmentin  Indocin   (Indomethacin)                                    Daypro  (Oxaprozin)       After surgery, the only safe non-prescription pain reliever is acetaminophen (Tylenol ).  Acetaminophen is available in 325mg and 500mg tablets.  If acetaminophen does not control your pain, call your primary care provider to discuss other options.    4. Other medications you may have to avoid  Aspirin  Do not use aspirin for headaches, body aches, or muscle aches after bariatric surgery.  This is because aspirin can also cause stomach ulcers.  However, your primary care provider may tell you to take aspirin for certain conditions.  A maximum of 81mg of enteric coated aspirin (ex: Ecotrin ) once daily is usually okay to take if directed to take aspirin by your primary care doctor.   Be sure to take with food or milk.    Alendronate (Fosamax ), risedronate (Actonel )  (risedronate), ibandronate (Boniva ):  These are common osteoporosis medications that can cause erosions or ulcers in the esophagus and stomach.   Remind your primary care provider that you have had bariatric surgery and discuss other medication options.    Diuretics ( water pills )  These medications are used to treat high blood pressure.  They can also reduce swelling and water retention caused by various medical conditions.  Diuretics should not be used for patients who are having weight loss surgery.  Your diuretic will not be restarted immediately after surgery.  This is because it is often difficult to drink enough fluids after surgery to keep up with the fluid loss caused by the diuretic/ water pill .  Call your primary care provider to discuss alternative medications to treat high blood pressure.      5. Medications for chronic conditions  It is likely that the need for some of your medications will change after weight loss surgery.    High Blood Pressure Medications   As you lose weight, your blood pressure may change. Talk with your primary care provider about how to manage your high blood pressure after surgery. You may need the dose of your blood pressure medication(s) adjusted.  Keep track of your blood pressure, and call your primary doctor if you have low blood pressure symptoms, such as: dizziness, faintness, weakness, light-headedness (especially when going from sitting to standing)    Diabetes Medications  As you lose weight, your blood sugars may change. Talk with your primary care provider about how to monitor and manage your diabetes after surgery. You may need the dose of your diabetes medication(s) adjusted.  Call your primary doctor if you have any of these symptoms.  Keep track of your blood sugars, and call your primary doctor if you have low blood sugar symptoms, such as: sweating, shaking, nervousness, headache, light-headedness, dizziness, weakness, or fainting      6. Other  information    Medication Absorption  Some medications may not be absorbed as well after bariatric surgery.  Watch for changes in symptoms.  It is important to discuss your medications with your doctor if you think your medications are not working as well as they were before.  You may need to have medication doses adjusted.    Ursodiol (Actigall )  With rapid weight loss, your risk of gallstones increases.  If your still have your gallbladder after surgery, you will be given a prescription for Actigall  that you should begin taking 2 weeks after surgery.  Actigall  will help prevent gallstones from forming.  If you are prescribed this medication, you will usually take it for six months. We encourage you to take this tablet or capsule whole with warm or hot liquid to help it dissolve before it reaches your stomach pouch.  This is medication is bigger than the   inch size restriction, but we will not have you cut/crush it due to it's unpleasant metallic taste. A pharmacist will speak with you more about this medication if it is ordered after surgery.    Common Side Effects: constipation, diarrhea, upset stomach, nausea, dizziness    Non-prescription medications:  You may need medication for seasonal allergies, colds, headaches, or minor aches and pains.  It is important to read the package label carefully.  Below are some helpful hints for choosing the right medication:   Look at the active ingredient(s) list to be sure the medication does not contain caffeine, NSAIDs or aspirin (maximum aspirin dose: 81 mg daily).  Avoid long-acting medication options.  Immediate release medications may work better because they are absorbed better.  It is okay to use liquid medications with the following cautions:  Watch the sugar concentration.  High sugar content in medications could cause dumping syndrome.  Diluting the liquid medication with an equal amount of water will help prevent dumping syndrome.  Do not use products that  contain high fructose corn syrup, corn syrup, sugar, or sorbitol.  Look for sugar-free products as an alternative.  Chewable tablets or tablets that dissolve on your tongue ( oral-dissolving tablet ) are generally safe to use.      Supplements  Refer to the Vitamins & Supplements Handout provided to you by the Bariatric Dietitian for recommended doses and products.       After Bariatric Surgery Discharge Instructions  [facility]     Note: Ask your nurse to order your medications from the pharmacy. Be sure you have your medications with you when you leave.    What should my diet be for the first 2 weeks after surgery?  Follow the bariatric diet the dietitian discussed with you.    How much fluid should I drink?  Strive for 48-64 ounces daily.  Carry a water bottle with you without a straw or sports top. Drink from it often.  Keep track of how much fluid you drink in a day.  Remember:  -Do not use straws, chew gum or suck on hard candies. They may cause painful gas.    -Sip, don't slurp when you drink.    -Practice small sips using a medicine cup for the first week postop.   -No ice or cold drinks. This could cause gas or spasms.   -No coffee, soda pop or drinks with caffeine. These may cause stomach pain.   -No alcohol. It is bad for your liver and will cause stomach pain.    How often should I do my deep breathing and coughing?  Use your incentive spirometer (small plastic breathing device) every hour while awake after you get home. Using the incentive spirometer helps you deep breath. Continuing to cough and deep breath will help prevent fevers and pneumonia.   If you do not have a fever after one week, you can stop using the incentive spirometer and discard it.     You can continue to take deep breaths without the incentive spirometer every one to two hours while awake for the month after surgery.    What kinds of activity can I do?  Get plenty of rest the first few days  after surgery and try to balance rest and activity. You will need some time to recover - you may be more tired than you realize at first. You'll feel better and heal faster if you take good care of yourself.  For 2 weeks after surgery (Please review restrictions at your two week visit, they could change based on how well you are doing):   -Don't lift more than 20 pounds.   -Take 4-5 short walks every day.  -Don't jog, run, or do belly exercises.  Don't swim, bathe or use a hot tub until your cuts are healed (scabs are gone).    You may shower right away after surgery.  Don't plan to fly or take a road trip within the first 1 to 3 months after surgery.  You could get a blood clot in your legs. If you must travel, get up and move around every hour for at least 5 minutes before continuing on your journey.  Your care team can help you decide when it's safe for you to travel.     What can I do for pain control?  You had major belly surgery that involves all layers of your belly muscles. Pain is expected, even for some as far out as 6-8 weeks after surgery. Moving, sneezing, coughing, and breathing will cause discomfort because these activities use your belly muscles.   Please see your after visit summary medication review for what pain medication will be continued, discontinued and newly started for you.    You can take opioid pain medicine if prescribed and if needed. Try to wean off from it as soon as you feel comfortable.   Do not drive while you are taking opioid pain medicine. This is dangerous.  The first three days after surgery, take your acetaminophen (Tylenol) scheduled every 6 hours.  After that you can take it as needed.  -Acetaminophen formulation options:   -Liquid   -Caplet (Cut caplet in half before taking)   -We will have you on a higher dose of Tylenol for the first three days post-op but then but then you should not exceed 3000mg per day.  -You will also take Tylenol for pain in place of the opioid  pain medicine (check with your care team for specifics).   You can apply ice or heat to the affected area(s). Just remember to wrap the ice in something and limit icing sessions to 20 minutes. Excessive icing can irritate the skin or cause skin damage.  You can apply heat with a hot, wet towel or heating pad. Just like cold therapy, limit heat application to 20 minutes. Never sleep with a heating pad on. It could cause severe burns to your skin.  Wear your binder to support your belly muscles if you have one.  Take this off a little more each day and try to be off completely by 2 weeks after surgery. If you don't need your binder for comfort or support, you don't need to wear it.   You may not be able to sleep in a comfortable position for a few weeks after surgery. This is normal. You may be more comfortable sleeping in a recliner or propped up with 3 pillows for the first couple of weeks after surgery.  You may feel gas pains in the upper chest or between your shoulder blades from the laparoscopic surgery which should get better with walking around, warm/cold packs and pain medication.  Do not take NSAID's (Non-Steroidal Anti-Inflammatory Drugs) (examples: ibuprofen, Motrin, Advil, Aleve, and Naproxen), aspirin, or use pain patches with NSAID's. They will increase your risk of bleeding or getting an ulcer.    Please call the clinic for any of the following pain concerns, we would like to talk to you:  -pain that does not improve with rest  -pain that gets worse and worse  -pain that is not controlled by your pain medicine  -a sudden severe increase in pain    What medications will I need to take after surgery?  You may be discharged with the following types of medications: antacids, pain medications, anti-nausea medications, etc.  Please see your after visit summary medication review for what will be continued, discontinued and newly started.  It is important to reduce the amount of acid in your new stomach for a  couple of months after surgery while it is still healing. We will prescribe an acid reducer or antacid. Take it as directed. This will help prevent ulcers, heartburn and acid reflux.  If you took an acid reducer before you had surgery, your care team will let you know what acid reducer you will take after surgery.   It is okay to swallow any medications smaller than   inch in size.   -If it is larger than   inch, it may need to be cut, crushed, or in a liquid form. See the above medication section for what is most appropriate for you and your medications.    What should I know about my incisions (cuts)?  Your incisions are covered with white steri strips or butterfly tape and have band aids or gauze over the top. If you have gauze or band aids, they can come off in the hospital.  Leave your steri strips on until they fall off on their own. If the steri strips don't fall off after 1 week, you can take them off. If they fall off earlier, replace them with clean band aids trying to avoid touching the incision itself.   If you have gauze covered by a clear dressing, remove 2-3 days after surgery or as directed by your care team.  You may shower in the hospital after surgery and can get your incision coverings wet.  Do not submerge in water (e.g. No baths, swimming pools, hot tubs) until your incisions are completely healed (scabs are gone).    Call the clinic if you have any of these signs or symptoms of infection:  -Redness around the site.  -Drainage that smells bad.  -Drainage that is thick yellow or green.  -An increase in pain around the incision site  -An increase in swelling around the incision site  -Heat or warmth around incision site   -Fever of 101.5  F (38.3  C) or higher when taken under the tongue.    -Chills    Will my urine or bowel movements change?   Your first bowel movements (stools) will likely be liquid. You may also notice old blood or a darker color (black or maroon color) in your bowel  movements.  This is not unusual and usually goes away after the first week, if not sooner. You may not have a bowel movement for a week.   If you have not had a bowel movement for at least three days after your surgery date and are passing gas, you can use over the counter stool softeners.  Please stop taking the stool softeners and laxatives if your stools are loose.  Increasing fluids and activity as well as getting off narcotics will help prevent constipation.    Call the clinic if:   -You have stomach pain.   -You continue to have constipation.  -You have excessive bloating after walking and passing gas.  -You are having 3 or more loose stools a day.  You may have an infection that we need to treat.    How can I prevent dehydration if I feel nauseated (sick to my stomach) and vomit (throw up)?   Vomiting is not normal after surgery. If you continue to have nausea and vomiting, call the clinic.   Nausea can be a sign of dehydration. That is why it is very important to track your fluids. Do not nap more than one hour during the day. Set a timer to wake yourself up, if needed. Too much sleep will keep you from drinking enough fluid during the day and lead to dehydration.  No outside activity in hot, humid weather until you can drink 48 to 64 ounces of fluid in 24 hours. If you sweat a lot, your body may lose too much water.  If having difficulty getting in your fluids, try to take a 1 ounce sip of water (one medicine cup) every 15 minutes.  Set a timer to remind yourself.    If you notice any of the following symptoms, please don't delay in calling the clinic.  Early identification gives us more options for treatment:  -Dark colored urine  -Urinating (pass water) less than 2-3 times per day  -Lack of energy  -Nausea  -Dizziness  -Headache  -Metallic taste in your mouth    Call the clinic ANY TIME at [phone] if:  -Your pain medicine is not working.  -You have a fever ? 101.5 F.  -You have belly or left shoulder  "pain that gets worse and worse.  -You have a swollen leg with redness, warmth, or pain behind the knee or calf.  -You have chest pain   -You feel very short of breath.  -You have a sudden severe increase in heart rate.  -You have vomiting that gets worse and worse.  -You have constant nausea (feeling sick to your stomach) that does not go away with medication.  -You have trouble swallowing.  -You have an increasing feeling that \"something is not right\".  -You have hiccups that do not stop.  -You have any questions or concerns.    If you have to go to the Emergency Room, we prefer you go to the hospital that did your surgery. Please let them know that you had bariatric surgery and to notify your surgeon.    When should I go back to the clinic?  Follow up with your care team in 1 week.   If this appointment was not already made, please call: [phone]    These are some additional handouts that are very important to read through and use after surgery.  They are good tools for after your surgery as you recover.      After Your Weight Loss Surgery  [email or web address]      Mindfulness Meditation  [email or web address]    Conscious Breathing  [email or web address]    Keeping Track of Your Fluids (for after surgery)  [email or web address]      AFTER SURGERY APPOINTMENT SCHEDULE    1 week with Dietitian (SIRI)  Dietitian Virtual Group Class scheduled for Tuesday 8/29/2023 from 1-2 pm with one of the dietitians. She will send you an email invitation to join the week of this class and the purpose of it is to check in with you and give you the next set of dietary instructions.  It will be using [employer] Teams, NOT in person or through [employer].    2 weeks with Surgeon  Surgeon video follow-up visit that will be done through [employer] which is already scheduled for you.    1 month with RD    3 months with RD    6 months with Bariatrician with labs    9 months with RD     12 " months with Bariatrician with labs    18 months with RD or Bariatrician-possible labs    Annually after that with Bariatrician with labs and RD if needed      POP QUIZ    1.  How long do you need to be on full liquids after surgery? ________    2.  Name the 4 vitamin/mineral supplements you ll need to take for the rest of your life.    __________  _________  _________  __________    3.  How many grams of protein should you get in a day? ________    4.   Which meal would give you the most protein?    a.   1 oz. Cheese on 1 saltine cracker and 3 Tbsp applesauce.    b.     cup mashed potatoes and gravy with 2 Tablespoons applesauce    c.   3 Reduced Fat Wheat Thins, 1 oz. green beans, and 2 peeled grapes     5.   Concentrated urine, lack of energy, nausea, dizziness, headache, and a bad taste in your mouth are signs of:    a. Dumping    b.  Dehydration   c. Clogging    d. None of the above    6.     What is the minimum amount of time recommended for exercise?    a.  30 minutes 7 days a week    b.  30 minutes 3 days per week    c.   1 hour 5 days per week    d.  None of the above    7.     Drinking liquids with meals can cause:    a.  Vomiting     b.   Weight gain   c.   Desire to Snack    d.   All of the above    8.     The long-term success of my weight loss surgery depends on:    a.      Me      b.   My Surgeon     c.  My Support Group     d.  My Family    9.      If you receive ice, carbonation or straws in the hospital post-op, you should:    a.      Eat and drink whatever is given to you by the hospital staff    b.      Remind the hospital staff you are not to have ice, straws or carbonation.    c.      Take the ice but not the carbonation or straws    d.      Call 911    10.  Name two possible complications after bariatric surgery:    ________________________   ______________________    11. Name two habits of healthy bariatric surgery patients:    _______________________  ________________________    True or  False    12.  This operation for obesity will require routine visits with my surgeon for the first year, and then I will be okay on my own once I lose my weight and change my diet.    13.  Obesity is a disease that can be managed with a variety of tools.  However, some individuals fail to lose weight or regain their weight because they resume snacking, binging, take in high fat or carbohydrate food & lack sufficient liquids and exercise.    14.  Women should avoid becoming pregnant for at least 18 months to 2 years following bariatric surgery.    15.  I can still drink 2-3 cans of soda or carbonated juices, water or other beverages after my surgery, as long as it is in moderation.    16.   Re-operation is sometimes necessary due to bleeding, hernias, ulceration, breakdown of stitches or staples, leakage and blockage of the intestines.    17.   I should call the clinic if I develop increased redness or swelling in or around my incision, an oral temperature of 101.5 or greater, increasing severe abdominal or shoulder pain, increasing heart rate or anytime I just don t feel right.    18.   Gaining weight prior to surgery is dangerous because it can enlarge the liver, increase surgical risk and extend your recovery period.    19.   Once I lose my weight, I can drink alcohol as long as it is in moderation.    20.  It is possible I will have more emotional difficulties after surgery.    21.  I can take Aleve but not Ibuprofen or Aspirin after surgery.    22.  If I eat yogurt and drink milk daily, I don t have to take the recommended dose of calcium supplements    23.  Dumping Syndrome only occurs in gastric sleeve patients.    24.  It is possible you will gain weight or not lose much weight immediately after surgery.       We wish you the best and please let us know if you have any questions or concerns!    Brianna Alvarado RN and Katina Schaffer RN    [facility]  [address] [place] [address]  [address]  Phone: [phone]  Fax:  [phone]

## 2023-07-31 NOTE — PROGRESS NOTES
Patient attended group class to review pre-op instructions and dietary plan for upcoming surgery.     Discussed admission process, pre-op testing, and hospital course.  Medication recommendations and information about them before and after surgery given and explained.  Patient was given exercises to work on post-op for maintaining muscle mass and strengthening that was created by [employer].  Bariatric quiz reviewed together to assess understanding.  Discharge instructions and follow up appointments given and reviewed with pt at this time and patient verbalized understanding. She will have her H&P at [facility] on 8/7/2023 and do her  pre-op testing during that visit. Prescriptions for vitamins sent to the patient's pharmacy to be started after surgery.      Brianna Alvarado RN, CBN

## 2023-08-01 RX ORDER — PEDI MULTIVIT NO.25/FOLIC ACID 300 MCG
1 TABLET,CHEWABLE ORAL 2 TIMES DAILY
Qty: 180 TABLET | Refills: 3 | Status: SHIPPED | OUTPATIENT
Start: 2023-08-01

## 2023-08-02 ENCOUNTER — ALLIED HEALTH/NURSE VISIT (OUTPATIENT)
Dept: SURGERY | Facility: CLINIC | Age: 33
End: 2023-08-02
Payer: COMMERCIAL

## 2023-08-02 VITALS — BODY MASS INDEX: 53.59 KG/M2 | WEIGHT: 293 LBS

## 2023-08-02 DIAGNOSIS — Z98.84 HISTORY OF ROUX-EN-Y GASTRIC BYPASS: ICD-10-CM

## 2023-08-02 DIAGNOSIS — Z01.818 PRE-OPERATIVE CLEARANCE: Primary | ICD-10-CM

## 2023-08-02 DIAGNOSIS — K90.9 INTESTINAL MALABSORPTION: ICD-10-CM

## 2023-08-02 PROCEDURE — 99207 PR PREOP VISIT IN GLOBAL PKG: CPT | Mod: 25

## 2023-08-02 NOTE — PROGRESS NOTES
Pt attended the pre-surgery class for bariatric surgery class and was educated on the pre and post surgery liquid diets. Patient received information via [employer] for the pre-op liquid diet and the post-op liquid diet. Discussed appropriate liquids and discussed portions. Reviewed appropriate calories, protein, and fluid goals for each stage before and after surgery. Educated on correct vitamins/minerals, dosage, and frequency to take after surgery. Provided grocery list and sample menu plan for each diet stage.      Pt will begin pre-op liquid diet 8/8/2023 and will do clear liquids the day before surgery. Pt is scheduled for RYGB on 8/22/2023 and will then follow 1 week post-op liquid diet. Pt will follow up with RD 1-week post-op for education on the pureed and soft/regular diet stages.    Charissa Hallman RD

## 2023-08-04 ENCOUNTER — TELEPHONE (OUTPATIENT)
Dept: SURGERY | Facility: CLINIC | Age: 33
End: 2023-08-04
Payer: COMMERCIAL

## 2023-08-04 NOTE — TELEPHONE ENCOUNTER
Katina millan/Paul calling regarding patients PTT lab order. They are not able to perform that specific lab at the clinic, they can complete all other labs & EKG. Patient had pre-op done with Deann Luciano on 7/31 per caller.    [phone] Katina

## 2023-08-04 NOTE — TELEPHONE ENCOUNTER
Pt will have her labs drawn at [facility] instead of [facility] since they can't do all of them. She is having her H&P at [facility] in [place] on 8/7/2023. I let Katina at [facility] know that the orders are in the system and pt can do them at any [facility] facility. She will let the pt know.    Katina Schaffer RN, CBN  [facility]  P [phone]  F [phone]

## 2023-08-05 ENCOUNTER — HEALTH MAINTENANCE LETTER (OUTPATIENT)
Age: 33
End: 2023-08-05

## 2023-08-07 ENCOUNTER — LAB REQUISITION (OUTPATIENT)
Dept: LAB | Facility: CLINIC | Age: 33
End: 2023-08-07
Payer: COMMERCIAL

## 2023-08-07 ENCOUNTER — LAB (OUTPATIENT)
Dept: LAB | Facility: HOSPITAL | Age: 33
End: 2023-08-07
Payer: COMMERCIAL

## 2023-08-07 DIAGNOSIS — Z01.818 ENCOUNTER FOR OTHER PREPROCEDURAL EXAMINATION: ICD-10-CM

## 2023-08-07 DIAGNOSIS — Z01.818 PRE-OPERATIVE CLEARANCE: ICD-10-CM

## 2023-08-07 LAB
ALBUMIN SERPL BCG-MCNC: 4.4 G/DL (ref 3.5–5.2)
ALP SERPL-CCNC: 67 U/L (ref 35–104)
ALT SERPL W P-5'-P-CCNC: 42 U/L (ref 0–50)
ANION GAP SERPL CALCULATED.3IONS-SCNC: 8 MMOL/L (ref 7–15)
APTT PPP: 25 SECONDS (ref 22–38)
AST SERPL W P-5'-P-CCNC: 29 U/L (ref 0–45)
BASOPHILS # BLD AUTO: 0.1 10E3/UL (ref 0–0.2)
BASOPHILS NFR BLD AUTO: 1 %
BILIRUB SERPL-MCNC: 0.3 MG/DL
BUN SERPL-MCNC: 15 MG/DL (ref 6–20)
CALCIUM SERPL-MCNC: 9.6 MG/DL (ref 8.6–10)
CHLORIDE SERPL-SCNC: 107 MMOL/L (ref 98–107)
CREAT SERPL-MCNC: 0.83 MG/DL (ref 0.51–0.95)
DEPRECATED HCO3 PLAS-SCNC: 24 MMOL/L (ref 22–29)
EOSINOPHIL # BLD AUTO: 0.2 10E3/UL (ref 0–0.7)
EOSINOPHIL NFR BLD AUTO: 2 %
ERYTHROCYTE [DISTWIDTH] IN BLOOD BY AUTOMATED COUNT: 12.4 % (ref 10–15)
GFR SERPL CREATININE-BSD FRML MDRD: >90 ML/MIN/1.73M2
GLUCOSE SERPL-MCNC: 111 MG/DL (ref 70–99)
HCT VFR BLD AUTO: 41.8 % (ref 35–47)
HGB BLD-MCNC: 13.9 G/DL (ref 11.7–15.7)
IMM GRANULOCYTES # BLD: 0 10E3/UL
IMM GRANULOCYTES NFR BLD: 0 %
INR PPP: 0.96 (ref 0.85–1.15)
LYMPHOCYTES # BLD AUTO: 2.4 10E3/UL (ref 0.8–5.3)
LYMPHOCYTES NFR BLD AUTO: 29 %
MCH RBC QN AUTO: 32 PG (ref 26.5–33)
MCHC RBC AUTO-ENTMCNC: 33.3 G/DL (ref 31.5–36.5)
MCV RBC AUTO: 96 FL (ref 78–100)
MONOCYTES # BLD AUTO: 0.5 10E3/UL (ref 0–1.3)
MONOCYTES NFR BLD AUTO: 6 %
NEUTROPHILS # BLD AUTO: 5.2 10E3/UL (ref 1.6–8.3)
NEUTROPHILS NFR BLD AUTO: 62 %
NRBC # BLD AUTO: 0 10E3/UL
NRBC BLD AUTO-RTO: 0 /100
PLATELET # BLD AUTO: 254 10E3/UL (ref 150–450)
POTASSIUM SERPL-SCNC: 4.3 MMOL/L (ref 3.4–5.3)
PROT SERPL-MCNC: 7.2 G/DL (ref 6.4–8.3)
RBC # BLD AUTO: 4.35 10E6/UL (ref 3.8–5.2)
SODIUM SERPL-SCNC: 139 MMOL/L (ref 136–145)
WBC # BLD AUTO: 8.4 10E3/UL (ref 4–11)

## 2023-08-07 PROCEDURE — 87086 URINE CULTURE/COLONY COUNT: CPT | Mod: ORL | Performed by: PHYSICIAN ASSISTANT

## 2023-08-07 PROCEDURE — 85730 THROMBOPLASTIN TIME PARTIAL: CPT

## 2023-08-07 PROCEDURE — 80053 COMPREHEN METABOLIC PANEL: CPT

## 2023-08-07 PROCEDURE — 85025 COMPLETE CBC W/AUTO DIFF WBC: CPT

## 2023-08-07 PROCEDURE — 36415 COLL VENOUS BLD VENIPUNCTURE: CPT

## 2023-08-07 PROCEDURE — 85610 PROTHROMBIN TIME: CPT

## 2023-08-09 LAB — BACTERIA UR CULT: NO GROWTH

## 2023-08-22 ENCOUNTER — ANESTHESIA EVENT (OUTPATIENT)
Dept: SURGERY | Facility: HOSPITAL | Age: 33
DRG: 621 | End: 2023-08-22
Payer: COMMERCIAL

## 2023-08-22 ENCOUNTER — HOSPITAL ENCOUNTER (INPATIENT)
Facility: HOSPITAL | Age: 33
LOS: 1 days | Discharge: HOME | DRG: 621 | End: 2023-08-23
Attending: SURGERY | Admitting: SURGERY
Payer: COMMERCIAL

## 2023-08-22 ENCOUNTER — DOCUMENTATION ONLY (OUTPATIENT)
Dept: OTHER | Facility: CLINIC | Age: 33
End: 2023-08-22
Payer: COMMERCIAL

## 2023-08-22 ENCOUNTER — ANESTHESIA (OUTPATIENT)
Dept: SURGERY | Facility: HOSPITAL | Age: 33
DRG: 621 | End: 2023-08-22
Payer: COMMERCIAL

## 2023-08-22 DIAGNOSIS — Z98.84 S/P GASTRIC BYPASS: ICD-10-CM

## 2023-08-22 DIAGNOSIS — E66.01 MORBID OBESITY (H): ICD-10-CM

## 2023-08-22 DIAGNOSIS — E66.01 MORBID (SEVERE) OBESITY DUE TO EXCESS CALORIES (H): Primary | ICD-10-CM

## 2023-08-22 LAB — GLUCOSE BLDC GLUCOMTR-MCNC: 88 MG/DL (ref 70–99)

## 2023-08-22 PROCEDURE — 250N000009 HC RX 250: Performed by: NURSE ANESTHETIST, CERTIFIED REGISTERED

## 2023-08-22 PROCEDURE — 43644 LAP GASTRIC BYPASS/ROUX-EN-Y: CPT | Performed by: SURGERY

## 2023-08-22 PROCEDURE — 250N000011 HC RX IP 250 OP 636: Performed by: SURGERY

## 2023-08-22 PROCEDURE — 250N000011 HC RX IP 250 OP 636: Mod: JZ | Performed by: ANESTHESIOLOGY

## 2023-08-22 PROCEDURE — 710N000009 HC RECOVERY PHASE 1, LEVEL 1, PER MIN: Performed by: SURGERY

## 2023-08-22 PROCEDURE — 370N000017 HC ANESTHESIA TECHNICAL FEE, PER MIN: Performed by: SURGERY

## 2023-08-22 PROCEDURE — 250N000009 HC RX 250: Performed by: ANESTHESIOLOGY

## 2023-08-22 PROCEDURE — 258N000003 HC RX IP 258 OP 636: Performed by: ANESTHESIOLOGY

## 2023-08-22 PROCEDURE — 258N000003 HC RX IP 258 OP 636: Performed by: NURSE ANESTHETIST, CERTIFIED REGISTERED

## 2023-08-22 PROCEDURE — 120N000001 HC R&B MED SURG/OB

## 2023-08-22 PROCEDURE — 250N000011 HC RX IP 250 OP 636: Performed by: NURSE PRACTITIONER

## 2023-08-22 PROCEDURE — 250N000011 HC RX IP 250 OP 636: Mod: JZ | Performed by: NURSE ANESTHETIST, CERTIFIED REGISTERED

## 2023-08-22 PROCEDURE — 250N000013 HC RX MED GY IP 250 OP 250 PS 637: Performed by: NURSE PRACTITIONER

## 2023-08-22 PROCEDURE — 0D164ZA BYPASS STOMACH TO JEJUNUM, PERCUTANEOUS ENDOSCOPIC APPROACH: ICD-10-PCS | Performed by: SURGERY

## 2023-08-22 PROCEDURE — 999N000141 HC STATISTIC PRE-PROCEDURE NURSING ASSESSMENT: Performed by: SURGERY

## 2023-08-22 PROCEDURE — 43644 LAP GASTRIC BYPASS/ROUX-EN-Y: CPT | Mod: AS | Performed by: NURSE PRACTITIONER

## 2023-08-22 PROCEDURE — 360N000077 HC SURGERY LEVEL 4, PER MIN: Performed by: SURGERY

## 2023-08-22 PROCEDURE — 250N000013 HC RX MED GY IP 250 OP 250 PS 637: Performed by: SURGERY

## 2023-08-22 PROCEDURE — 999N000157 HC STATISTIC RCP TIME EA 10 MIN

## 2023-08-22 PROCEDURE — 258N000003 HC RX IP 258 OP 636: Performed by: NURSE PRACTITIONER

## 2023-08-22 PROCEDURE — 250N000011 HC RX IP 250 OP 636: Mod: JZ | Performed by: SURGERY

## 2023-08-22 PROCEDURE — 272N000001 HC OR GENERAL SUPPLY STERILE: Performed by: SURGERY

## 2023-08-22 RX ORDER — FENTANYL CITRATE 50 UG/ML
50 INJECTION, SOLUTION INTRAMUSCULAR; INTRAVENOUS EVERY 5 MIN PRN
Status: DISCONTINUED | OUTPATIENT
Start: 2023-08-22 | End: 2023-08-22 | Stop reason: HOSPADM

## 2023-08-22 RX ORDER — FENTANYL CITRATE 50 UG/ML
25 INJECTION, SOLUTION INTRAMUSCULAR; INTRAVENOUS EVERY 5 MIN PRN
Status: DISCONTINUED | OUTPATIENT
Start: 2023-08-22 | End: 2023-08-22 | Stop reason: HOSPADM

## 2023-08-22 RX ORDER — PROPOFOL 10 MG/ML
INJECTION, EMULSION INTRAVENOUS CONTINUOUS PRN
Status: DISCONTINUED | OUTPATIENT
Start: 2023-08-22 | End: 2023-08-22

## 2023-08-22 RX ORDER — CELECOXIB 200 MG/1
400 CAPSULE ORAL
Status: DISCONTINUED | OUTPATIENT
Start: 2023-08-22 | End: 2023-08-22

## 2023-08-22 RX ORDER — DEXMEDETOMIDINE HYDROCHLORIDE 4 UG/ML
.2-1 INJECTION, SOLUTION INTRAVENOUS CONTINUOUS
Status: DISCONTINUED | OUTPATIENT
Start: 2023-08-22 | End: 2023-08-22 | Stop reason: HOSPADM

## 2023-08-22 RX ORDER — PROCHLORPERAZINE MALEATE 10 MG
10 TABLET ORAL EVERY 6 HOURS PRN
Status: DISCONTINUED | OUTPATIENT
Start: 2023-08-22 | End: 2023-08-23 | Stop reason: HOSPADM

## 2023-08-22 RX ORDER — SODIUM CHLORIDE, SODIUM LACTATE, POTASSIUM CHLORIDE, CALCIUM CHLORIDE 600; 310; 30; 20 MG/100ML; MG/100ML; MG/100ML; MG/100ML
INJECTION, SOLUTION INTRAVENOUS CONTINUOUS
Status: DISCONTINUED | OUTPATIENT
Start: 2023-08-22 | End: 2023-08-22 | Stop reason: HOSPADM

## 2023-08-22 RX ORDER — HYDROMORPHONE HYDROCHLORIDE 1 MG/ML
0.2 INJECTION, SOLUTION INTRAMUSCULAR; INTRAVENOUS; SUBCUTANEOUS EVERY 5 MIN PRN
Status: DISCONTINUED | OUTPATIENT
Start: 2023-08-22 | End: 2023-08-22 | Stop reason: HOSPADM

## 2023-08-22 RX ORDER — ONDANSETRON 2 MG/ML
4 INJECTION INTRAMUSCULAR; INTRAVENOUS EVERY 30 MIN PRN
Status: DISCONTINUED | OUTPATIENT
Start: 2023-08-22 | End: 2023-08-22 | Stop reason: HOSPADM

## 2023-08-22 RX ORDER — ENOXAPARIN SODIUM 100 MG/ML
40 INJECTION SUBCUTANEOUS EVERY 24 HOURS
Status: DISCONTINUED | OUTPATIENT
Start: 2023-08-22 | End: 2023-08-23 | Stop reason: HOSPADM

## 2023-08-22 RX ORDER — TRAMADOL HYDROCHLORIDE 50 MG/1
100 TABLET ORAL EVERY 6 HOURS PRN
Status: DISCONTINUED | OUTPATIENT
Start: 2023-08-22 | End: 2023-08-23 | Stop reason: HOSPADM

## 2023-08-22 RX ORDER — CEFAZOLIN SODIUM/WATER 3 G/30 ML
3 SYRINGE (ML) INTRAVENOUS
Status: COMPLETED | OUTPATIENT
Start: 2023-08-22 | End: 2023-08-22

## 2023-08-22 RX ORDER — INDOCYANINE GREEN AND WATER 25 MG
KIT INJECTION PRN
Status: DISCONTINUED | OUTPATIENT
Start: 2023-08-22 | End: 2023-08-22

## 2023-08-22 RX ORDER — NALOXONE HYDROCHLORIDE 1 MG/ML
0.2 INJECTION INTRAMUSCULAR; INTRAVENOUS; SUBCUTANEOUS
Status: DISCONTINUED | OUTPATIENT
Start: 2023-08-22 | End: 2023-08-22

## 2023-08-22 RX ORDER — ACETAMINOPHEN 10 MG/ML
1000 INJECTION, SOLUTION INTRAVENOUS EVERY 6 HOURS
Status: DISCONTINUED | OUTPATIENT
Start: 2023-08-22 | End: 2023-08-23 | Stop reason: HOSPADM

## 2023-08-22 RX ORDER — LIDOCAINE 40 MG/G
CREAM TOPICAL
Status: DISCONTINUED | OUTPATIENT
Start: 2023-08-22 | End: 2023-08-22 | Stop reason: HOSPADM

## 2023-08-22 RX ORDER — MAGNESIUM SULFATE 4 G/50ML
4 INJECTION INTRAVENOUS ONCE
Status: COMPLETED | OUTPATIENT
Start: 2023-08-22 | End: 2023-08-22

## 2023-08-22 RX ORDER — BUPIVACAINE HYDROCHLORIDE 2.5 MG/ML
INJECTION, SOLUTION INFILTRATION; PERINEURAL PRN
Status: DISCONTINUED | OUTPATIENT
Start: 2023-08-22 | End: 2023-08-22 | Stop reason: HOSPADM

## 2023-08-22 RX ORDER — GABAPENTIN 250 MG/5ML
250 SOLUTION ORAL EVERY 8 HOURS SCHEDULED
Status: DISCONTINUED | OUTPATIENT
Start: 2023-08-22 | End: 2023-08-23 | Stop reason: HOSPADM

## 2023-08-22 RX ORDER — NALOXONE HYDROCHLORIDE 1 MG/ML
0.4 INJECTION INTRAMUSCULAR; INTRAVENOUS; SUBCUTANEOUS
Status: DISCONTINUED | OUTPATIENT
Start: 2023-08-22 | End: 2023-08-22

## 2023-08-22 RX ORDER — ACETAMINOPHEN 325 MG/10.15ML
975 LIQUID ORAL EVERY 6 HOURS
Status: DISCONTINUED | OUTPATIENT
Start: 2023-08-22 | End: 2023-08-23 | Stop reason: HOSPADM

## 2023-08-22 RX ORDER — ONDANSETRON 2 MG/ML
4 INJECTION INTRAMUSCULAR; INTRAVENOUS EVERY 6 HOURS PRN
Status: DISCONTINUED | OUTPATIENT
Start: 2023-08-22 | End: 2023-08-23 | Stop reason: HOSPADM

## 2023-08-22 RX ORDER — NALBUPHINE HYDROCHLORIDE 20 MG/ML
5 INJECTION, SOLUTION INTRAMUSCULAR; INTRAVENOUS; SUBCUTANEOUS EVERY 4 HOURS PRN
Status: DISCONTINUED | OUTPATIENT
Start: 2023-08-22 | End: 2023-08-23 | Stop reason: HOSPADM

## 2023-08-22 RX ORDER — ACETAMINOPHEN 325 MG/1
975 TABLET ORAL ONCE
Status: COMPLETED | OUTPATIENT
Start: 2023-08-22 | End: 2023-08-22

## 2023-08-22 RX ORDER — GABAPENTIN 300 MG/1
600 CAPSULE ORAL
Status: COMPLETED | OUTPATIENT
Start: 2023-08-22 | End: 2023-08-22

## 2023-08-22 RX ORDER — NALOXONE HYDROCHLORIDE 0.4 MG/ML
0.4 INJECTION, SOLUTION INTRAMUSCULAR; INTRAVENOUS; SUBCUTANEOUS
Status: DISCONTINUED | OUTPATIENT
Start: 2023-08-22 | End: 2023-08-23 | Stop reason: HOSPADM

## 2023-08-22 RX ORDER — SCOLOPAMINE TRANSDERMAL SYSTEM 1 MG/1
1 PATCH, EXTENDED RELEASE TRANSDERMAL ONCE
Status: COMPLETED | OUTPATIENT
Start: 2023-08-22 | End: 2023-08-23

## 2023-08-22 RX ORDER — LORAZEPAM 2 MG/ML
.5-1 INJECTION INTRAMUSCULAR EVERY 6 HOURS PRN
Status: DISCONTINUED | OUTPATIENT
Start: 2023-08-22 | End: 2023-08-23 | Stop reason: HOSPADM

## 2023-08-22 RX ORDER — DEXTROSE MONOHYDRATE, SODIUM CHLORIDE, AND POTASSIUM CHLORIDE 50; 1.49; 4.5 G/1000ML; G/1000ML; G/1000ML
INJECTION, SOLUTION INTRAVENOUS CONTINUOUS
Status: DISCONTINUED | OUTPATIENT
Start: 2023-08-22 | End: 2023-08-23 | Stop reason: HOSPADM

## 2023-08-22 RX ORDER — NALBUPHINE HYDROCHLORIDE 20 MG/ML
2.5-5 INJECTION, SOLUTION INTRAMUSCULAR; INTRAVENOUS; SUBCUTANEOUS EVERY 6 HOURS PRN
Status: DISCONTINUED | OUTPATIENT
Start: 2023-08-22 | End: 2023-08-22

## 2023-08-22 RX ORDER — LIDOCAINE HYDROCHLORIDE 10 MG/ML
INJECTION, SOLUTION INFILTRATION; PERINEURAL PRN
Status: DISCONTINUED | OUTPATIENT
Start: 2023-08-22 | End: 2023-08-22

## 2023-08-22 RX ORDER — HEPARIN SODIUM 5000 [USP'U]/.5ML
5000 INJECTION, SOLUTION INTRAVENOUS; SUBCUTANEOUS ONCE
Status: COMPLETED | OUTPATIENT
Start: 2023-08-22 | End: 2023-08-22

## 2023-08-22 RX ORDER — FENTANYL CITRATE-0.9 % NACL/PF 10 MCG/ML
100 PLASTIC BAG, INJECTION (ML) INTRAVENOUS EVERY 5 MIN PRN
Status: DISCONTINUED | OUTPATIENT
Start: 2023-08-22 | End: 2023-08-22

## 2023-08-22 RX ORDER — LIDOCAINE 40 MG/G
CREAM TOPICAL
Status: DISCONTINUED | OUTPATIENT
Start: 2023-08-22 | End: 2023-08-23 | Stop reason: HOSPADM

## 2023-08-22 RX ORDER — ACETAMINOPHEN 325 MG/1
975 TABLET ORAL EVERY 6 HOURS
Status: DISCONTINUED | OUTPATIENT
Start: 2023-08-22 | End: 2023-08-23 | Stop reason: HOSPADM

## 2023-08-22 RX ORDER — NALOXONE HYDROCHLORIDE 0.4 MG/ML
0.2 INJECTION, SOLUTION INTRAMUSCULAR; INTRAVENOUS; SUBCUTANEOUS
Status: DISCONTINUED | OUTPATIENT
Start: 2023-08-22 | End: 2023-08-23 | Stop reason: HOSPADM

## 2023-08-22 RX ORDER — GLYCOPYRROLATE 0.2 MG/ML
INJECTION, SOLUTION INTRAMUSCULAR; INTRAVENOUS PRN
Status: DISCONTINUED | OUTPATIENT
Start: 2023-08-22 | End: 2023-08-22

## 2023-08-22 RX ORDER — KETAMINE HYDROCHLORIDE 10 MG/ML
INJECTION INTRAMUSCULAR; INTRAVENOUS PRN
Status: DISCONTINUED | OUTPATIENT
Start: 2023-08-22 | End: 2023-08-22

## 2023-08-22 RX ORDER — ENALAPRILAT 1.25 MG/ML
1.25 INJECTION INTRAVENOUS EVERY 6 HOURS PRN
Status: DISCONTINUED | OUTPATIENT
Start: 2023-08-22 | End: 2023-08-23 | Stop reason: HOSPADM

## 2023-08-22 RX ORDER — CEFAZOLIN SODIUM/WATER 3 G/30 ML
3 SYRINGE (ML) INTRAVENOUS SEE ADMIN INSTRUCTIONS
Status: DISCONTINUED | OUTPATIENT
Start: 2023-08-22 | End: 2023-08-22 | Stop reason: HOSPADM

## 2023-08-22 RX ORDER — FENTANYL CITRATE 50 UG/ML
25-100 INJECTION, SOLUTION INTRAMUSCULAR; INTRAVENOUS
Status: DISCONTINUED | OUTPATIENT
Start: 2023-08-22 | End: 2023-08-22 | Stop reason: HOSPADM

## 2023-08-22 RX ORDER — KETOROLAC TROMETHAMINE 30 MG/ML
30 INJECTION, SOLUTION INTRAMUSCULAR; INTRAVENOUS EVERY 6 HOURS
Status: DISCONTINUED | OUTPATIENT
Start: 2023-08-22 | End: 2023-08-23 | Stop reason: HOSPADM

## 2023-08-22 RX ORDER — DEXAMETHASONE SODIUM PHOSPHATE 10 MG/ML
INJECTION, SOLUTION INTRAMUSCULAR; INTRAVENOUS PRN
Status: DISCONTINUED | OUTPATIENT
Start: 2023-08-22 | End: 2023-08-22

## 2023-08-22 RX ORDER — ONDANSETRON 4 MG/1
4 TABLET, ORALLY DISINTEGRATING ORAL EVERY 6 HOURS PRN
Status: DISCONTINUED | OUTPATIENT
Start: 2023-08-22 | End: 2023-08-23 | Stop reason: HOSPADM

## 2023-08-22 RX ORDER — HYDROMORPHONE HYDROCHLORIDE 1 MG/ML
0.4 INJECTION, SOLUTION INTRAMUSCULAR; INTRAVENOUS; SUBCUTANEOUS EVERY 5 MIN PRN
Status: DISCONTINUED | OUTPATIENT
Start: 2023-08-22 | End: 2023-08-22 | Stop reason: HOSPADM

## 2023-08-22 RX ORDER — MORPHINE SULFATE 1 MG/ML
150 INJECTION, SOLUTION EPIDURAL; INTRATHECAL; INTRAVENOUS ONCE
Status: DISCONTINUED | OUTPATIENT
Start: 2023-08-22 | End: 2023-08-22 | Stop reason: HOSPADM

## 2023-08-22 RX ORDER — ONDANSETRON 2 MG/ML
INJECTION INTRAMUSCULAR; INTRAVENOUS PRN
Status: DISCONTINUED | OUTPATIENT
Start: 2023-08-22 | End: 2023-08-22

## 2023-08-22 RX ORDER — BUPROPION HYDROCHLORIDE 100 MG/1
100 TABLET ORAL 3 TIMES DAILY
Status: DISCONTINUED | OUTPATIENT
Start: 2023-08-23 | End: 2023-08-23 | Stop reason: HOSPADM

## 2023-08-22 RX ORDER — PEDI MULTIVIT NO.25/FOLIC ACID 300 MCG
1 TABLET,CHEWABLE ORAL 2 TIMES DAILY
Status: DISCONTINUED | OUTPATIENT
Start: 2023-08-22 | End: 2023-08-22

## 2023-08-22 RX ORDER — DEXMEDETOMIDINE HYDROCHLORIDE 4 UG/ML
INJECTION, SOLUTION INTRAVENOUS
Status: DISCONTINUED
Start: 2023-08-22 | End: 2023-08-22 | Stop reason: HOSPADM

## 2023-08-22 RX ORDER — PROPOFOL 10 MG/ML
INJECTION, EMULSION INTRAVENOUS PRN
Status: DISCONTINUED | OUTPATIENT
Start: 2023-08-22 | End: 2023-08-22

## 2023-08-22 RX ORDER — ONDANSETRON 2 MG/ML
4 INJECTION INTRAMUSCULAR; INTRAVENOUS
Status: COMPLETED | OUTPATIENT
Start: 2023-08-22 | End: 2023-08-22

## 2023-08-22 RX ORDER — ONDANSETRON 4 MG/1
4 TABLET, ORALLY DISINTEGRATING ORAL EVERY 30 MIN PRN
Status: DISCONTINUED | OUTPATIENT
Start: 2023-08-22 | End: 2023-08-22 | Stop reason: HOSPADM

## 2023-08-22 RX ADMIN — FAMOTIDINE 20 MG: 10 INJECTION, SOLUTION INTRAVENOUS at 08:13

## 2023-08-22 RX ADMIN — KETOROLAC TROMETHAMINE 30 MG: 30 INJECTION, SOLUTION INTRAMUSCULAR; INTRAVENOUS at 12:04

## 2023-08-22 RX ADMIN — ACETAMINOPHEN 975 MG: 325 SOLUTION ORAL at 19:22

## 2023-08-22 RX ADMIN — DEXMEDETOMIDINE HYDROCHLORIDE 0.5 MCG/KG/HR: 400 INJECTION INTRAVENOUS at 09:35

## 2023-08-22 RX ADMIN — POTASSIUM CHLORIDE, DEXTROSE MONOHYDRATE AND SODIUM CHLORIDE: 150; 5; 450 INJECTION, SOLUTION INTRAVENOUS at 13:05

## 2023-08-22 RX ADMIN — LIDOCAINE HYDROCHLORIDE 5 ML: 10 INJECTION, SOLUTION INFILTRATION; PERINEURAL at 09:35

## 2023-08-22 RX ADMIN — HEPARIN SODIUM 5000 UNITS: 5000 INJECTION, SOLUTION INTRAVENOUS; SUBCUTANEOUS at 09:51

## 2023-08-22 RX ADMIN — FENTANYL CITRATE 25 MCG: 50 INJECTION, SOLUTION INTRAMUSCULAR; INTRAVENOUS at 11:55

## 2023-08-22 RX ADMIN — ONDANSETRON 4 MG: 2 INJECTION INTRAMUSCULAR; INTRAVENOUS at 08:14

## 2023-08-22 RX ADMIN — ENOXAPARIN SODIUM 40 MG: 40 INJECTION SUBCUTANEOUS at 22:19

## 2023-08-22 RX ADMIN — PHENYLEPHRINE HYDROCHLORIDE 150 MCG: 10 INJECTION INTRAVENOUS at 10:06

## 2023-08-22 RX ADMIN — MIDAZOLAM HYDROCHLORIDE 1 MG: 1 INJECTION, SOLUTION INTRAMUSCULAR; INTRAVENOUS at 08:54

## 2023-08-22 RX ADMIN — ROCURONIUM BROMIDE 50 MG: 50 INJECTION, SOLUTION INTRAVENOUS at 09:35

## 2023-08-22 RX ADMIN — PHENYLEPHRINE HYDROCHLORIDE 200 MCG: 10 INJECTION INTRAVENOUS at 10:45

## 2023-08-22 RX ADMIN — FENTANYL CITRATE 50 MCG: 50 INJECTION, SOLUTION INTRAMUSCULAR; INTRAVENOUS at 08:53

## 2023-08-22 RX ADMIN — SODIUM CHLORIDE, POTASSIUM CHLORIDE, SODIUM LACTATE AND CALCIUM CHLORIDE: 600; 310; 30; 20 INJECTION, SOLUTION INTRAVENOUS at 08:28

## 2023-08-22 RX ADMIN — PROPOFOL 200 MCG/KG/MIN: 10 INJECTION, EMULSION INTRAVENOUS at 09:35

## 2023-08-22 RX ADMIN — Medication 3 G: at 09:20

## 2023-08-22 RX ADMIN — POTASSIUM CHLORIDE, DEXTROSE MONOHYDRATE AND SODIUM CHLORIDE: 150; 5; 450 INJECTION, SOLUTION INTRAVENOUS at 20:28

## 2023-08-22 RX ADMIN — ACETAMINOPHEN 1000 MG: 10 INJECTION, SOLUTION INTRAVENOUS at 12:46

## 2023-08-22 RX ADMIN — GABAPENTIN 600 MG: 300 CAPSULE ORAL at 08:09

## 2023-08-22 RX ADMIN — HYOSCYAMINE SULFATE 125 MCG: 0.12 TABLET SUBLINGUAL at 14:43

## 2023-08-22 RX ADMIN — KETOROLAC TROMETHAMINE 30 MG: 30 INJECTION, SOLUTION INTRAMUSCULAR; INTRAVENOUS at 17:40

## 2023-08-22 RX ADMIN — GABAPENTIN 250 MG: 250 SOLUTION ORAL at 22:18

## 2023-08-22 RX ADMIN — ACETAMINOPHEN 975 MG: 325 TABLET ORAL at 08:08

## 2023-08-22 RX ADMIN — PHENYLEPHRINE HYDROCHLORIDE 200 MCG: 10 INJECTION INTRAVENOUS at 10:30

## 2023-08-22 RX ADMIN — PROPOFOL 200 MG: 10 INJECTION, EMULSION INTRAVENOUS at 09:35

## 2023-08-22 RX ADMIN — PHENYLEPHRINE HYDROCHLORIDE 100 MCG: 10 INJECTION INTRAVENOUS at 10:39

## 2023-08-22 RX ADMIN — GABAPENTIN 250 MG: 250 SOLUTION ORAL at 14:37

## 2023-08-22 RX ADMIN — INDOCYANINE GREEN AND WATER 1.25 MG: KIT at 10:32

## 2023-08-22 RX ADMIN — KETAMINE HYDROCHLORIDE 50 MG: 10 INJECTION INTRAMUSCULAR; INTRAVENOUS at 09:35

## 2023-08-22 RX ADMIN — PHENYLEPHRINE HYDROCHLORIDE 100 MCG: 10 INJECTION INTRAVENOUS at 10:24

## 2023-08-22 RX ADMIN — SUGAMMADEX 280 MG: 100 INJECTION, SOLUTION INTRAVENOUS at 10:56

## 2023-08-22 RX ADMIN — DEXAMETHASONE SODIUM PHOSPHATE 10 MG: 10 INJECTION, SOLUTION INTRAMUSCULAR; INTRAVENOUS at 09:35

## 2023-08-22 RX ADMIN — GLYCOPYRROLATE 0.4 MG: 0.2 INJECTION INTRAMUSCULAR; INTRAVENOUS at 09:35

## 2023-08-22 RX ADMIN — PHENYLEPHRINE HYDROCHLORIDE 50 MCG: 10 INJECTION INTRAVENOUS at 09:35

## 2023-08-22 RX ADMIN — MAGNESIUM SULFATE HEPTAHYDRATE 4 G: 80 INJECTION, SOLUTION INTRAVENOUS at 08:34

## 2023-08-22 RX ADMIN — PHENYLEPHRINE HYDROCHLORIDE 200 MCG: 10 INJECTION INTRAVENOUS at 10:09

## 2023-08-22 RX ADMIN — LORAZEPAM 1 MG: 2 INJECTION INTRAMUSCULAR; INTRAVENOUS at 13:20

## 2023-08-22 RX ADMIN — SCOPALAMINE 1 PATCH: 1 PATCH, EXTENDED RELEASE TRANSDERMAL at 08:38

## 2023-08-22 RX ADMIN — ONDANSETRON 4 MG: 2 INJECTION INTRAMUSCULAR; INTRAVENOUS at 09:35

## 2023-08-22 ASSESSMENT — ACTIVITIES OF DAILY LIVING (ADL)
ADLS_ACUITY_SCORE: 18

## 2023-08-22 NOTE — ANESTHESIA POSTPROCEDURE EVALUATION
Patient: Ernestina Merlos    Procedure: Procedure(s):  CREATION, GASTRIC BYPASS, DARON-EN-Y, LAPAROSCOPIC       Anesthesia Type:  General    Note:  Disposition: Admission   Postop Pain Control: Uneventful            Sign Out: Well controlled pain   PONV: No   Neuro/Psych: Uneventful            Sign Out: Acceptable/Baseline neuro status   Airway/Respiratory: Uneventful            Sign Out: Acceptable/Baseline resp. status   CV/Hemodynamics: Uneventful            Sign Out: Acceptable CV status; No obvious hypovolemia; No obvious fluid overload   Other NRE: NONE   DID A NON-ROUTINE EVENT OCCUR? No           Last vitals:  Vitals Value Taken Time   /58 08/22/23 1230   Temp 36.2  C (97.1  F) 08/22/23 1230   Pulse 90 08/22/23 1231   Resp 20 08/22/23 1230   SpO2 98 % 08/22/23 1231   Vitals shown include unvalidated device data.    Electronically Signed By: Teo Maldonado MD  August 22, 2023  2:13 PM

## 2023-08-22 NOTE — ANESTHESIA PROCEDURE NOTES
"Intrathecal injection Procedure Note    Pre-Procedure   Staff -        Anesthesiologist:  Teo Maldonado MD       Performed By: anesthesiologist       Procedure Start/Stop Times: 8/22/2023 8:54 AM and 8/22/2023 9:04 AM       Pre-Anesthestic Checklist: patient identified, IV checked, risks and benefits discussed, informed consent, monitors and equipment checked, pre-op evaluation, at physician/surgeon's request and post-op pain management  Timeout:       Correct Patient: Yes        Correct Procedure: Yes        Correct Site: Yes        Correct Position: Yes   Procedure Documentation  Procedure: intrathecal injection       Patient Position: sitting       Skin prep: Chloraprep       Insertion Site: L2-3. (midline approach).       Needle Gauge: 24 (Unsuccessful attempts with 24 G, changed to 25 G 5 inch, failed attempt).        Needle Length (Inches): 4        Spinal Needle Type: Pencan       Introducer used       Introducer: 20 G       Number of attempts: 3 attempts with 24 G, 2 attempts with 25 G.    Assessment/Narrative         Paresthesias: Yes and Resolved (transient).       CSF fluid: clear.    Medication(s) Administered   Medication Administration Time: 8/22/2023 8:54 AM     Comments:  Aborted procedure.  Failed ITN placement.  Transient paresthesia, negative CSF, negative heme.      FOR [facility] ([facility]) ONLY:   Pain Team Contact information: please page the Pain Team Via [employer]. Search \"Pain\". During daytime hours, please page the attending first. At night please page the resident first.      "

## 2023-08-22 NOTE — OP NOTE
[facility]  Operative Note    Pre-operative diagnosis: Class 3 severe obesity due to excess calories with serious comorbidity and body mass index (BMI) of 50.0 to 59.9 in adult (H) [E66.01, Z68.43]   Post-operative diagnosis Morbid obesity   Procedure: Procedure(s):  CREATION, GASTRIC BYPASS, DARON-EN-Y, LAPAROSCOPIC   Surgeon: Moise Kim MD   Assistants(s): The assistance of Ellen Cagle CNP, was necessary for retraction and exposure during the course of the case.   Anesthesia: General with Block    Estimated blood loss: Less than 50 ml        Operative Indication:  Ernestina Merlos has a Body mass index is 49.68 kg/m . with associated co morbidities including GERD, HASMUKH. she has attempted weight loss through medical management, diet, and exercise alone without longterm success.  she is therefore pursuing bariatric surgery as a means of achieving long term weight loss and resolution of her bariatric comorbidities.       Drains: None   Specimens: None       Findings: None.   Complications: None.           Description of procedure:      Patient was brought to the operating room where after induction of general anesthesia with endotracheal intubation she was positioned with both arms out. She was then prepped and draped in standard sterile fashion. After a procedural pause, we began by injecting quarter percent Marcaine into the skin to the left of the umbilicus. This was then sharply incised.  Access to the abdomen was then obtained with an optical trocar.  After insufflation, additional trochars were placed across the mid abdomen under direct visualization.  A Damian liver retractor was then placed. Bilateral TAP blocks were performed under visual guidance with 0.25% marcaine, 25 cc per side.  The patient was then put into reverse Trendelenburg body positioning.       We began our dissection by exposing the angle of His, mobilizing the phrenoesophageal fat pad off the gastroesophageal  junction. With this completed we then proceeded to create a window on the lesser curvature of the stomach into the lesser sac. We then proceeded to divide the stomach with a green load 60 mm stapler approximately 8 centimeters distal to the gastroesophageal junction. A 32 Thai red rubber catheter was then advanced down into the stomach to size the pouch. We then created our gastric pouch with 2 additional firings of the blue load 60 mm stapler proceeding up to the angle of His. We then inspected our staple line for hemostasis which appeared excellent.     We next turned our attention to the small bowel. The ligament of Treitz was identified and measured approximately 70 cm distally.  It was then affixed to the gastric pouch with a running 2-0 Vicryl suture.  An opposing gastrotomy and enterotomy were then created, and utilizing a blue load 60 mm stapler we created a 30 mm gastroenterostomy.   The red rubber catheter was then advanced across the gastrojejunal anastomosis.  The common enterotomy was then closed with a running 2-0 Vicryl suture.  The small bowel was then divided,  the Jesse limb from the biliopancreatic limb with a firing of the white load 60 mm stapler.    We proceeded to perform a leak test.  The bowel was clamped distal to the gastrojejunal anastomosis.  Diluted ICG was then instilled into the gastric pouch and proximal jesse limb, with no evidence of leak noted.  We next turned our attention to the Petersons defect, which was then closed with a running 0 silk suture.     We then measured 150 cm distal to her gastrojejunal anastomosis.  This was then sutured to the biliopancreatic limb just proximal to the staple line with a 2-0 Vicryl suture.  Opposing enterotomies were then created and a 16mm enteroenterostomy created with a single firing of the white load stapler.  The common enterotomy was then closed with a running 2-0 Vicryl suture.   We next turned attention to the mesenteric  defect, which was then closed with a running 0 silk suture.  The Damian liver retractor was then removed under direct visualization.  Ports were removed and the skin was then closed with running and interrupted 4-0 Vicryl suture.        Moise Kim MD, FACS  Office: [phone]  [facility]   General and Bariatric Surgery

## 2023-08-22 NOTE — PHARMACY-ADMISSION MEDICATION HISTORY
Pharmacist Admission Medication History    Admission medication history is complete. The information provided in this note is only as accurate as the sources available at the time of the update.    Medication reconciliation/reorder completed by provider prior to medication history? No    Information Source(s): Patient and CareEverywhere/SureScripts via in-person    Pertinent Information: Patient stated her prescription for Wellbutrin  mg will change to Wellbutrin 100 mg IR tablet 1 tablet three times a day after procedure. Patient stated she takes 1 Vitamin D tablet containing 10,000 international unit(s) daily - we do not stock so entered as 5,000 international units 2 tablets daily to match home dose.     Changes made to PTA medication list:  Added: Collagen Gummy  Deleted: Trazodone- patient stated she has not taken in ~1 year  Changed: None    Medication Affordability:  Not including over the counter (OTC) medications, was there a time in the past 3 months when you did not take your medications as prescribed because of cost?: No    Allergies reviewed with patient and updates made in EHR: yes    Medication History Completed By: STEFANI HOLMAN [facility] 8/22/2023 8:17 AM    Prior to Admission medications    Medication Sig Last Dose Taking? Auth Provider Long Term End Date   buPROPion (WELLBUTRIN XL) 300 MG 24 hr tablet [BUPROPION (WELLBUTRIN XL) 300 MG 24 HR TABLET] Take 300 mg by mouth every morning. Past Week at sunday am - post op this will change to 100 mg IR tablet TID Yes Provider, Historical Yes    childrens multivitamin chewable tablet Take 1 tablet by mouth 2 times daily Multivitamin must contain Vitamin A, Zinc and at least 18 mg of iron. 8/21/2023 at am Yes MARYCRUZ Mccoy MD     cholecalciferol, vitamin D3, (VITAMIN D3) 5,000 unit Tab Take 2 tablets by mouth daily 8/21/2023 at am Yes Provider, Historical     cyanocobalamin (VITAMIN B-12) 1000 MCG sublingual tablet Place 1 tablet (1,000 mcg) under the  tongue daily 8/21/2023 at am Yes MARYCRUZ Mccoy MD     esomeprazole (NEXIUM) 20 MG capsule [ESOMEPRAZOLE (NEXIUM) 20 MG CAPSULE] Take 20 mg by mouth daily before breakfast. 8/21/2023 at am Yes Provider, Historical     etonogestreL (NEXPLANON) 68 mg Impl implant [ETONOGESTREL (NEXPLANON) 68 MG IMPL IMPLANT] 1 each by Subdermal route once. Unknown Yes Provider, Historical     magnesium citrate 125 mg cap [MAGNESIUM CITRATE 125 MG CAP] Take 2 capsules by mouth daily. Past Month at 8/7/23 Yes Provider, Historical     UNABLE TO FIND Take 1 each by mouth daily MEDICATION NAME: Collagen gummy once daily Past Week at am Yes Unknown, Entered By History

## 2023-08-22 NOTE — ANESTHESIA PREPROCEDURE EVALUATION
Anesthesia Pre-Procedure Evaluation    Patient: Ernestina Merlos   MRN: [id number] : 1990        Procedure : Procedure(s):  CREATION, GASTRIC BYPASS, DARON-EN-Y, LAPAROSCOPIC          Past Medical History:   Diagnosis Date    ADHD     Anxiety     Arthritis     Chicken pox 1997    Chronic bronchitis (H)     Depression     Dysmenorrhea     GERD (gastroesophageal reflux disease)     Hiatal hernia     Microscopic colitis     Panic attack     Raynaud disease     Shortness of breath     Sleep apnea     uses CPAP    Vitamin D deficiency       Past Surgical History:   Procedure Laterality Date    arthoscopy knee  10/20/2020    US ASPIRATION HEMATOMA SEROMA OR FLUID COLLECTION  2020    UVULOPALATOPHARYNGOPLASTY  2021    ZZC LAP,CHOLECYSTECTOMY/EXPLORE N/A 2021    Procedure: CHOLECYSTECTOMY, LAPAROSCOPIC;  Surgeon: Moise Kim MD;  Location: [facility];  Service: General      Allergies   Allergen Reactions    Amoxicillin Rash    Penicillins Rash    Septra [Sulfamethoxazole-Trimethoprim] Hives and Rash    Sulfamethizole Hives and Rash      Social History     Tobacco Use    Smoking status: Never    Smokeless tobacco: Never   Substance Use Topics    Alcohol use: Not Currently      Wt Readings from Last 1 Encounters:   23 139.6 kg (307 lb 12.8 oz)        Anesthesia Evaluation   Pt has had prior anesthetic.     No history of anesthetic complications       ROS/MED HX  ENT/Pulmonary:     (+) sleep apnea (denies HASMUKH after uvulectomy and tonsillectomy), mild,                                      Neurologic:  - neg neurologic ROS     Cardiovascular: Comment: Raynaud's syndrome      METS/Exercise Tolerance: >4 METS    Hematologic:  - neg hematologic  ROS     Musculoskeletal:   (+)  arthritis (psoriatic),             GI/Hepatic:     (+) GERD, Symptomatic,    hiatal hernia,              Renal/Genitourinary:  - neg Renal ROS     Endo:     (+)               Obesity (BMI 54),        Psychiatric/Substance Use:     (+) psychiatric history (ADHD, panic d/o) anxiety and depression       Infectious Disease:       Malignancy:       Other: Comment: Pt declines UPT.  No chance of pregnancy by pt report.           Physical Exam    Airway        Mallampati: III   TM distance: > 3 FB   Neck ROM: full   Mouth opening: > 3 cm    Respiratory Devices and Support         Dental     Comment: Good        Cardiovascular   cardiovascular exam normal          Pulmonary   pulmonary exam normal            OUTSIDE LABS:  CBC:   Lab Results   Component Value Date    WBC 8.4 08/07/2023    WBC 6.5 04/04/2023    HGB 13.9 08/07/2023    HGB 13.7 04/04/2023    HCT 41.8 08/07/2023    HCT 41.0 04/04/2023     08/07/2023     04/04/2023     BMP:   Lab Results   Component Value Date     08/07/2023     04/04/2023    POTASSIUM 4.3 08/07/2023    POTASSIUM 4.4 04/04/2023    CHLORIDE 107 08/07/2023    CHLORIDE 106 04/04/2023    CO2 24 08/07/2023    CO2 24 04/04/2023    BUN 15.0 08/07/2023    BUN 10.4 04/04/2023    CR 0.83 08/07/2023    CR 0.91 04/04/2023    GLC 88 08/22/2023     (H) 08/07/2023     COAGS:   Lab Results   Component Value Date    PTT 25 08/07/2023    INR 0.96 08/07/2023     POC: No results found for: BGM, HCG, HCGS  HEPATIC:   Lab Results   Component Value Date    ALBUMIN 4.4 08/07/2023    PROTTOTAL 7.2 08/07/2023    ALT 42 08/07/2023    AST 29 08/07/2023    ALKPHOS 67 08/07/2023    BILITOTAL 0.3 08/07/2023     OTHER:   Lab Results   Component Value Date    A1C 5.4 04/04/2023    ADRIENNE 9.6 08/07/2023    TSH 1.58 04/04/2023    CRP 0.7 05/20/2020    SED 14 05/20/2020       Anesthesia Plan    ASA Status:  3    NPO Status:  NPO Appropriate    Anesthesia Type: General.     - Airway: ETT   Induction: Intravenous, Propofol, RSI.   Maintenance: TIVA.   Techniques and Equipment:     - Airway: Video-Laryngoscope       Consents    Anesthesia Plan(s) and associated risks, benefits, and realistic  alternatives discussed. Questions answered and patient/representative(s) expressed understanding.     - Discussed:     - Discussed with:  Patient      - Extended Intubation/Ventilatory Support Discussed: No.      - Patient is DNR/DNI Status: No     Use of blood products discussed: Yes.     - Discussed with: Patient.     - Consented: consented to blood products            Reason for refusal: other.     Postoperative Care    Pain management: intrathecal morphine, Multi-modal analgesia.   PONV prophylaxis: Ondansetron (or other 5HT-3), Dexamethasone or Solumedrol, Scopolamine patch     Comments:    Other Comments: Glidescope    RSI with rocuronium    TIVA  Magnesium  Decadron 10 mg      Reverse with Sugammadex          Teo Maldonado MD

## 2023-08-22 NOTE — INTERVAL H&P NOTE
I have reviewed the surgical (or preoperative) H&P that is linked to this encounter, and examined the patient. There are no significant changes    Moise Kim MD, FACS  Office: [phone]  [facility]   General and Bariatric Surgery      Clinical Conditions Present on Arrival:  Clinically Significant Risk Factors Present on Admission

## 2023-08-22 NOTE — ANESTHESIA PROCEDURE NOTES
Airway       Patient location during procedure: OR       Procedure Start/Stop Times: 8/22/2023 9:37 AM  Staff -        CRNA: Samira Miguel APRN CRNA       Performed By: CRNA  Consent for Airway        Urgency: elective  Indications and Patient Condition       Indications for airway management: patrick-procedural       Induction type:intravenous       Mask difficulty assessment: 2 - vent by mask + OA or adjuvant +/- NMBA    Final Airway Details       Final airway type: endotracheal airway       Successful airway: ETT - single  Endotracheal Airway Details        ETT size (mm): 7.5       Cuffed: yes       Successful intubation technique: video laryngoscopy       VL Blade Size: Glidescope 3       Grade View of Cords: 1       Adjucts: stylet       Position: Right       Measured from: gums/teeth       Secured at (cm): 21       Bite block used: None    Post intubation assessment        Placement verified by: capnometry, equal breath sounds and chest rise        Number of attempts at approach: 1       Number of other approaches attempted: 0       Secured with: silk tape       Ease of procedure: easy       Dentition: Intact and Unchanged    Medication(s) Administered   Medication Administration Time: 8/22/2023 9:37 AM

## 2023-08-22 NOTE — PLAN OF CARE
Care Plan Progress Note:    Name: Ernestina Merlos  :   1990  MRN:   [id number]    Problem: Bariatric Procedure  Goal: Prevent post-op bariatric complications.  Appropriate oral intake.  Discharge needs are met.  Intervention:   Post Op Day 0/1 (progress as patient tolerates)   -Notify MD of excessive nausea   -NPO per MD orders; read exceptions to the order   -Toothette with 1/2 inch warm water at bedside until able to take PO   -Do not give ice chips, straws, or carbonation    -Whenever drinking liquids, patient must be upright with both feet on the floor   -No more than 30mL per sip   -All liquids must be at approximately room temperature (no extreme temperatures).   -After 2 hours of 30mL PO q30min, you may take 30mL as tolerated and advance to a clear liquid diet    -No oral pills until after the patient tolerates the 2 hours of 30mL sips (exceptions: sublingual medications can be given anytime; liquid gabapentin can be given after 1 hours of sips)   -Strict intake and output   -Bladder scan every 4 hours until voiding freely   -If tolerating sips, start bariatric clear liquid diet   -If tolerating bariatric clears, advance to a bariatric full liquid diet  Discharge Planning   -Educate patient about pill size   -Patient should take no pill greater than 1/4 inch   -Send pill cutter home with patient   -Nurse to review home discharge instructions  Outcome:    Shift  Intake: 196 IVF + 60 ml sips  Output: has not voided yet, arrived from PACU around 1300  Bladder Scan: 268 ml, no urge to void, continuing to monitor  Pain: endorsing pain 2.5-5/10, given IV APAP and prn ativan and hyoscyamine   Incision: bandages CDI, ice applied   Nausea: denies  Activity: ambulated to bed from cart, has been sitting at edge of bed  Incentive Spirometry: initial instructions given, patient able to reach 1500   Abdominal Binder: declines at this time     Matthias Warner RN  2023  3:27 PM

## 2023-08-22 NOTE — ANESTHESIA CARE TRANSFER NOTE
Patient: Ernestina Merlos    Procedure: Procedure(s):  CREATION, GASTRIC BYPASS, DARON-EN-Y, LAPAROSCOPIC       Diagnosis: Class 3 severe obesity due to excess calories with serious comorbidity and body mass index (BMI) of 50.0 to 59.9 in adult (H) [E66.01, Z68.43]  Diagnosis Additional Information: No value filed.    Anesthesia Type:   General     Note:    Oropharynx: oropharynx clear of all foreign objects and spontaneously breathing  Level of Consciousness: drowsy  Oxygen Supplementation: face mask  Level of Supplemental Oxygen (L/min / FiO2): 8  Independent Airway: airway patency satisfactory and stable  Dentition: dentition unchanged  Vital Signs Stable: post-procedure vital signs reviewed and stable  Report to RN Given: handoff report given  Patient transferred to: PACU    Handoff Report: Identifed the Patient, Identified the Reponsible Provider, Reviewed the pertinent medical history, Discussed the surgical course, Reviewed Intra-OP anesthesia mangement and issues during anesthesia, Set expectations for post-procedure period and Allowed opportunity for questions and acknowledgement of understanding      Vitals:  Vitals Value Taken Time   /58 08/22/23 1129   Temp 35.9  C (96.7  F) 08/22/23 1128   Pulse 86 08/22/23 1129   Resp 7 08/22/23 1129   SpO2 99 % 08/22/23 1129   Vitals shown include unvalidated device data.    Electronically Signed By: KUSHAL Chavez CRNA  August 22, 2023  11:31 AM

## 2023-08-22 NOTE — PROGRESS NOTES
Time in: 1:00 pm  /Time out: 1:30 pm   Pt presents for 1 week post op dietitian follow up. Educated pt on pureed and soft to bariatric regular diets. Provided grocery list and sample meal plan for each diet stage.  Pt will begin pureed diet on 8/30/23 and advance as tolerated to softs/bariatric regular on 9/13/23. Instructed pt to begin 500 mg calcium citrate BID and 5000IU Vitamin D3 3 weeks post op. Pt to follow up with RD at 3 months post op.     Valerie Rios RD

## 2023-08-23 VITALS
HEIGHT: 66 IN | TEMPERATURE: 98.3 F | WEIGHT: 293 LBS | SYSTOLIC BLOOD PRESSURE: 112 MMHG | BODY MASS INDEX: 47.09 KG/M2 | OXYGEN SATURATION: 97 % | HEART RATE: 70 BPM | DIASTOLIC BLOOD PRESSURE: 70 MMHG | RESPIRATION RATE: 24 BRPM

## 2023-08-23 PROCEDURE — 250N000013 HC RX MED GY IP 250 OP 250 PS 637: Performed by: NURSE PRACTITIONER

## 2023-08-23 PROCEDURE — 250N000011 HC RX IP 250 OP 636: Mod: JZ | Performed by: NURSE PRACTITIONER

## 2023-08-23 RX ORDER — BUPROPION HYDROCHLORIDE 100 MG/1
100 TABLET ORAL 3 TIMES DAILY
COMMUNITY
Start: 2023-08-23 | End: 2024-01-09

## 2023-08-23 RX ORDER — BUPROPION HYDROCHLORIDE 300 MG/1
300 TABLET ORAL EVERY MORNING
COMMUNITY
Start: 2023-10-03

## 2023-08-23 RX ORDER — ASCORBIC ACID 125 MG
2 TABLET,CHEWABLE ORAL DAILY
COMMUNITY
Start: 2023-08-23

## 2023-08-23 RX ORDER — GABAPENTIN 250 MG/5ML
250 SOLUTION ORAL EVERY 8 HOURS
Qty: 60 ML | Refills: 0 | Status: SHIPPED | OUTPATIENT
Start: 2023-08-23 | End: 2023-09-01

## 2023-08-23 RX ADMIN — ACETAMINOPHEN 975 MG: 325 SOLUTION ORAL at 00:21

## 2023-08-23 RX ADMIN — KETOROLAC TROMETHAMINE 30 MG: 30 INJECTION, SOLUTION INTRAMUSCULAR; INTRAVENOUS at 00:21

## 2023-08-23 RX ADMIN — GABAPENTIN 250 MG: 250 SOLUTION ORAL at 06:09

## 2023-08-23 RX ADMIN — Medication 1 TABLET: at 09:02

## 2023-08-23 RX ADMIN — OMEPRAZOLE 20 MG: 20 CAPSULE, DELAYED RELEASE ORAL at 09:02

## 2023-08-23 RX ADMIN — Medication 1000 MCG: at 09:02

## 2023-08-23 RX ADMIN — KETOROLAC TROMETHAMINE 30 MG: 30 INJECTION, SOLUTION INTRAMUSCULAR; INTRAVENOUS at 06:09

## 2023-08-23 RX ADMIN — ACETAMINOPHEN 975 MG: 325 SOLUTION ORAL at 06:08

## 2023-08-23 RX ADMIN — BUPROPION HYDROCHLORIDE 100 MG: 100 TABLET, FILM COATED ORAL at 09:03

## 2023-08-23 ASSESSMENT — ACTIVITIES OF DAILY LIVING (ADL)
ADLS_ACUITY_SCORE: 18

## 2023-08-23 NOTE — PLAN OF CARE
Goal Outcome Evaluation:    ..    Care Plan Progress Note:    Name: Ernestina Merlos  :   1990  MRN:   [id number]    Problem: Bariatric Procedure  Goal: Prevent post-op bariatric complications.  Appropriate oral intake.  Discharge needs are met.  Intervention:   Post Op Day 0/1 (progress as patient tolerates)   -Notify MD of excessive nausea   -NPO per MD orders; read exceptions to the order   -Toothette with 1/2 inch warm water at bedside until able to take PO   -Do not give ice chips, straws, or carbonation    -Whenever drinking liquids, patient must be upright with both feet on the floor   -No more than 30mL per sip   -All liquids must be at approximately room temperature (no extreme temperatures).   -After 2 hours of 30mL PO q30min, you may take 30mL as tolerated and advance to a clear liquid diet    -No oral pills until after the patient tolerates the 2 hours of 30mL sips (exceptions: sublingual medications can be given anytime; liquid gabapentin can be given after 1 hours of sips)   -Strict intake and output   -Bladder scan every 4 hours until voiding freely   -If tolerating sips, start bariatric clear liquid diet   -If tolerating bariatric clears, advance to a bariatric full liquid diet  Discharge Planning   -Educate patient about pill size   -Patient should take no pill greater than 1/4 inch   -Send pill cutter home with patient   -Nurse to review home discharge instructions  Outcome:    Shift  Intake:Tolerating clears, total intake 480 mls, diet to be advanced for breakfast.    Output: voiding freely, total output= 1,500 mls    Bladder Scan: Once at the start of the shift.    Pain: Well controlled with scheduled meds    Incision: 6 lap sites, CDI    Nausea: None    Activity: Ambulated in halls    Incentive Spirometry: 2500mls  Abdominal Binder: In room    Liliana Kirkland RN  2023  10:46 PM

## 2023-08-23 NOTE — DISCHARGE INSTRUCTIONS
If you are worried about whether or not you need to be seen or if something is wrong, please call your bariatric nurse line at [phone] or the bariatric clinic at [phone]. If you need to be seen in the emergency department for any reason in the next 30 days, please return to [facility]. The bariatric team should be involved in your care/diet even if the problem is unrelated to your surgery.

## 2023-08-23 NOTE — PLAN OF CARE
Pt set to discharge home accompanied by her family.  Discharge instructions were reviewed with the pt prior to leaving.

## 2023-08-23 NOTE — PLAN OF CARE
Care Plan Progress Note:    Name: Ernestina Merlos  :   1990  MRN:   [id number]    Problem: Bariatric Procedure  Goal: Prevent post-op bariatric complications.  Appropriate oral intake.  Discharge needs are met.  Intervention:   Post Op Day 0/1 (progress as patient tolerates)   -Notify MD of excessive nausea   -NPO per MD orders; read exceptions to the order   -Toothette with 1/2 inch warm water at bedside until able to take PO   -Do not give ice chips, straws, or carbonation    -Whenever drinking liquids, patient must be upright with both feet on the floor   -No more than 30mL per sip   -All liquids must be at approximately room temperature (no extreme temperatures).   -After 2 hours of 30mL PO q30min, you may take 30mL as tolerated and advance to a clear liquid diet    -No oral pills until after the patient tolerates the 2 hours of 30mL sips (exceptions: sublingual medications can be given anytime; liquid gabapentin can be given after 1 hours of sips)   -Strict intake and output   -Bladder scan every 4 hours until voiding freely   -If tolerating sips, start bariatric clear liquid diet   -If tolerating bariatric clears, advance to a bariatric full liquid diet  Discharge Planning   -Educate patient about pill size   -Patient should take no pill greater than 1/4 inch   -Send pill cutter home with patient   -Nurse to review home discharge instructions    Outcome: Pt is voiding freely, ambulating and tolerating sips    Shift  Intake: 240mL PO and 1000mL IVF  Output: 800mL  Bladder Scan: N/A  Pain: 3-4/10. Pain managed with scheduled medications.   Incision: 6 lap sites are C/D/I  Nausea: Denies  Activity: Ambulated to bathroom x2. Slept overnight.   Incentive Spirometry: 2500  Abdominal Binder: At bedside    Mickey Monsivais RN  2023  5:25 AM

## 2023-08-23 NOTE — PROGRESS NOTES
"Bariatric Surgery Progress Note    1 Day Post-Op    Procedure(s):  CREATION, GASTRIC BYPASS, DARON-EN-Y, LAPAROSCOPIC    Subjective:   Patient reports pain is controlled. Patient is tolerating bariatric full liquid diet, ambulating and voiding. Patient denies fever, chills, dizziness, blurred vision, headache, nausea, vomiting, SOB, CP, and leg pain.    Patient Vitals for the past 24 hrs:   BP Temp Temp src Pulse Resp SpO2 Height Weight   08/23/23 0700 112/70 98.3  F (36.8  C) Oral 70 24 97 % -- --   08/23/23 0413 130/76 98  F (36.7  C) Oral 72 18 98 % -- --   08/23/23 0000 127/74 98.4  F (36.9  C) Oral 84 18 97 % -- --   08/22/23 1510 117/65 98.3  F (36.8  C) Oral 94 20 100 % -- --   08/22/23 1410 116/57 -- -- 97 18 99 % -- --   08/22/23 1304 116/59 -- -- 86 22 98 % -- --   08/22/23 1240 128/62 97.7  F (36.5  C) Oral 88 20 97 % 1.676 m (5' 6\") 140.6 kg (310 lb)   08/22/23 1230 101/58 97.1  F (36.2  C) Temporal 89 20 99 % -- --   08/22/23 1215 102/59 -- -- 87 21 99 % -- --   08/22/23 1200 98/55 -- -- 86 28 97 % -- --   08/22/23 1145 105/58 -- -- 87 27 95 % -- --   08/22/23 1130 106/58 -- -- 85 27 99 % -- --   08/22/23 1128 107/58 (!) 96.7  F (35.9  C) -- 85 27 99 % -- --   08/22/23 0915 124/68 -- -- 98 28 98 % -- --   08/22/23 0910 (!) 139/96 -- -- 100 21 98 % -- --   08/22/23 0905 135/66 -- -- 101 24 99 % -- --       Physical Exam:  General: A/O, NAD  Ab:       Soft, + BS, expected ttp POD 1; The wound/ dressings are clean, dry, and intact  :      Patient is voiding adequate amount    Admission on 08/22/2023   Component Date Value    GLUCOSE BY METER POCT 08/22/2023 88        Assessment/Plan:   Patient is progressing well after surgery. Once she is meeting oral intake goals, she should be able to discharge home today. Discharge orders and instructions are placed    Discussed discharge instructions with the patient along with signs and symptoms to watch for post-op.  Patient verbalized understanding of the plan, " including:    - Use of incentive spirometer and walking as tolerated   - Use of abdominal binder if needed   - Importance of getting 48-64 ounces of water in daily for hydration    - Use of medication cups for measuring out sips for the next 3-4 days.   - Bariatric full liquid diet for the next week.   - Drink a protein shake providing 20-30 grams of protein in addition to meals daily; aim for a total of 40 grams of protein daily   - Attend the post-op dietary class next week   - Take omeprazole daily for the next 3 months and avoid/eliminate NSAID usage   - Take chewable MVI with 18mg iron twice a day and SL B12 1,000-2,500 mcg daily.    - Call Bariatric clinic with any questions or concerns   - If patient needs to be seen in the emergency department for any reason, she needs to return to [facility].    Pt is scheduled to follow up at Bariatric Clinic next week.  Patient verbalized understanding of the plan. Bariatric nurse clinician will call her in a couple days when she gets home to check in with her.    Greater than 45 minutes were spent with this patient with more than 50% of that time spent on education.    Ellen Cagle, CNP  [phone]  [facility] General and Bariatric Surgery

## 2023-08-23 NOTE — DISCHARGE SUMMARY
Bariatric Surgery Discharge Summary    Primary Care Physician:  Deann Luciano    Discharge Provider: KUSHAL Umaña CNP  Admission Date: 8/22/2023  Discharge Date: August 23, 2023     Primary Diagnosis at Discharge:   Patient Active Problem List   Diagnosis    Morbid obesity (H)    Calculus of gallbladder without cholecystitis without obstruction    Gastroesophageal reflux disease without esophagitis    Psoriatic arthritis (H)    Morbid (severe) obesity due to excess calories (H)      Disposition: Home   Condition at Discharge: Stable     Surgery: Laparoscopic Jesse-en-y Gastric Bypass     Hospital Summary:   Ernestina Merlos was admitted for morbid obesity.  she underwent an uncomplicated laparoscopic Jesse-en-y gastric bypass.  Following a brief recovery in the PACU, she was transferred to the Med/Surg floor for the remainder of her stay.  her post operative course was uneventful.  On POD # 1 she was discharged home tolerating a full liquid diet, ambulating without assistance, and pain controlled with oral analgesics.        Discharge Medications:      Medication List        Started      gabapentin 250 MG/5ML solution  Commonly known as: NEURONTIN  250 mg, Oral, EVERY 8 HOURS     Tylenol Dissolve Packs 500 MG Pack  Generic drug: Acetaminophen  1,000 mg, Oral, EVERY 6 HOURS            Modified      * buPROPion 100 MG tablet  Commonly known as: WELLBUTRIN  What changed: You were already taking a medication with the same name, and this prescription was added. Make sure you understand how and when to take each.     * buPROPion 300 MG 24 hr tablet  Commonly known as: WELLBUTRIN XL  Start taking on: October 3, 2023  What changed:   additional instructions  These instructions start on October 3, 2023. If you are unsure what to do until then, ask your doctor or other care provider.     esomeprazole 20 MG DR capsule  Commonly known as: NexIUM  What changed: additional instructions     Magnesium Citrate 125 MG  Caps  What changed: additional instructions     vitamin D3 125 MCG (5000 UT) tablet  Commonly known as: CHOLECALCIFEROL  What changed: additional instructions           * This list has 2 medication(s) that are the same as other medications prescribed for you. Read the directions carefully, and ask your doctor or other care provider to review them with you.                  Discharge Instructions:  Follow up appointment with Primary Care Physician: Deann Luciano  Follow up appointment with Dr. Kim in 2 weeks  Attend the post-op dietary class as scheduled    Post operative instructions:   Diet:     For one week following your surgery or until you meet with the dietician, you will be on a full liquid diet.  It is extremely important to follow the liquid diet to allow for optimal healing and to prevent food from becoming lodged at the gastric outlet.    Protein is an important part of the diet after surgery; therefore, it is necessary to drink fluids that are high in protein.  Proteins are building blocks that help you heal after surgery and help preserve your muscle mass while you are losing weight.      Including carbohydrates in the diet is also important after surgery to prevent your body from burning fat for energy (Ketosis).  These carbohydrates include: unsweetened applesauce, blended canned fruit (in its own juice), Stage I baby food fruit, thin cream of wheat, light yogurt (no fruit chunks), or 100% fruit juice diluted (50% juice/50% water).     Remember to take 1 Multivitamin with Iron 2 times daily and 1000 mcg of Sublingual B-12 daily.       Aim for 40-50 grams of protein daily during this week.    Sip 48-64 ounces of liquid per day in addition to full liquid meals/supplements.    After 2 weeks of the full liquid diet, you will advance to the pureed diet, as instructed.    Activity: You should continue to be active at home including ambulating frequently.  If possible try to limit the amount of time spent  in bed.    Restrictions: you should avoid lifting anything more than 20 pounds or strenuous physical activity for a total of 2 weeks.        KUSHAL Crow [facility]  [phone]  [facility] General and Bariatric Surgery

## 2023-08-23 NOTE — PROGRESS NOTES
Education was provided on the following prior to discharge:    You will remain on a full liquid diet until you follow up in the post op class with the dietician.  It is extremely important to follow the liquid diet to allow for optimal healing and to prevent food from becoming lodged at the pouch outlet.     Protein is an important part of the diet after surgery; therefore, it is necessary to drink fluids that are high in protein.  Proteins are building blocks that help you heal after surgery and help preserve your muscle mass while you are losing weight, aim for 40-50g daily for the first week      Remember to take 1 Multivitamin with Iron 2 times daily and 1000 mcg of Sublingual B-12 daily.     Sip 48-64 ounces of liquid per day in addition to full liquid meals/supplements, increase liquids slowly using 1oz measuring cup. After day 4 you are able to drink normally.      After 1 week of the full liquid diet, you will advance to the pureed diet, as instructed.      Charissa Hallman RD

## 2023-08-25 ENCOUNTER — TELEPHONE (OUTPATIENT)
Dept: SURGERY | Facility: CLINIC | Age: 33
End: 2023-08-25
Payer: COMMERCIAL

## 2023-08-25 ENCOUNTER — DOCUMENTATION ONLY (OUTPATIENT)
Dept: SURGERY | Facility: CLINIC | Age: 33
End: 2023-08-25
Payer: COMMERCIAL

## 2023-08-25 NOTE — PROGRESS NOTES
Received paperwork from the [employer]. Claim number [id number].    Completed and faxed to [phone]    Leave: 08/22/23-09/05/23  RTW: 09/06/23      Placed in my FMLA file in case changes are needed.

## 2023-08-29 ENCOUNTER — VIRTUAL VISIT (OUTPATIENT)
Dept: SURGERY | Facility: CLINIC | Age: 33
End: 2023-08-29
Payer: COMMERCIAL

## 2023-08-29 DIAGNOSIS — Z98.84 BARIATRIC SURGERY STATUS: Primary | ICD-10-CM

## 2023-08-29 PROCEDURE — 99207 PR PREOP VISIT IN GLOBAL PKG: CPT | Mod: 25

## 2023-09-01 ENCOUNTER — VIRTUAL VISIT (OUTPATIENT)
Dept: SURGERY | Facility: CLINIC | Age: 33
End: 2023-09-01
Payer: COMMERCIAL

## 2023-09-01 VITALS — HEIGHT: 66 IN | WEIGHT: 293 LBS | BODY MASS INDEX: 47.09 KG/M2

## 2023-09-01 DIAGNOSIS — Z48.89 POSTOPERATIVE VISIT: Primary | ICD-10-CM

## 2023-09-01 PROCEDURE — 99024 POSTOP FOLLOW-UP VISIT: CPT | Performed by: SURGERY

## 2023-09-01 NOTE — PROGRESS NOTES
"  The patient has been notified of following:     \"This telephone visit will be conducted via a call between you and your physician/provider. We have found that certain health care needs can be provided without the need for a physical exam.  This service lets us provide the care you need with a short phone conversation.  If a prescription is necessary we can send it directly to your pharmacy.  If lab work is needed we can place an order for that and you can then stop by our lab to have the test done at a later time.    Telephone visits are billed at different rates depending on your insurance coverage. During this emergency period, for some insurers they may be billed the same as an in-person visit.  Please reach out to your insurance provider with any questions.    If during the course of the call the physician/provider feels a telephone visit is not appropriate, you will not be charged for this service.\"    Patient has given verbal consent to a Telephone visit? Yes    What phone number would you like to be contacted at? [phone]    Patient [facility]    Are there any specific questions or needs that you would like addressed at your visit today? Patient would like to discuss possible allergy to Gabapentin. Otherwise doing well post op.      Telephone Start Time: 8:49 AM    HPI: Pt is here for follow up of a gastric bypass. She is doing well. Taking po well, estimating she is getting in 50 oz of fluid. No vomiting.  No pain with drinking or eating purees.  Had a bad experience with dumping related to mashed potatoes. No fevers or chills. Ambulating without problems.  Is concerned that she did not tolerate the gabapentin very well, thinks it caused a rash on her abdominal wall.      Current Outpatient Medications   Medication Sig Dispense Refill    Acetaminophen (TYLENOL DISSOLVE PACKS) 500 MG PACK 2 packets Orally Every 6 hours      [START ON 10/3/2023] buPROPion (WELLBUTRIN XL) " "300 MG 24 hr tablet Take 1 tablet (300 mg) by mouth every morning Can restart in 6 weeks when able to take medications whole.      buPROPion (WELLBUTRIN) 100 MG tablet Take 1 tablet (100 mg) by mouth 3 times daily      childrens multivitamin chewable tablet Take 1 tablet by mouth 2 times daily Multivitamin must contain Vitamin A, Zinc and at least 18 mg of iron. 180 tablet 3    cyanocobalamin (VITAMIN B-12) 1000 MCG sublingual tablet Place 1 tablet (1,000 mcg) under the tongue daily 90 tablet 3    esomeprazole (NEXIUM) 20 MG DR capsule Take 1 capsule (20 mg) by mouth daily before breakfast Open and sprinkle in food for the first 6 weeks after surgery. After 6 weeks you can take capsule whole      etonogestreL (NEXPLANON) 68 mg Impl implant [ETONOGESTREL (NEXPLANON) 68 MG IMPL IMPLANT] 1 each by Subdermal route once.      Magnesium Citrate 125 MG CAPS Take 2 capsules (250 mg) by mouth daily Do not start taking after surgery until advised by dietician in post-op class.      UNABLE TO FIND Take 1 each by mouth daily MEDICATION NAME: Collagen gummy once daily      vitamin D3 (CHOLECALCIFEROL) 125 MCG (5000 UT) tablet Take 2 tablets (250 mcg) by mouth daily Do not start taking after surgery until advised by dietician in post-op class.           Ht 1.676 m (5' 6\")   Wt 135.6 kg (299 lb)   LMP 08/15/2023 (Approximate)   BMI 48.26 kg/m    Wt Readings from Last 4 Encounters:   09/01/23 135.6 kg (299 lb)   08/22/23 140.6 kg (310 lb)   08/02/23 (!) 150.6 kg (332 lb)   06/22/23 (!) 151.7 kg (334 lb 8 oz)         Body mass index is 48.26 kg/m .    EXAM:  States her rash has cleared up.  Incisions are looking good, only focal and intermittent pain that is fairly mild.      Assessment/Plan: Pt s/p gastric bypass. Doing well. Diet and activity discussed. She will f/u with us at the Bariatric Center in 1 month to meet with our dietician, and again at 3 months for additional follow up.    Moise Kim MD, FACS  Office: " [phone]  [facility]   General and Bariatric Surgery    Telephone End Time (time telephone stopped):  0857    Originating Patient Location (pt. Location): Home      Distant Location (provider location):  On-site    Distant Location (provider location):  [facility] SURGERY CLINIC AND BARIATRICS CARE [place]

## 2023-09-01 NOTE — LETTER
"    9/1/2023         RE: Ernestina Merlos  [address]  [place] [address]        Dear Colleague,    Thank you for referring your patient, Ernestina Merlos, to the [facility] SURGERY CLINIC AND BARIATRICS CARE [place]. Please see a copy of my visit note below.      The patient has been notified of following:     \"This telephone visit will be conducted via a call between you and your physician/provider. We have found that certain health care needs can be provided without the need for a physical exam.  This service lets us provide the care you need with a short phone conversation.  If a prescription is necessary we can send it directly to your pharmacy.  If lab work is needed we can place an order for that and you can then stop by our lab to have the test done at a later time.    Telephone visits are billed at different rates depending on your insurance coverage. During this emergency period, for some insurers they may be billed the same as an in-person visit.  Please reach out to your insurance provider with any questions.    If during the course of the call the physician/provider feels a telephone visit is not appropriate, you will not be charged for this service.\"    Patient has given verbal consent to a Telephone visit? Yes    What phone number would you like to be contacted at? [phone]    Patient would like to receive their AVS by [employer]    Are there any specific questions or needs that you would like addressed at your visit today? Patient would like to discuss possible allergy to Gabapentin. Otherwise doing well post op.      Telephone Start Time: 8:49 AM    HPI: Pt is here for follow up of a gastric bypass. She is doing well. Taking po well, estimating she is getting in 50 oz of fluid. No vomiting.  No pain with drinking or eating purees.  Had a bad experience with dumping related to mashed potatoes. No fevers or chills. Ambulating without problems.  Is concerned that she did not tolerate " "the gabapentin very well, thinks it caused a rash on her abdominal wall.      Current Outpatient Medications   Medication Sig Dispense Refill     Acetaminophen (TYLENOL DISSOLVE PACKS) 500 MG PACK 2 packets Orally Every 6 hours       [START ON 10/3/2023] buPROPion (WELLBUTRIN XL) 300 MG 24 hr tablet Take 1 tablet (300 mg) by mouth every morning Can restart in 6 weeks when able to take medications whole.       buPROPion (WELLBUTRIN) 100 MG tablet Take 1 tablet (100 mg) by mouth 3 times daily       childrens multivitamin chewable tablet Take 1 tablet by mouth 2 times daily Multivitamin must contain Vitamin A, Zinc and at least 18 mg of iron. 180 tablet 3     cyanocobalamin (VITAMIN B-12) 1000 MCG sublingual tablet Place 1 tablet (1,000 mcg) under the tongue daily 90 tablet 3     esomeprazole (NEXIUM) 20 MG DR capsule Take 1 capsule (20 mg) by mouth daily before breakfast Open and sprinkle in food for the first 6 weeks after surgery. After 6 weeks you can take capsule whole       etonogestreL (NEXPLANON) 68 mg Impl implant [ETONOGESTREL (NEXPLANON) 68 MG IMPL IMPLANT] 1 each by Subdermal route once.       Magnesium Citrate 125 MG CAPS Take 2 capsules (250 mg) by mouth daily Do not start taking after surgery until advised by dietician in post-op class.       UNABLE TO FIND Take 1 each by mouth daily MEDICATION NAME: Collagen gummy once daily       vitamin D3 (CHOLECALCIFEROL) 125 MCG (5000 UT) tablet Take 2 tablets (250 mcg) by mouth daily Do not start taking after surgery until advised by dietician in post-op class.           Ht 1.676 m (5' 6\")   Wt 135.6 kg (299 lb)   LMP 08/15/2023 (Approximate)   BMI 48.26 kg/m    Wt Readings from Last 4 Encounters:   09/01/23 135.6 kg (299 lb)   08/22/23 140.6 kg (310 lb)   08/02/23 (!) 150.6 kg (332 lb)   06/22/23 (!) 151.7 kg (334 lb 8 oz)         Body mass index is 48.26 kg/m .    EXAM:  States her rash has cleared up.  Incisions are looking good, only focal and intermittent " pain that is fairly mild.      Assessment/Plan: Pt s/p gastric bypass. Doing well. Diet and activity discussed. She will f/u with us at the Bariatric Center in 1 month to meet with our dietician, and again at 3 months for additional follow up.    Moise Kim MD, FACS  Office: [phone]  [facility]   General and Bariatric Surgery    Telephone End Time (time telephone stopped):  4452    Originating Patient Location (pt. Location): Home      Distant Location (provider location):  On-site    Distant Location (provider location):  [facility] SURGERY CLINIC AND BARIATRICS CARE [place]                  Again, thank you for allowing me to participate in the care of your patient.        Sincerely,        Moise Kim MD

## 2023-09-20 ENCOUNTER — LAB REQUISITION (OUTPATIENT)
Dept: LAB | Facility: CLINIC | Age: 33
End: 2023-09-20
Payer: COMMERCIAL

## 2023-09-20 DIAGNOSIS — Z11.8 ENCOUNTER FOR SCREENING FOR OTHER INFECTIOUS AND PARASITIC DISEASES: ICD-10-CM

## 2023-09-20 PROCEDURE — 87491 CHLMYD TRACH DNA AMP PROBE: CPT | Mod: ORL | Performed by: PHYSICIAN ASSISTANT

## 2023-09-20 PROCEDURE — 87591 N.GONORRHOEAE DNA AMP PROB: CPT | Mod: ORL | Performed by: PHYSICIAN ASSISTANT

## 2023-09-21 LAB
C TRACH DNA SPEC QL PROBE+SIG AMP: NEGATIVE
N GONORRHOEA DNA SPEC QL NAA+PROBE: NEGATIVE

## 2023-09-22 ENCOUNTER — VIRTUAL VISIT (OUTPATIENT)
Dept: SURGERY | Facility: CLINIC | Age: 33
End: 2023-09-22
Payer: COMMERCIAL

## 2023-09-22 DIAGNOSIS — Z71.3 NUTRITIONAL COUNSELING: ICD-10-CM

## 2023-09-22 DIAGNOSIS — K91.2 POSTOPERATIVE MALABSORPTION: ICD-10-CM

## 2023-09-22 DIAGNOSIS — Z98.84 HISTORY OF ROUX-EN-Y GASTRIC BYPASS: Primary | ICD-10-CM

## 2023-09-22 PROCEDURE — 99024 POSTOP FOLLOW-UP VISIT: CPT | Mod: VID | Performed by: DIETITIAN, REGISTERED

## 2023-09-22 NOTE — LETTER
9/22/2023         RE: Ernestina Merlos  [address]  [place] [address]        Dear Colleague,    Thank you for referring your patient, Ernestina Merlos, to the [facility] SURGERY CLINIC AND BARIATRICS CARE [place]. Please see a copy of my visit note below.    Patient presents for 1 month post op dietitian follow up visit. Reports tolerating soft food diet well. Denies reflux, nausea/vomiting, constipation/diarrhea, or significant pain. Taking MVI and Vitamin B12 appropriately. Instructed patient to begin taking 500 mg calcium citrate BID and 5000 IU Vitamin D3. Educated patient on soft to bariatric regular diets, medications, developing an exercise regimen, and other dietary points of care. Provided education handout on items discussed. Patient to follow up with RD at 3 months post op.     Have you been to the hospital or seen by a doctor for any reason since your surgery? No    If yes:  Have you been seen in an outpatient clinic or doctors office after your surgery?  Yes  If yes, was this a routine follow-up? Yes-2 week post-op  Date of visit: 9/1/2023  Have you experienced any health problems since your surgery? No    Did you go to an Emergency Department or hospital after your surgery? No     Did you have additional surgery(ies) during this hospitalization? No    Date of surgery: 8/22/2023      Again, thank you for allowing me to participate in the care of your patient.        Sincerely,        Hayley Rubalcava RD

## 2023-09-22 NOTE — PROGRESS NOTES
Patient presents for 1 month post op dietitian follow up visit. Reports tolerating soft food diet well. Denies reflux, nausea/vomiting, constipation/diarrhea, or significant pain. Taking MVI and Vitamin B12 appropriately. Instructed patient to begin taking 500 mg calcium citrate BID and 5000 IU Vitamin D3. Educated patient on soft to bariatric regular diets, medications, developing an exercise regimen, and other dietary points of care. Provided education handout on items discussed. Patient to follow up with RD at 3 months post op.     Have you been to the hospital or seen by a doctor for any reason since your surgery? No    If yes:  Have you been seen in an outpatient clinic or doctors office after your surgery?  Yes  If yes, was this a routine follow-up? Yes-2 week post-op  Date of visit: 9/1/2023  Have you experienced any health problems since your surgery? No    Did you go to an Emergency Department or hospital after your surgery? No     Did you have additional surgery(ies) during this hospitalization? No    Date of surgery: 8/22/2023

## 2023-11-22 ENCOUNTER — VIRTUAL VISIT (OUTPATIENT)
Dept: SURGERY | Facility: CLINIC | Age: 33
End: 2023-11-22
Payer: COMMERCIAL

## 2023-11-22 DIAGNOSIS — Z71.3 NUTRITIONAL COUNSELING: ICD-10-CM

## 2023-11-22 DIAGNOSIS — Z98.84 HISTORY OF ROUX-EN-Y GASTRIC BYPASS: Primary | ICD-10-CM

## 2023-11-22 DIAGNOSIS — K91.2 POSTOPERATIVE MALABSORPTION: ICD-10-CM

## 2023-11-22 PROCEDURE — 97803 MED NUTRITION INDIV SUBSEQ: CPT | Mod: VID | Performed by: DIETITIAN, REGISTERED

## 2023-11-22 NOTE — PROGRESS NOTES
"Ernestina Merlos is a 33 year old who is being evaluated via a billable video visit.    How would you like to obtain your AVS? MyChart  If the video visit is dropped, the invitation should be resent by: Send to e-mail at: [email or web address]  Will anyone else be joining your video visit? No  {If patient encounters technical issues they should call [phone]      Post-op Surgical Weight Loss Diet Evaluation     Assessment:  Pt presents for 3 months post-op RD visit, s/p RNY GB on 8/22/2023 with Dr. Kim. Today we reviewed current eating habits and level of physical activity, and instructed on the changes that are required for successful bariatric outcomes.    Patient Progress: going good, no major concerns     Initial weight: 307.7 lbs  Current weight: 271 lbs   Weight change: 36.7 lbs (down)     There is no height or weight on file to calculate BMI.    Patient Active Problem List   Diagnosis    Morbid obesity (H)    Calculus of gallbladder without cholecystitis without obstruction    Gastroesophageal reflux disease without esophagitis    Psoriatic arthritis (H)    Morbid (severe) obesity due to excess calories (H)       Diabetes: No     Vitamins   Multi Vit with Iron: yes  Calcium Citrate: yes  B12: yes  D3: yes    Do you have \"dumping\" syndrome? No   Do you experience any reflux or discomfort with eating? No  Hair loss:yes    Diet Recall/Time:   Breakfast: Breakfast [place]   Lunch: Protein Bar   Dinner: taco salad or turkey BLT sandwich    Typical Snacks: Protein Shake 1 time/day, cheese stick on occasion    Proteins/Veg/Fruits/CHO (NOT well tolerated): none     Estimated protein intake: 60 grams    Estimated portion size per meals:1/2 cup/meal    Meal Duration:20-30 minutes    Fluid-meal separation:  Fluids are  30min before and 30 minutes after meals.    Fluid Intake  Water: 50 oz/day  Caffeine: occasional cup of coffee     Exercise: weight lifting, walking as able/tolerated-weather pending, " "her new place does have a gym onset to work out in the winter months      PES statement:      (NC-1.4) Altered GI Function related to Alteration in gastrointestinal tract structure and/or function/ Decreased functional length of the GI tract as evidenced by Weight loss of 12% initial body weight; Gastric bypass surgery  Intervention    Discussion  Discussed 3 Months Post-Op Nutritional Guidelines for RNY GB  Recommended to consume 15-20gm protein at 3 meals daily, along with protein supplement/\"planned protein containing snack\" of 15-30gm protein, to reach goal of 60-80 gm protein daily.  Educated on post-op vitamin regimen: Multi Vit + iron 2x/day, calcium citrate 400-600 mg 2x/day, 6765-2785 mcg of Sublingual B-12 daily, and 5000 IU Vitamin D3 daily (MVI and calcium can be taken at the same time BID)  Reviewed lean protein sources  Bariatric Plate Method-  including lean/low fat protein at each meal, including a vegetable/fruit, and limiting carbohydrate intake to less than 25% of plate volume.     Instructions  Include 15-20gm protein at each meal, along with protein supplement/\"planned protein containing snack\" of 15-30gm protein, to reach goal of 60-80 gm protein daily.  Increase fluid intake to 64oz daily: choose plain or calorie/carbonation-free beverages.  Incorporate daily structured activity, 30-60 minutes most days of the week  Recommended pt to start taking: Multi Vit + iron 2x/day, calcium citrate 400-600 mg 2x/day, 6463-4265 mcg of Sublingual B-12 daily, and 5000 IU Vitamin D3 daily. (MVI and calcium can be taken at the same time)  Read food labels more consistently: keeping total fat grams <10, total sugar grams <10, fiber >3gm per serving.  Increase vegetable/fruit intake, by having a vegetable or fruit with each meal daily.  Practice plate method: 1/2 plate lean/low fat protein source, vegetable/fruit, <25% of plate complex carbohydrates.  Separate fluids 30 minutes before/after meal " times.  Practice eating off of smaller plates/bowls, chewing to applesauce consistency, taking 20-30 minutes to eat in a calm/relaxed environment without distractions of tv/email/cell phone.    Handouts provided:  3 months Post-Op Nutritional Guidelines for RNY GB    Assessment/Plan:    Pt to follow up for 6 months post-op visit with bariatrician       Video-Visit Details    Type of service:  Video Visit    Video Start Time (time video started):  8:46 am    Video End Time (time video stopped):  9:00 am     Originating Location (pt. Location): Home      Distant Location (provider location):  Off-site    Mode of Communication:  Video Conference via [facility]    Physician has received verbal consent for a Video Visit from the patient? Yes    Hayley Rubalcava, RD

## 2023-11-22 NOTE — LETTER
"    11/22/2023         RE: Ernestina Merlos  [address]  [place] [address]        Dear Colleague,    Thank you for referring your patient, Ernestina Merlos, to the [facility] [place]. Please see a copy of my visit note below.    Ernestina Merlos is a 33 year old who is being evaluated via a billable video visit.    How would you like to obtain your AVS? MyChart  If the video visit is dropped, the invitation should be resent by: Send to e-mail at: [email or web address]  Will anyone else be joining your video visit? No  {If patient encounters technical issues they should call [phone]      Post-op Surgical Weight Loss Diet Evaluation     Assessment:  Pt presents for 3 months post-op RD visit, s/p RNY GB on 8/22/2023 with Dr. Kim. Today we reviewed current eating habits and level of physical activity, and instructed on the changes that are required for successful bariatric outcomes.    Patient Progress: going good, no major concerns     Initial weight: 307.7 lbs  Current weight: 271 lbs   Weight change: 36.7 lbs (down)     There is no height or weight on file to calculate BMI.    Patient Active Problem List   Diagnosis     Morbid obesity (H)     Calculus of gallbladder without cholecystitis without obstruction     Gastroesophageal reflux disease without esophagitis     Psoriatic arthritis (H)     Morbid (severe) obesity due to excess calories (H)       Diabetes: No     Vitamins   Multi Vit with Iron: yes  Calcium Citrate: yes  B12: yes  D3: yes    Do you have \"dumping\" syndrome? No   Do you experience any reflux or discomfort with eating? No  Hair loss:yes    Diet Recall/Time:   Breakfast: Breakfast [place]   Lunch: Protein Bar   Dinner: taco salad or turkey BLT sandwich    Typical Snacks: Protein Shake 1 time/day, cheese stick on occasion    Proteins/Veg/Fruits/CHO (NOT well tolerated): none     Estimated protein intake: 60 grams    Estimated portion " "size per meals:1/2 cup/meal    Meal Duration:20-30 minutes    Fluid-meal separation:  Fluids are  30min before and 30 minutes after meals.    Fluid Intake  Water: 50 oz/day  Caffeine: occasional cup of coffee     Exercise: weight lifting, walking as able/tolerated-weather pending, her new place does have a gym onset to work out in the winter months      PES statement:      (NC-1.4) Altered GI Function related to Alteration in gastrointestinal tract structure and/or function/ Decreased functional length of the GI tract as evidenced by Weight loss of 12% initial body weight; Gastric bypass surgery  Intervention    Discussion  Discussed 3 Months Post-Op Nutritional Guidelines for RNY GB  Recommended to consume 15-20gm protein at 3 meals daily, along with protein supplement/\"planned protein containing snack\" of 15-30gm protein, to reach goal of 60-80 gm protein daily.  Educated on post-op vitamin regimen: Multi Vit + iron 2x/day, calcium citrate 400-600 mg 2x/day, 4422-4605 mcg of Sublingual B-12 daily, and 5000 IU Vitamin D3 daily (MVI and calcium can be taken at the same time BID)  Reviewed lean protein sources  Bariatric Plate Method-  including lean/low fat protein at each meal, including a vegetable/fruit, and limiting carbohydrate intake to less than 25% of plate volume.     Instructions  Include 15-20gm protein at each meal, along with protein supplement/\"planned protein containing snack\" of 15-30gm protein, to reach goal of 60-80 gm protein daily.  Increase fluid intake to 64oz daily: choose plain or calorie/carbonation-free beverages.  Incorporate daily structured activity, 30-60 minutes most days of the week  Recommended pt to start taking: Multi Vit + iron 2x/day, calcium citrate 400-600 mg 2x/day, 1590-8119 mcg of Sublingual B-12 daily, and 5000 IU Vitamin D3 daily. (MVI and calcium can be taken at the same time)  Read food labels more consistently: keeping total fat grams <10, total sugar grams " <10, fiber >3gm per serving.  Increase vegetable/fruit intake, by having a vegetable or fruit with each meal daily.  Practice plate method: 1/2 plate lean/low fat protein source, vegetable/fruit, <25% of plate complex carbohydrates.  Separate fluids 30 minutes before/after meal times.  Practice eating off of smaller plates/bowls, chewing to applesauce consistency, taking 20-30 minutes to eat in a calm/relaxed environment without distractions of tv/email/cell phone.    Handouts provided:  3 months Post-Op Nutritional Guidelines for RNY GB    Assessment/Plan:    Pt to follow up for 6 months post-op visit with bariatrician       Video-Visit Details    Type of service:  Video Visit    Video Start Time (time video started):  8:46 am    Video End Time (time video stopped):  9:00 am     Originating Location (pt. Location): Home      Distant Location (provider location):  Off-site    Mode of Communication:  Video Conference via [facility]    Physician has received verbal consent for a Video Visit from the patient? Yes    Hayley Rubalcava RD            Again, thank you for allowing me to participate in the care of your patient.        Sincerely,        Hayley Rubalcava RD

## 2024-01-09 ENCOUNTER — MYC MEDICAL ADVICE (OUTPATIENT)
Dept: SURGERY | Facility: CLINIC | Age: 34
End: 2024-01-09
Payer: COMMERCIAL

## 2024-01-09 DIAGNOSIS — K91.2 POSTOPERATIVE MALABSORPTION: ICD-10-CM

## 2024-01-09 DIAGNOSIS — Z98.84 HISTORY OF ROUX-EN-Y GASTRIC BYPASS: Primary | ICD-10-CM

## 2024-01-09 DIAGNOSIS — K90.9 INTESTINAL MALABSORPTION, UNSPECIFIED TYPE: ICD-10-CM

## 2024-01-09 NOTE — TELEPHONE ENCOUNTER
Hayley Rubalcava RD  P Bariatric [facility]  Internal 6 months post-op RNY labs needed for appointment with Doris Mccoy MD on 2/7/2024.

## 2024-01-30 ENCOUNTER — LAB (OUTPATIENT)
Dept: LAB | Facility: CLINIC | Age: 34
End: 2024-01-30
Payer: COMMERCIAL

## 2024-01-30 DIAGNOSIS — K90.9 INTESTINAL MALABSORPTION, UNSPECIFIED TYPE: ICD-10-CM

## 2024-01-30 DIAGNOSIS — Z98.84 HISTORY OF ROUX-EN-Y GASTRIC BYPASS: ICD-10-CM

## 2024-01-30 DIAGNOSIS — K91.2 POSTOPERATIVE MALABSORPTION: ICD-10-CM

## 2024-01-30 LAB
ALBUMIN SERPL BCG-MCNC: 4.3 G/DL (ref 3.5–5.2)
ALP SERPL-CCNC: 96 U/L (ref 40–150)
ALT SERPL W P-5'-P-CCNC: 27 U/L (ref 0–50)
ANION GAP SERPL CALCULATED.3IONS-SCNC: 10 MMOL/L (ref 7–15)
AST SERPL W P-5'-P-CCNC: 21 U/L (ref 0–45)
BILIRUB SERPL-MCNC: 0.4 MG/DL
BUN SERPL-MCNC: 5.9 MG/DL (ref 6–20)
CALCIUM SERPL-MCNC: 9.6 MG/DL (ref 8.6–10)
CHLORIDE SERPL-SCNC: 105 MMOL/L (ref 98–107)
CHOLEST SERPL-MCNC: 136 MG/DL
CREAT SERPL-MCNC: 0.91 MG/DL (ref 0.51–0.95)
DEPRECATED HCO3 PLAS-SCNC: 27 MMOL/L (ref 22–29)
EGFRCR SERPLBLD CKD-EPI 2021: 86 ML/MIN/1.73M2
ERYTHROCYTE [DISTWIDTH] IN BLOOD BY AUTOMATED COUNT: 12.8 % (ref 10–15)
FASTING STATUS PATIENT QL REPORTED: YES
FERRITIN SERPL-MCNC: 81 NG/ML (ref 6–175)
FOLATE SERPL-MCNC: 5.8 NG/ML (ref 4.6–34.8)
GLUCOSE SERPL-MCNC: 89 MG/DL (ref 70–99)
HCT VFR BLD AUTO: 41.4 % (ref 35–47)
HDLC SERPL-MCNC: 50 MG/DL
HGB BLD-MCNC: 13.9 G/DL (ref 11.7–15.7)
LDLC SERPL CALC-MCNC: 70 MG/DL
MCH RBC QN AUTO: 32.8 PG (ref 26.5–33)
MCHC RBC AUTO-ENTMCNC: 33.6 G/DL (ref 31.5–36.5)
MCV RBC AUTO: 98 FL (ref 78–100)
NONHDLC SERPL-MCNC: 86 MG/DL
PLATELET # BLD AUTO: 231 10E3/UL (ref 150–450)
POTASSIUM SERPL-SCNC: 3.9 MMOL/L (ref 3.4–5.3)
PROT SERPL-MCNC: 6.9 G/DL (ref 6.4–8.3)
PTH-INTACT SERPL-MCNC: 41 PG/ML (ref 15–65)
RBC # BLD AUTO: 4.24 10E6/UL (ref 3.8–5.2)
SODIUM SERPL-SCNC: 142 MMOL/L (ref 135–145)
TRIGL SERPL-MCNC: 78 MG/DL
VIT B12 SERPL-MCNC: 1013 PG/ML (ref 232–1245)
VIT D+METAB SERPL-MCNC: 51 NG/ML (ref 20–50)
WBC # BLD AUTO: 6.5 10E3/UL (ref 4–11)

## 2024-01-30 PROCEDURE — 84630 ASSAY OF ZINC: CPT | Mod: 90

## 2024-01-30 PROCEDURE — 84590 ASSAY OF VITAMIN A: CPT | Mod: 90

## 2024-01-30 PROCEDURE — 82607 VITAMIN B-12: CPT

## 2024-01-30 PROCEDURE — 80061 LIPID PANEL: CPT

## 2024-01-30 PROCEDURE — 82746 ASSAY OF FOLIC ACID SERUM: CPT

## 2024-01-30 PROCEDURE — 82728 ASSAY OF FERRITIN: CPT

## 2024-01-30 PROCEDURE — 83970 ASSAY OF PARATHORMONE: CPT

## 2024-01-30 PROCEDURE — 84425 ASSAY OF VITAMIN B-1: CPT | Mod: 90

## 2024-01-30 PROCEDURE — 85027 COMPLETE CBC AUTOMATED: CPT

## 2024-01-30 PROCEDURE — 80053 COMPREHEN METABOLIC PANEL: CPT

## 2024-01-30 PROCEDURE — 36415 COLL VENOUS BLD VENIPUNCTURE: CPT

## 2024-01-30 PROCEDURE — 99000 SPECIMEN HANDLING OFFICE-LAB: CPT

## 2024-01-30 PROCEDURE — 82306 VITAMIN D 25 HYDROXY: CPT

## 2024-01-31 LAB — ZINC SERPL-MCNC: 90.5 UG/DL

## 2024-02-02 LAB
ANNOTATION COMMENT IMP: NORMAL
RETINYL PALMITATE SERPL-MCNC: <0.02 MG/L
VIT A SERPL-MCNC: 0.43 MG/L

## 2024-02-03 LAB — VIT B1 PYROPHOSHATE BLD-SCNC: 107 NMOL/L

## 2024-02-07 ENCOUNTER — VIRTUAL VISIT (OUTPATIENT)
Dept: SURGERY | Facility: CLINIC | Age: 34
End: 2024-02-07
Payer: COMMERCIAL

## 2024-02-07 VITALS — BODY MASS INDEX: 40.02 KG/M2 | HEIGHT: 66 IN | WEIGHT: 249 LBS

## 2024-02-07 DIAGNOSIS — K91.2 POSTOPERATIVE MALABSORPTION: Primary | ICD-10-CM

## 2024-02-07 DIAGNOSIS — E66.01 MORBID OBESITY (H): ICD-10-CM

## 2024-02-07 PROCEDURE — 99214 OFFICE O/P EST MOD 30 MIN: CPT | Mod: 95 | Performed by: FAMILY MEDICINE

## 2024-02-07 ASSESSMENT — PAIN SCALES - GENERAL: PAINLEVEL: NO PAIN (0)

## 2024-02-07 NOTE — NURSING NOTE
Is the patient currently in the state of [place]? YES    Visit mode:VIDEO    If the visit is dropped, the patient can be reconnected by: VIDEO VISIT: Text to cell phone:   Telephone Information:   Mobile [phone]       Will anyone else be joining the visit? NO  (If patient encounters technical issues they should call [phone]    How would you like to obtain your AVS? MyChart    Are changes needed to the allergy or medication list? Pt stated no changes to allergies and Pt stated no med changes  Please remove any meds marked not taking and any flagged for removal.    Reason for visit: RECHECK    Wt/ht other than 24 hrs:    Pain more than one location:  no  Gayle CASTILLOF

## 2024-02-07 NOTE — LETTER
2/7/2024         RE: Ernestina Merlos  [address]  [place] [address]        Dear Colleague,    Thank you for referring your patient, Ernestina Merlos, to the [facility]ATRICS [facility] [place]. Please see a copy of my visit note below.    Bariatric Care Clinic Follow Up Visit for Previous Bariatric Surgery   Date of visit: 2/7/2024  Physician: EVITA Mccoy MD, MD  Primary Care is Deann Luciano  Ernestina Merlos   33 year old  female    Date of Surgery: 8/22/23  Initial Weight: 328#  Initial BMI: 52.9  Today's Weight:   Wt Readings from Last 1 Encounters:   02/07/24 112.9 kg (249 lb)     Body mass index is 40.19 kg/m .       Assessment and Plan   Assessment: Ernestina is a 33 year old year old female who is 6 months s/p RNY gastric bypass with Dr. Kim. They have had a durable weight loss of 79 lbs since surgery.  Overall compliance with the [facility] Bariatric Surgery Program has been excellent.      Plan:    1. Postoperative malabsorption  Patient is taking all vitamins as directed.  Recent routine postoperative labs were normal. She was reminded to slow down, chew foods thoroughly, and to avoid liquids within 30 minutes of solids. Written information was given. She was congratulated on her success thus far.     2. Morbid obesity (H)  Patient was congratulated on her success thus far. Healthy habits to assist with further weight loss were discussed. She will try to add some weight training to keep up her muscle mass and therefore her metabolism.       Total time spent on the date of this encounter doing: chart review, review of test results, patient visit, physical exam, education, counseling, developing plan of care and documenting = 33 minutes.      Bariatric Surgery Review   Interim History/LifeChanges: She is struggling with seeing her weight loss when she looks at herself. It is easier for her to do everyday activities. She no longer has knee pain.      Patient  "Concerns: some hair loss    GERD : gone away    Medication changes: stopped ursodiol and omeprazole    Vitamin Intake:   Multivitamin   Twice a day with iron   Vitamin D  5000 iU   Calcium  Citrate once a day   Vit. B-12    SL     Habits:            Alcohol Intake  occasional   NSAID Use  no   Caffeine Use  [employer] shaken expresso a couple of times a week   Exercise  Walking, stairs, 30 minutes 3-4 x per week   CPAP Use:  No, sleep apnea resolved with tonsil and adenoid removal   Birth Control  Nexplanon removed right after surgery, now has IUD   Tobacco Use     none                                      LABS: reviewed, normal      LABS:  Lab Results   Component Value Date    WBC 6.5 01/30/2024    HGB 13.9 01/30/2024    HCT 41.4 01/30/2024    MCV 98 01/30/2024     01/30/2024      No components found for: \"RUKVFLHC83ZL\" No results found for: \"HGBA1C\"   Lab Results   Component Value Date    CHOL 136 01/30/2024    No components found for: \"PTH\"      No components found for: \"FERRITIN\"   Lab Results   Component Value Date    HDL 50 01/30/2024      No components found for: \"YQZVCLCQ53\" No components found for: \"39003\"   No components found for: \"LDLCALC\" Lab Results   Component Value Date    TSH 1.58 04/04/2023    No components found for: \"FOLATE\"   Lab Results   Component Value Date    TRIG 78 01/30/2024    Lab Results   Component Value Date    ALT 27 01/30/2024    AST 21 01/30/2024    ALKPHOS 96 01/30/2024    No results found for: \"TESTOSTERONE\"     No results found for: \"CHOLHDL\" No components found for: \"7597\"   @[facility](vitamin a: 1)@             Patient Profile   Social History     Social History Narrative     Not on file        Past Medical History   Past Medical History:   Diagnosis Date     ADHD      Anxiety      Arthritis      Chicken pox 01/01/1997     Chronic bronchitis (H)      Depression      Dysmenorrhea      GERD (gastroesophageal reflux disease)      Hiatal hernia      Microscopic colitis      " "Panic attack      Raynaud disease      Shortness of breath      Sleep apnea     uses CPAP     Vitamin D deficiency      Patient Active Problem List   Diagnosis     Morbid obesity (H)     Calculus of gallbladder without cholecystitis without obstruction     Gastroesophageal reflux disease without esophagitis     Psoriatic arthritis (H)     Morbid (severe) obesity due to excess calories (H)     [unfilled]    Past Surgical History  She has a past surgical history that includes Us Aspiration Hematoma Seroma Or Fluid Collection (03/12/2020); LAP,CHOLECYSTECTOMY/EXPLORE (N/A, 04/28/2021); arthoscopy knee (10/20/2020); Uvulopalatopharyngoplasty (09/24/2021); and Creation, Gastric Bypass, Jesse-En-Y, Laparoscopic (N/A, 8/22/2023).     Examination   Ht 1.676 m (5' 6\")   Wt 112.9 kg (249 lb)   BMI 40.19 kg/m      Wt Readings from Last 4 Encounters:   02/07/24 112.9 kg (249 lb)   09/01/23 135.6 kg (299 lb)   08/22/23 140.6 kg (310 lb)   08/02/23 (!) 150.6 kg (332 lb)      GENERAL: alert and no distress  EYES: Eyes grossly normal to inspection.  No discharge or erythema, or obvious scleral/conjunctival abnormalities.  RESP: No audible wheeze, cough, or visible cyanosis.    SKIN: Visible skin clear. No significant rash, abnormal pigmentation or lesions.  NEURO: Cranial nerves grossly intact.  Mentation and speech appropriate for age.  PSYCH: Appropriate affect, tone, and pace of words          Counseling:   We reviewed the important post op bariatric recommendations:  -eating 3 meals daily  -eating protein first, getting >60gm protein daily  -eating slowly, chewing food well  -avoiding/limiting calorie containing beverages  -drinking water 15-30 minutes before or after meals  -choosing wheat, not white with breads, crackers, pastas, leidy, bagels, tortillas, rice  -limiting restaurant or cafeteria eating to twice a week or less    We discussed the importance of restorative sleep and stress management in maintaining a " healthy weight.  We discussed the National Weight Control Registry healthy weight maintenance strategies and ways to optimize metabolism.  We discussed the importance of physical activity including cardiovascular and strength training in maintaining a healthier weight.  We discussed the importance of life-long vitamin supplementation and life-long  follow-up.    Ernestina was reminded that, to avoid marginal ulcers she should avoid tobacco at all, alcohol in excess, caffeine in excess, and NSAIDS (unless indicated for cardioprotection or othewise and opposed by a PPI).    EVITA Mccoy MD  [facility]  2/7/2024  7:00 AM            No images are attached to the encounter.      Virtual Visit Details    Type of service:  Video Visit     Originating Location (pt. Location): Home    Distant Location (provider location):  Off-site  Platform used for Video Visit: [employer]  Video start time: 3:13 pm  Video end time: 3:27 pm      Again, thank you for allowing me to participate in the care of your patient.        Sincerely,        EVITA Mccoy MD

## 2024-02-07 NOTE — PROGRESS NOTES
Virtual Visit Details    Type of service:  Video Visit     Originating Location (pt. Location): Home    Distant Location (provider location):  Off-site  Platform used for Video Visit: [employer]  Video start time: 3:13 pm  Video end time: 3:27 pm

## 2024-02-07 NOTE — PATIENT INSTRUCTIONS
[facility]ariat[facility]  Nutritional Guidelines  RNY 6 Months Post Op    General Guidelines and Helpful Hints:  Eat 3 meals per day + protein supplement(s). No snacks between meals.  Do not skip meals.  This can cause overeating at the next meal and will prevent adequate protein and nutritional intake.  Aim for 60-80 grams of protein per day.   Always eat your protein first. This assists with optimal nutrition and helps you stay full longer.  To achieve daily protein goals, it is recommended to drink approved protein supplement between meals.  Follow appropriate portion size: Use measuring cups to be accurate.    Months Post Op Portion Size per Meal   6 months 1/2 cup   7-8 months 1/2 - 2/3 cup   8 -9 months 2/3 - 3/4 cup   10-12 months 3/4 - 1 cup   12 months and beyond 1 cup maximum     Continue to use saucer/salad plates, infant/toddler silverware to keep portion sizes small and take small bites.  Eat S-L-O-W-L-Y to make each meal last 20-30 minutes. Always stop eating when satisfied.  Continue to use caution with foods containing skins, peels or membranes. Chew well!  Aim for 64 oz. of calorie-free fluids daily.  Continue to avoid caffeine, carbonation and alcohol.  Remember to avoid drinking during meals, 15-30 minutes before and 30 minutes after.  Aim for 30-60 minutes of physical activity most days of the week.  If having trouble tolerating meat, try using a crock-pot, tinfoil tent, steamer or other moist cooking method to create tender meats. Add broth or low-fat gravy to help meat stay moist.   Avoid high sugar and high fat foods to prevent high calorie intake. This will reduce your rate of weight loss.  Check nutrition labels for less than 10 grams of sugar and less than 10 grams of fat per serving.  Continue Taking Vitamins/Minerals:  8531-7559 mcg of Sublingual B-12 daily  1 Multivitamin with Iron twice daily (chewable or swallow tabs)  500-600 mg Calcium Citrate twice daily (chewable or swallow  tabs)  5000 IU Vitamin D3 daily  One iron tablet per day for menstruating females  You may need an additional B complex or thiamine tablet    Sample Grocery List    Protein:  Fat free Greek or light yogurt (less than 10 grams sugar)  Fat free or low-fat cottage cheese  String cheese or reduced fat cheese slices  Tuna, salmon, crab, egg, or chicken salad made with light or fat free mayonnaise  Egg or Egg Substitute  Lean/extra lean turkey, beef, bison, venison (ground, sirloin, round, flank)  Pork loin or tenderloin (grilled, baked, broiled)  Fish such as salmon, tuna, trout, tilapia, etc. (grilled, baked, broiled)  Tender cuts of lean (skinless) turkey or chicken  Lean deli meats: turkey, lean ham, chicken, lean roast beef  Beans such as kidney, garbanzo, black, guerrero, or low-fat/fat free refried beans  Peanut butter (natural preferred). Limit to 1 Tbsp. per day.  Low-fat meatloaf (made with lean ground beef or turkey)  Sloppy Joes made with low-sugar ketchup and lean ground beef or turkey  Soy or vegetable protein (i.e. vegan crumbles, soy/veggie burger, tofu)  Hummus    Vegetables:  Fresh: cooked or raw (as tolerated)  Frozen vegetables  Canned vegetables (low sodium or no salt added, rinse before cooking/eating)  (Ok to have skins/peels/membranes/seeds - just chew well)    Fruits:  Fresh fruit  Frozen fruit (no sugar added)  Canned fruit (packed in its own juice, NOT syrup)  (Ok to have skins/peels/membranes/seeds - just chew well)    Starch:  Unsweetened whole-grain hot cereal (or high fiber cold cereal, dry)  Toasted whole wheat bread or [place] Thins  Whole grain crackers  Baked/boiled/mashed potato/sweet potato  Cooked whole grain pasta, brown rice, or other cooked whole grains  Starchy vegetables: corn, peas, winter squash    Protein Supplement:   Ready to drink protein shake with:  15-30 grams protein per serving  Less than 10 grams total carbohydrate per serving   Protein powder mixed with:   Skim or 1%  milk  Low fat or fat free Lactaid milk, plain or no sugar added soymilk  Water     Fats: (use in moderation)  1 teaspoon of soft tub margarine  1 teaspoon olive oil, canola oil, or peanut oil  1 tablespoon of low-fat dhillon or salad dressing     Sample Menu for 6 months after Gastric Sleeve    You do NOT need to eat/drink the full portion sizes listed below  Always stop when you are satisfied  Breakfast 6 Tbsp. 1% cottage cheese   2 Tbsp. peaches    Lunch 2 oz. lean hamburger or veggie burger  1-2 tsp. salsa   Supplement Approved Protein Shake   (Have between meals throughout the day)   Dinner 6 Tbsp. chicken breast  1-2 Tbsp. green beans     Breakfast 2 Eggs or   cup scrambled egg substitute product   Lunch 7 Tbsp. low-fat Sloppy William mixture   2 high fiber crackers   Supplement Approved Protein Shake   (Have between meals throughout the day)   Dinner 6 Tbsp. grilled, broiled, or baked lemon pepper salmon  2 Tbsp. asparagus     Breakfast 6 Tbsp. light yogurt with 2 Tbsp. Grape Nuts or high fiber cereal    Lunch   cup of chili made with extra lean ground beef and kidney beans   Dinner 5 Tbsp. pork loin made in a crock pot  2 Tbsp. cooked broccoli  1 Tbsp. baked potato with 2 sprays of spray margarine    Supplement Approved Protein Shake   (Have between meals throughout the day)     Breakfast 6 Tbsp. lean ham  2 Tbsp. seedless melon   Lunch 7 Tbsp. diced turkey with 1 teaspoon low-fat gravy  1 Tbsp. green beans   Dinner 6 Tbsp. extra lean ground beef mixed with low sugar spaghetti sauce  2 Tbsp. whole wheat pasta   Supplement Approved Protein Shake   (Have between meals throughout the day)     Breakfast 2 oz turkey or soy based sausage svitlana    Lunch 6 Tbsp. low-fat cottage cheese  2 Tbsp. pineapple   Supplement Approved Protein Shake   (Have between meals throughout the day)   Dinner 7 Tbsp. beef tenderloin  1 Tbsp. asparagus     Breakfast 1/2 of a whole wheat English Muffin (toasted)  1 Tbsp. creamy natural peanut  butter   Lunch   cup of black bean soup with 1 Tbsp. low fat shredded cheese   Dinner 7 Tbsp. low-fat turkey meat loaf   1 Tbsp. cooked carrots   Supplement Approved Protein Shake   (Have between meals throughout the day)     Breakfast 6 Tbsp. scrambled egg or scrambled egg substitute  1 Tbsp. finely chopped bell pepper  1 Tbsp. low fat shredded cheese   Lunch 7 Tbsp. lean diced ham  1 Tbsp. mandarin oranges   Supplement Approved Protein Shake   (Have between meals throughout the day)   Dinner 6 Tbsp. flaked fish   2 Tbsp. mashed sweet potato

## 2024-02-07 NOTE — PROGRESS NOTES
Bariatric Care Clinic Follow Up Visit for Previous Bariatric Surgery   Date of visit: 2/7/2024  Physician: EVITA Mccoy MD, MD  Primary Care is Deann Luciano  Ernestina Merlos   33 year old  female    Date of Surgery: 8/22/23  Initial Weight: 328#  Initial BMI: 52.9  Today's Weight:   Wt Readings from Last 1 Encounters:   02/07/24 112.9 kg (249 lb)     Body mass index is 40.19 kg/m .       Assessment and Plan   Assessment: Ernestina is a 33 year old year old female who is 6 months s/p RNY gastric bypass with Dr. Kim. They have had a durable weight loss of 79 lbs since surgery.  Overall compliance with the [facility] Program has been excellent.      Plan:    1. Postoperative malabsorption  Patient is taking all vitamins as directed.  Recent routine postoperative labs were normal. She was reminded to slow down, chew foods thoroughly, and to avoid liquids within 30 minutes of solids. Written information was given. She was congratulated on her success thus far.     2. Morbid obesity (H)  Patient was congratulated on her success thus far. Healthy habits to assist with further weight loss were discussed. She will try to add some weight training to keep up her muscle mass and therefore her metabolism.       Total time spent on the date of this encounter doing: chart review, review of test results, patient visit, physical exam, education, counseling, developing plan of care and documenting = 33 minutes.      Bariatric Surgery Review   Interim History/LifeChanges: She is struggling with seeing her weight loss when she looks at herself. It is easier for her to do everyday activities. She no longer has knee pain.      Patient Concerns: some hair loss    GERD : gone away    Medication changes: stopped ursodiol and omeprazole    Vitamin Intake:   Multivitamin   Twice a day with iron   Vitamin D  5000 iU   Calcium  Citrate once a day   Vit. B-12    SL     Habits:            Alcohol Intake  occasional  "  NSAID Use  no   Caffeine Use  [employer] shaken expresso a couple of times a week   Exercise  Walking, stairs, 30 minutes 3-4 x per week   CPAP Use:  No, sleep apnea resolved with tonsil and adenoid removal   Birth Control  Nexplanon removed right after surgery, now has IUD   Tobacco Use     none                                      LABS: reviewed, normal      LABS:  Lab Results   Component Value Date    WBC 6.5 01/30/2024    HGB 13.9 01/30/2024    HCT 41.4 01/30/2024    MCV 98 01/30/2024     01/30/2024      No components found for: \"SYWBPFIF97SE\" No results found for: \"HGBA1C\"   Lab Results   Component Value Date    CHOL 136 01/30/2024    No components found for: \"PTH\"      No components found for: \"FERRITIN\"   Lab Results   Component Value Date    HDL 50 01/30/2024      No components found for: \"[id number]\" No components found for: \"01658\"   No components found for: \"LDLCALC\" Lab Results   Component Value Date    TSH 1.58 04/04/2023    No components found for: \"FOLATE\"   Lab Results   Component Value Date    TRIG 78 01/30/2024    Lab Results   Component Value Date    ALT 27 01/30/2024    AST 21 01/30/2024    ALKPHOS 96 01/30/2024    No results found for: \"TESTOSTERONE\"     No results found for: \"CHOLHDL\" No components found for: \"7597\"   @resusfast(vitamin a: 1)@             Patient Profile   Social History     Social History Narrative    Not on file        Past Medical History   Past Medical History:   Diagnosis Date    ADHD     Anxiety     Arthritis     Chicken pox 01/01/1997    Chronic bronchitis (H)     Depression     Dysmenorrhea     GERD (gastroesophageal reflux disease)     Hiatal hernia     Microscopic colitis     Panic attack     Raynaud disease     Shortness of breath     Sleep apnea     uses CPAP    Vitamin D deficiency      Patient Active Problem List   Diagnosis    Morbid obesity (H)    Calculus of gallbladder without cholecystitis without obstruction    Gastroesophageal reflux disease without " "esophagitis    Psoriatic arthritis (H)    Morbid (severe) obesity due to excess calories (H)     [unfilled]    Past Surgical History  She has a past surgical history that includes Us Aspiration Hematoma Seroma Or Fluid Collection (03/12/2020); LAP,CHOLECYSTECTOMY/EXPLORE (N/A, 04/28/2021); arthoscopy knee (10/20/2020); Uvulopalatopharyngoplasty (09/24/2021); and Creation, Gastric Bypass, Jesse-En-Y, Laparoscopic (N/A, 8/22/2023).     Examination   Ht 1.676 m (5' 6\")   Wt 112.9 kg (249 lb)   BMI 40.19 kg/m      Wt Readings from Last 4 Encounters:   02/07/24 112.9 kg (249 lb)   09/01/23 135.6 kg (299 lb)   08/22/23 140.6 kg (310 lb)   08/02/23 (!) 150.6 kg (332 lb)      GENERAL: alert and no distress  EYES: Eyes grossly normal to inspection.  No discharge or erythema, or obvious scleral/conjunctival abnormalities.  RESP: No audible wheeze, cough, or visible cyanosis.    SKIN: Visible skin clear. No significant rash, abnormal pigmentation or lesions.  NEURO: Cranial nerves grossly intact.  Mentation and speech appropriate for age.  PSYCH: Appropriate affect, tone, and pace of words          Counseling:   We reviewed the important post op bariatric recommendations:  -eating 3 meals daily  -eating protein first, getting >60gm protein daily  -eating slowly, chewing food well  -avoiding/limiting calorie containing beverages  -drinking water 15-30 minutes before or after meals  -choosing wheat, not white with breads, crackers, pastas, leidy, bagels, tortillas, rice  -limiting restaurant or cafeteria eating to twice a week or less    We discussed the importance of restorative sleep and stress management in maintaining a healthy weight.  We discussed the National Weight Control Registry healthy weight maintenance strategies and ways to optimize metabolism.  We discussed the importance of physical activity including cardiovascular and strength training in maintaining a healthier weight.  We discussed the importance of " life-long vitamin supplementation and life-long  follow-up.    Ernestina was reminded that, to avoid marginal ulcers she should avoid tobacco at all, alcohol in excess, caffeine in excess, and NSAIDS (unless indicated for cardioprotection or othewise and opposed by a PPI).    EVITA Mccoy MD  [facility]  2/7/2024  7:00 AM            No images are attached to the encounter.

## 2024-05-22 ENCOUNTER — VIRTUAL VISIT (OUTPATIENT)
Dept: SURGERY | Facility: CLINIC | Age: 34
End: 2024-05-22
Payer: COMMERCIAL

## 2024-05-22 DIAGNOSIS — K90.9 INTESTINAL MALABSORPTION, UNSPECIFIED TYPE: ICD-10-CM

## 2024-05-22 DIAGNOSIS — Z98.84 HISTORY OF ROUX-EN-Y GASTRIC BYPASS: ICD-10-CM

## 2024-05-22 DIAGNOSIS — K91.2 POSTOPERATIVE MALABSORPTION: ICD-10-CM

## 2024-05-22 DIAGNOSIS — E66.9 OBESITY (BMI 30-39.9): Primary | ICD-10-CM

## 2024-05-22 PROCEDURE — 97803 MED NUTRITION INDIV SUBSEQ: CPT | Mod: 95 | Performed by: DIETITIAN, REGISTERED

## 2024-05-22 NOTE — PATIENT INSTRUCTIONS
[facility] Bariatric [facility]  Nutritional Guidelines  Gastric Bypass 9 Months Post Op    General Guidelines and Helpful Hints:  Eat 3 meals per day + protein supplement(s). No snacks between meals.  Do not skip meals.  This can cause overeating at the next meal and will prevent adequate protein and nutritional intake.  Aim for 60-80 grams of protein per day.  Always eat your protein first. This assists with optimal nutrition and helps you stay full longer.  Depending on your portion size, you may need to drink approved protein supplement between meals to achieve protein goals. Follow recommendations of your Dietitian.   Follow appropriate portion size: Use measuring cups to be accurate.  Months Post Op Portion Size per Meal   9 months   cup   10-12 months  - 1 cup   12 months and beyond 1 cup maximum     Continue to use saucer/salad plates, infant/toddler silverware to keep portion sizes small and take small bites.  Eat S-L-O-W-L-Y to make each meal last 20-30 minutes. Always stop eating when satisfied.  Continue to use caution with foods containing skins, peels or membranes. Chew well!  Aim for 64 oz. of calorie-free fluids daily.  Continue to avoid caffeine, carbonation and alcohol.  Remember to avoid drinking during meals, 15-30 minutes before and 30 minutes after.  Aim for 30-60 minutes of physical activity most days of the week.  If having trouble tolerating meat, try using a crock-pot, tinfoil tent, steamer or other moist cooking method to create tender meats. Add broth or low-fat gravy to help meat stay moist.   Avoid high sugar and high fat foods to prevent dumping syndrome.  Check nutrition labels for less than 10 grams of sugar and less than 10 grams of fat per serving.  Continue Taking Vitamins/Minerals:  6372-5683 mcg of Sublingual B-12 daily  1 Multivitamin with Iron twice daily (chewable or swallow tabs)  500-600 mg Calcium Citrate twice daily (chewable or swallow tabs)  5000 IU Vitamin D3  daily    Grocery List  Protein:  Fat free Greek or light yogurt (less than 10 grams sugar)  Fat free or low-fat cottage cheese  String cheese or reduced fat cheese slices  Tuna, salmon, crab, egg, or chicken salad made with light or fat free mayonnaise  Egg or Egg Substitute  Lean/extra lean turkey, beef, bison, venison (ground, sirloin, round, flank)  Pork loin or tenderloin (grilled, baked, broiled)  Fish such as salmon, tuna, trout, tilapia, etc. (grilled, baked, broiled)  Tender cuts of lean (skinless) turkey or chicken  Lean deli meats: turkey, lean ham, chicken, lean roast beef  Beans such as kidney, garbanzo, black, guerrero, or low-fat/fat free refried beans  Peanut butter (natural preferred). Limit to 1 Tbsp. per day.  Low-fat meatloaf (made with lean ground beef or turkey)  Sloppy Joes made with low-sugar ketchup and lean ground beef or turkey  Soy or vegetable protein (i.e. vegan crumbles, soy/veggie burger, tofu)  Hummus    Vegetables:  Fresh: cooked or raw (as tolerated)  Frozen vegetables  Canned vegetables (low sodium or no salt added, rinse before cooking/eating)  (Ok to have skins/peels/membranes/seeds - just chew well)    Fruits:  Fresh fruit  Frozen fruit (no sugar added)  Canned fruit (packed in its own juice, NOT syrup)  (Ok to have skins/peels/membranes/seeds - just chew well)    Starch:  Unsweetened whole-grain hot cereal (or high fiber cold cereal, dry)  Toasted whole wheat bread or [place] Thins  Whole grain crackers  Baked/boiled/mashed potato/sweet potato  Cooked whole grain pasta, brown rice, or other cooked whole grains  Starchy vegetables: corn, peas, winter squash    Protein Supplement:   Ready to drink protein shake with:  15-30 grams protein per serving  Less than 10 grams total carbohydrate per serving   Protein powder mixed with:   Skim or 1% milk  Low fat or fat free Lactaid milk, plain or no sugar added soymilk  Water   Fats: (use in moderation)  1 teaspoon of soft tub margarine  1  teaspoon olive oil, canola oil, or peanut oil  1 tablespoon of low-fat dhillon or salad dressing    Sample Menu for 9 months after Gastric Bypass    You do NOT need to eat/drink the full portion sizes listed below  Always stop when you are satisfied  Breakfast 3  diameter whole-wheat waffle with 1 Tbsp. of peanut butter  1 string cheese    Lunch   cup low-fat tuna salad (made with light mayonnaise)  3-4 small strips red pepper   Supplement Approved Protein Supplement  (Have between meals throughout the day)   Dinner   cup low-fat chicken stew      Breakfast   cup egg substitute, scrambled     cup sautéed vegetables in olive oil   Lunch   cup low fat or fat free cottage cheese  4-5  whole wheat crackers   Supplement Approved Protein Supplement   (Have between meals throughout the day)   Dinner 2 oz  grilled, broiled, or baked lemon pepper salmon    cup grilled asparagus     Breakfast   cup diced lean ham    cup mandarin oranges (canned in light juice)    Lunch 2 oz lean turkey lunchmeat    cup fresh tomatoes   Dinner 2 oz pork loin made in a crock pot seasoned with a spice rub    cup unsweetened applesauce   Supplement Approved Protein Supplement  (Have between meals throughout the day)     Breakfast   cup breakfast casserole made with egg substitute & turkey sausage    cup seedless melon   Lunch 2 oz  skinless chicken sautéed in 1 tsp. olive oil and herb seasonings    cup sliced zucchini   Dinner   cup chili made with ground turkey/sirloin or veggie crumbles and beans   3 whole-wheat saltine crackers   Supplement Approved Protein Supplement  (Have between meals throughout the day)     Breakfast 2 Tbsp. whole-grain cereal with   cup plain Greek yogurt   2 Tbsp. fresh berries   Lunch   cup crab mixed with 1-2 tsp. low-fat mayonnaise  4-5 Wheat Thins   Supplement Approved Protein Supplement   (Have between meals throughout the day)   Dinner 2 oz. tender turkey breast (made in crock pot for extra moisture)  2 Tbsp. green  beans  2 Tbsp. sweet potato     Breakfast   whole wheat English muffin, toasted  1 Tbsp. natural peanut butter    cup flavored light Greek yogurt   Lunch 3/4 cup black bean soup   Dinner 2 oz low-fat turkey meat loaf   2 Tbsp. mashed potato   2 Tbsp. cooked carrots   Supplement Approved Protein Supplement   (Have between meals throughout the day)     Breakfast   cup egg substitute  2 Tbsp. chopped bell pepper or mushrooms  2 Tbsp. kiwi   Lunch   cup canned chicken (in water) mixed with light mayonnaise  4-5 Wheat Thins    Supplement Approved Protein Supplement   (Have between meals throughout the day)   Dinner 6 medium shrimp  1 Tbsp. cocktail sauce  3 Tbsp. green beans

## 2024-05-22 NOTE — LETTER
"    5/22/2024         RE: Ernestina Merlos  [address]  [facility] [address]        Dear Colleague,    Thank you for referring your patient, Ernestina Merlos, to the [facility] AND [facility] [place]. Please see a copy of my visit note below.    Ernestina Merlos is a 34 year old who is being evaluated via a billable video visit.      How would you like to obtain your AVS? MyChart  If the video visit is dropped, the invitation should be resent by: Text to cell phone: [phone]  Will anyone else be joining your video visit? No  {If patient encounters technical issues they should call [phone]      Post-op Surgical Weight Loss Diet Evaluation     Assessment:  Pt presents for  9 months  post-op RD visit, s/p RNY on 822/2023 with Dr. Kim. Today we reviewed current eating habits and level of physical activity, and instructed on the changes that are required for successful bariatric outcomes.    Patient Progress: Overall, patient expressed that things have been going well nutritionally. Patient reported issues with getting enough protein and with making meals for herself. Discussed different protein options and simple meal options. Patient has been trying to increase activity and completing weight lifting during the week.     Initial weight: 328 lbs  Current weight: 240 lbs  Weight change: 88 lbs, 26.8% down    BMI: 38.74    Patient Active Problem List   Diagnosis     Morbid obesity (H)     Calculus of gallbladder without cholecystitis without obstruction     Gastroesophageal reflux disease without esophagitis     Psoriatic arthritis (H)     Morbid (severe) obesity due to excess calories (H)       Diabetes: no    Vitamins   Multi Vit with Iron: yes  Calcium Citrate: yes  B12: yes  D3: yes    Do you experience hunger? no  Do you have \"dumping\" syndrome? no   Do you experience any reflux or discomfort with eating? 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 Patient has been trying to be mindful of waiting.     Fluid Intake  Water: 64 ounces  Caffeine: coffee 4x/week  Alcohol: 1-2 glasses/week  Carbonation: occasionally    Exercise:   15-30 minute walk daily  Weight lifting 4-5x/week      PES statement:      (NC-1.4) Altered GI Function related to Alteration in gastrointestinal tract structure and/or function/ Decreased functional length of the GI tract as evidenced by Weight loss of 26.8% initial body weight; Gastric bypass surgery    (NB-1.7) Undesirable food choices related to Failure to adjust for lifestyle changes as evidenced by low protein intake at meals; large portions; skipping meals; frequent grazing;  mindless eating ; drinking with meals, not chewing each bite to applesauce consistency; consuming caffeine/carbonation daily, frequent restaurant intake; and no structured activity regimen    (NI-5.7.1) Inadequate protein intake related to Gastric bypass causing increased nutrient needs due to malabsorption/ Decreased ability to consume sufficient protein as evidenced by thin and fragile hair; and Estimated intake of protein insufficient to meet requirements    Intervention    Discussion  Discussed 9 Month Post-Op Nutritional Guidelines for RNY  Recommended to consume 15-20gm protein at 3 meals daily, along with protein supplement/\"planned protein containing snack\" of 15-30gm " "protein, to reach goal of 60-80 gm protein daily.  Educated on post-op vitamin regimen: Multi Vit + iron 2x/day, calcium citrate 400-600 mg 2x/day, 5402-8864 mcg of Sublingual B-12 daily, and 5000 IU Vitamin D3 daily (MVI and calcium can be taken at the same time BID)  Reviewed lean protein sources  Bariatric Plate Method-  including lean/low fat protein at each meal, including a vegetable/fruit, and limiting carbohydrate intake to less than 25% of plate volume.     Instructions  Include 15-20gm protein at each meal, along with protein supplement/\"planned protein containing snack\" of 15-30gm protein, to reach goal of 60-80 gm protein daily.  Increase fluid intake to 64oz daily: choose plain or calorie/carbonation-free beverages.  Incorporate daily structured activity, 30-60 minutes most days of the week  Recommended pt to start taking: Multi Vit + iron 2x/day, calcium citrate 400-600 mg 2x/day, 3428-2954 mcg of Sublingual B-12 daily, and 5000 IU Vitamin D3 daily. (MVI and calcium can be taken at the same time)  Read food labels more consistently: keeping total fat grams <10, total sugar grams <10, fiber >3gm per serving.  Increase vegetable/fruit intake, by having a vegetable or fruit with each meal daily.  Practice plate method: 1/2 plate lean/low fat protein source, vegetable/fruit, <25% of plate complex carbohydrates.  Separate fluids 30 minutes before/after meal times.  Practice eating off of smaller plates/bowls, chewing to applesauce consistency, taking 20-30 minutes to eat in a calm/relaxed environment without distractions of tv/email/cell phone.    Handouts provided:  9 Months  Post-Op Nutritional Guidelines for RNY    Assessment/Plan:    Pt to follow up for 1 year post-op visit with bariatrician       Video-Visit Details    Type of service:  Video Visit    Video Start Time (time video started):  9:59 AM    Video End Time (time video stopped): 10:15 AM    Originating Location (pt. Location): " Home      Distant Location (provider location):  Off-site    Mode of Communication:  Video Conference via [facility]    Physician has received verbal consent for a Video Visit from the patient? Yes    Shanna Naidu RD             Again, thank you for allowing me to participate in the care of your patient.        Sincerely,        Shanna Naidu RD

## 2024-05-22 NOTE — PROGRESS NOTES
"Ernestina Merlos is a 34 year old who is being evaluated via a billable video visit.      How would you like to obtain your AVS? MyChart  If the video visit is dropped, the invitation should be resent by: Text to cell phone: [phone]  Will anyone else be joining your video visit? No  {If patient encounters technical issues they should call [phone]      Post-op Surgical Weight Loss Diet Evaluation     Assessment:  Pt presents for  9 months  post-op RD visit, s/p RNY on 822/2023 with Dr. Kim. Today we reviewed current eating habits and level of physical activity, and instructed on the changes that are required for successful bariatric outcomes.    Patient Progress: Overall, patient expressed that things have been going well nutritionally. Patient reported issues with getting enough protein and with making meals for herself. Discussed different protein options and simple meal options. Patient has been trying to increase activity and completing weight lifting during the week.     Initial weight: 328 lbs  Current weight: 240 lbs  Weight change: 88 lbs, 26.8% down    BMI: 38.74    Patient Active Problem List   Diagnosis    Morbid obesity (H)    Calculus of gallbladder without cholecystitis without obstruction    Gastroesophageal reflux disease without esophagitis    Psoriatic arthritis (H)    Morbid (severe) obesity due to excess calories (H)       Diabetes: no    Vitamins   Multi Vit with Iron: yes  Calcium Citrate: yes  B12: yes  D3: yes    Do you experience hunger? no  Do you have \"dumping\" syndrome? no   Do you experience any reflux or discomfort with eating? 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 Patient has been trying to be mindful of waiting.     Fluid Intake  Water: 64 ounces  Caffeine: coffee 4x/week  Alcohol: 1-2 glasses/week  Carbonation: occasionally    Exercise:   15-30 minute walk daily  Weight lifting 4-5x/week      PES statement:      (NC-1.4) Altered GI Function related to Alteration in gastrointestinal tract structure and/or function/ Decreased functional length of the GI tract as evidenced by Weight loss of 26.8% initial body weight; Gastric bypass surgery    (NB-1.7) Undesirable food choices related to Failure to adjust for lifestyle changes as evidenced by low protein intake at meals; large portions; skipping meals; frequent grazing;  mindless eating ; drinking with meals, not chewing each bite to applesauce consistency; consuming caffeine/carbonation daily, frequent restaurant intake; and no structured activity regimen    (NI-5.7.1) Inadequate protein intake related to Gastric bypass causing increased nutrient needs due to malabsorption/ Decreased ability to consume sufficient protein as evidenced by thin and fragile hair; and Estimated intake of protein insufficient to meet requirements    Intervention    Discussion  Discussed 9 Month Post-Op Nutritional Guidelines for RNY  Recommended to consume 15-20gm protein at 3 meals daily, along with protein supplement/\"planned protein containing snack\" of 15-30gm protein, to reach goal of 60-80 gm protein daily.  Educated on post-op vitamin regimen: Multi Vit + iron 2x/day, calcium citrate 400-600 mg 2x/day, 0754-7133 mcg of Sublingual B-12 daily, and 5000 IU Vitamin D3 daily (MVI and calcium can be taken at the same time BID)  Reviewed lean " "protein sources  Bariatric Plate Method-  including lean/low fat protein at each meal, including a vegetable/fruit, and limiting carbohydrate intake to less than 25% of plate volume.     Instructions  Include 15-20gm protein at each meal, along with protein supplement/\"planned protein containing snack\" of 15-30gm protein, to reach goal of 60-80 gm protein daily.  Increase fluid intake to 64oz daily: choose plain or calorie/carbonation-free beverages.  Incorporate daily structured activity, 30-60 minutes most days of the week  Recommended pt to start taking: Multi Vit + iron 2x/day, calcium citrate 400-600 mg 2x/day, 0697-7396 mcg of Sublingual B-12 daily, and 5000 IU Vitamin D3 daily. (MVI and calcium can be taken at the same time)  Read food labels more consistently: keeping total fat grams <10, total sugar grams <10, fiber >3gm per serving.  Increase vegetable/fruit intake, by having a vegetable or fruit with each meal daily.  Practice plate method: 1/2 plate lean/low fat protein source, vegetable/fruit, <25% of plate complex carbohydrates.  Separate fluids 30 minutes before/after meal times.  Practice eating off of smaller plates/bowls, chewing to applesauce consistency, taking 20-30 minutes to eat in a calm/relaxed environment without distractions of tv/email/cell phone.    Handouts provided:  9 Months  Post-Op Nutritional Guidelines for RNY    Assessment/Plan:    Pt to follow up for 1 year post-op visit with bariatrician       Video-Visit Details    Type of service:  Video Visit    Video Start Time (time video started):  9:59 AM    Video End Time (time video stopped): 10:15 AM    Originating Location (pt. Location): Home      Distant Location (provider location):  Off-site    Mode of Communication:  Video Conference via [facility]    Physician has received verbal consent for a Video Visit from the patient? Yes    Shanna Naidu RD         "

## 2024-07-23 ENCOUNTER — MYC MEDICAL ADVICE (OUTPATIENT)
Dept: SURGERY | Facility: CLINIC | Age: 34
End: 2024-07-23
Payer: COMMERCIAL

## 2024-07-23 DIAGNOSIS — K90.9 INTESTINAL MALABSORPTION, UNSPECIFIED TYPE: Primary | ICD-10-CM

## 2024-07-23 DIAGNOSIS — Z98.84 HISTORY OF ROUX-EN-Y GASTRIC BYPASS: ICD-10-CM

## 2024-07-23 DIAGNOSIS — K91.2 POSTOPERATIVE MALABSORPTION: ICD-10-CM

## 2024-07-23 NOTE — TELEPHONE ENCOUNTER
Eddie Casper, MA  P Bariatric [facility]  1 year po visit with  on 08/19- annual labs need to be placed. Please and thank you!

## 2024-08-09 ENCOUNTER — LAB (OUTPATIENT)
Dept: LAB | Facility: HOSPITAL | Age: 34
End: 2024-08-09
Payer: COMMERCIAL

## 2024-08-09 DIAGNOSIS — K90.9 INTESTINAL MALABSORPTION, UNSPECIFIED TYPE: ICD-10-CM

## 2024-08-09 DIAGNOSIS — K91.2 POSTOPERATIVE MALABSORPTION: ICD-10-CM

## 2024-08-09 DIAGNOSIS — Z98.84 HISTORY OF ROUX-EN-Y GASTRIC BYPASS: ICD-10-CM

## 2024-08-09 LAB
ALBUMIN SERPL BCG-MCNC: 4.3 G/DL (ref 3.5–5.2)
ALP SERPL-CCNC: 110 U/L (ref 40–150)
ALT SERPL W P-5'-P-CCNC: 31 U/L (ref 0–50)
ANION GAP SERPL CALCULATED.3IONS-SCNC: 11 MMOL/L (ref 7–15)
AST SERPL W P-5'-P-CCNC: 23 U/L (ref 0–45)
BILIRUB SERPL-MCNC: 0.4 MG/DL
BUN SERPL-MCNC: 8.4 MG/DL (ref 6–20)
CALCIUM SERPL-MCNC: 9 MG/DL (ref 8.8–10.4)
CHLORIDE SERPL-SCNC: 103 MMOL/L (ref 98–107)
CREAT SERPL-MCNC: 0.87 MG/DL (ref 0.51–0.95)
EGFRCR SERPLBLD CKD-EPI 2021: 90 ML/MIN/1.73M2
ERYTHROCYTE [DISTWIDTH] IN BLOOD BY AUTOMATED COUNT: 12.4 % (ref 10–15)
FERRITIN SERPL-MCNC: 94 NG/ML (ref 6–175)
FOLATE SERPL-MCNC: 7.1 NG/ML (ref 4.6–34.8)
GLUCOSE SERPL-MCNC: 83 MG/DL (ref 70–99)
HBA1C MFR BLD: 5.2 %
HCO3 SERPL-SCNC: 26 MMOL/L (ref 22–29)
HCT VFR BLD AUTO: 41.8 % (ref 35–47)
HGB BLD-MCNC: 13.7 G/DL (ref 11.7–15.7)
MCH RBC QN AUTO: 32.7 PG (ref 26.5–33)
MCHC RBC AUTO-ENTMCNC: 32.8 G/DL (ref 31.5–36.5)
MCV RBC AUTO: 100 FL (ref 78–100)
PLATELET # BLD AUTO: 229 10E3/UL (ref 150–450)
POTASSIUM SERPL-SCNC: 3.9 MMOL/L (ref 3.4–5.3)
PROT SERPL-MCNC: 7.3 G/DL (ref 6.4–8.3)
PTH-INTACT SERPL-MCNC: 98 PG/ML (ref 15–65)
RBC # BLD AUTO: 4.19 10E6/UL (ref 3.8–5.2)
SODIUM SERPL-SCNC: 140 MMOL/L (ref 135–145)
VIT B12 SERPL-MCNC: 843 PG/ML (ref 232–1245)
VIT D+METAB SERPL-MCNC: 45 NG/ML (ref 20–50)
WBC # BLD AUTO: 6.3 10E3/UL (ref 4–11)

## 2024-08-09 PROCEDURE — 84590 ASSAY OF VITAMIN A: CPT

## 2024-08-09 PROCEDURE — 84425 ASSAY OF VITAMIN B-1: CPT

## 2024-08-09 PROCEDURE — 82607 VITAMIN B-12: CPT

## 2024-08-09 PROCEDURE — 80053 COMPREHEN METABOLIC PANEL: CPT

## 2024-08-09 PROCEDURE — 84630 ASSAY OF ZINC: CPT

## 2024-08-09 PROCEDURE — 36415 COLL VENOUS BLD VENIPUNCTURE: CPT

## 2024-08-09 PROCEDURE — 83036 HEMOGLOBIN GLYCOSYLATED A1C: CPT

## 2024-08-09 PROCEDURE — 85027 COMPLETE CBC AUTOMATED: CPT

## 2024-08-09 PROCEDURE — 83970 ASSAY OF PARATHORMONE: CPT

## 2024-08-09 PROCEDURE — 82306 VITAMIN D 25 HYDROXY: CPT

## 2024-08-09 PROCEDURE — 82746 ASSAY OF FOLIC ACID SERUM: CPT

## 2024-08-09 PROCEDURE — 82728 ASSAY OF FERRITIN: CPT

## 2024-08-12 LAB
ANNOTATION COMMENT IMP: NORMAL
RETINYL PALMITATE SERPL-MCNC: 0.05 MG/L
VIT A SERPL-MCNC: 0.5 MG/L
ZINC SERPL-MCNC: 72 UG/DL

## 2024-08-15 LAB — VIT B1 PYROPHOSHATE BLD-SCNC: 135 NMOL/L

## 2024-08-15 NOTE — PROGRESS NOTES
Bariatric Care Clinic Follow Up Visit for Previous Bariatric Surgery   Date of visit: 8/19/2024  Physician: EVITA Mccoy MD, MD  Primary Care is Deann Luciano  Ernestina Merlos   34 year old  female    Date of Surgery: 8/22/23  Initial Weight: 328#  Initial BMI: 52.9  Today's Weight:   Wt Readings from Last 1 Encounters:   08/19/24 107 kg (236 lb)     Body mass index is 38.09 kg/m .       Assessment and Plan   Assessment: Ernestina is a 34 year old year old female who is 1 year s/p RNY gastric bypass with Dr. Kim. They have had a durable weight loss of 92 lbs since surgery.  Overall compliance with the [facility] has been excellent.      Plan:    1. Postoperative malabsorption  Patient is taking all vitamins as directed.  Recent routine postoperative labs were reviewed. She will try to get 2 calcium tablets in daily.   She is aware that she should wait until she is 2 years post op to attempt pregnancy. She was reminded to slow down, chew foods thoroughly, and to avoid liquids within 30 minutes of solids. Written information was given. She was congratulated on her success thus far.          Total time spent on the date of this encounter doing: chart review, review of test results, patient visit, physical exam, education, counseling, developing plan of care and documenting = 33 minutes.      Bariatric Surgery Review       Patient Concerns: random waves of extreme fatigue, often associated with a hot flash, once a month, she is talking about getting pregnant with her partner    Medication changes: no recent    Meals: B: (late) leftovers or sandwich  D: protein and starch or vegetables  Snack: protein shake throughout the day    Vitamin Intake:   Multivitamin   2 per day   Vitamin D  5000 IU   Calcium  One pill a couple of times a week   Vit. B-12    SL     Soda: 8-16 oz every 4-5 days    Habits:            Alcohol Intake  occasional   NSAID Use  none   Caffeine Use  occasional  "  Exercise  Walking 3-4 x per week for 30 minutes   CPAP Use:  Not needed   Birth Control  Mirena IUD   Tobacco Use     none                                      LABS: PTH high, o/w nl      LABS:  Lab Results   Component Value Date    WBC 6.3 08/09/2024    HGB 13.7 08/09/2024    HCT 41.8 08/09/2024     08/09/2024     08/09/2024      No components found for: \"[id number]\" No results found for: \"HGBA1C\"   Lab Results   Component Value Date    CHOL 136 01/30/2024    No components found for: \"PTH\"      No components found for: \"FERRITIN\"   Lab Results   Component Value Date    HDL 50 01/30/2024      No components found for: \"SFAOYSRW09\" No components found for: \"39533\"   No components found for: \"LDLCALC\" Lab Results   Component Value Date    TSH 1.58 04/04/2023    No components found for: \"FOLATE\"   Lab Results   Component Value Date    TRIG 78 01/30/2024    Lab Results   Component Value Date    ALT 31 08/09/2024    AST 23 08/09/2024    ALKPHOS 110 08/09/2024    No results found for: \"TESTOSTERONE\"     No results found for: \"CHOLHDL\" No components found for: \"7597\"   @[facility](vitamin a: 1)@             Patient Profile   Social History     Social History Narrative    Not on file        Past Medical History   Past Medical History:   Diagnosis Date    ADHD     Anxiety     Arthritis     Chicken pox 01/01/1997    Chronic bronchitis (H)     Depression     Dysmenorrhea     GERD (gastroesophageal reflux disease)     Hiatal hernia     Microscopic colitis     Panic attack     Raynaud disease     Shortness of breath     Sleep apnea     uses CPAP    Vitamin D deficiency      Patient Active Problem List   Diagnosis    Morbid obesity (H)    Calculus of gallbladder without cholecystitis without obstruction    Gastroesophageal reflux disease without esophagitis    Psoriatic arthritis (H)    Morbid (severe) obesity due to excess calories (H)     [unfilled]    Past Surgical History  She has a past surgical " history that includes Us Aspiration Hematoma Seroma Or Fluid Collection (03/12/2020); LAP,CHOLECYSTECTOMY/EXPLORE (N/A, 04/28/2021); arthoscopy knee (10/20/2020); Uvulopalatopharyngoplasty (09/24/2021); and Creation, Gastric Bypass, Jesse-[id number], Laparoscopic (N/A, 8/22/2023).     Examination   Wt 107 kg (236 lb)   BMI 38.09 kg/m      Wt Readings from Last 4 Encounters:   08/19/24 107 kg (236 lb)   02/07/24 112.9 kg (249 lb)   09/01/23 135.6 kg (299 lb)   08/22/23 140.6 kg (310 lb)      BP Readings from Last 3 Encounters:   08/23/23 112/70   06/22/23 (!) 142/90   04/04/23 136/80      GENERAL: alert and no distress  EYES: Eyes grossly normal to inspection.  No discharge or erythema, or obvious scleral/conjunctival abnormalities.  RESP: No audible wheeze, cough, or visible cyanosis.    SKIN: Visible skin clear. No significant rash, abnormal pigmentation or lesions.  NEURO: Cranial nerves grossly intact.  Mentation and speech appropriate for age.  PSYCH: Appropriate affect, tone, and pace of words          Counseling:   We reviewed the important post op bariatric recommendations:  -eating 3 meals daily  -eating protein first, getting >60gm protein daily  -eating slowly, chewing food well  -avoiding/limiting calorie containing beverages  -drinking water 15-30 minutes before or after meals  -choosing wheat, not white with breads, crackers, pastas, leidy, bagels, tortillas, rice  -limiting restaurant or cafeteria eating to twice a week or less    We discussed the importance of restorative sleep and stress management in maintaining a healthy weight.  We discussed the National Weight Control Registry healthy weight maintenance strategies and ways to optimize metabolism.  We discussed the importance of physical activity including cardiovascular and strength training in maintaining a healthier weight.  We discussed the importance of life-long vitamin supplementation and life-long  follow-up.    Ernestina was reminded that, to  avoid marginal ulcers she should avoid tobacco at all, alcohol in excess, caffeine in excess, and NSAIDS (unless indicated for cardioprotection or othewise and opposed by a PPI).    EVITA Mccoy MD  [facility]  8/15/2024  12:05 PM            No images are attached to the encounter.

## 2024-08-19 ENCOUNTER — VIRTUAL VISIT (OUTPATIENT)
Dept: SURGERY | Facility: CLINIC | Age: 34
End: 2024-08-19
Payer: COMMERCIAL

## 2024-08-19 VITALS — BODY MASS INDEX: 37.93 KG/M2 | HEIGHT: 66 IN | WEIGHT: 236 LBS

## 2024-08-19 DIAGNOSIS — K91.2 POSTOPERATIVE MALABSORPTION: Primary | ICD-10-CM

## 2024-08-19 PROCEDURE — 99214 OFFICE O/P EST MOD 30 MIN: CPT | Mod: 95 | Performed by: FAMILY MEDICINE

## 2024-08-19 RX ORDER — TRAZODONE HYDROCHLORIDE 100 MG/1
TABLET ORAL
COMMUNITY
Start: 2024-08-12

## 2024-08-19 ASSESSMENT — PAIN SCALES - GENERAL: PAINLEVEL: NO PAIN (0)

## 2024-08-19 NOTE — PROGRESS NOTES
Virtual Visit Details    Type of service:  Video Visit     Originating Location (pt. Location): Home    Distant Location (provider location):  Off-site  Platform used for Video Visit: Joturl  Video start time: 10:08 am  Video end time: 10:24 am

## 2024-08-19 NOTE — PATIENT INSTRUCTIONS
[facility]ariat[facility]  Nutritional Guidelines  RNY 12 Months Post Op    General Guidelines and Helpful Hints:  Eat 3 meals per day + protein supplement(s). No snacks between meals.  Do not skip meals.  This can cause overeating at the next meal and will prevent adequate protein and nutritional intake.  Aim for 60-80 grams of protein per day.  Always eat your protein first. This assists with optimal nutrition and helps you stay full longer.  Depending on your portion size, you may need to drink approved protein supplement between meals to achieve protein goals. Follow recommendations of your Dietitian.   Eat your protein first, and then follow with fiber.   It is not necessary to count your fiber, but 15-20 grams per day is recommended.    Add fiber by including fruits, vegetables, whole grains, and beans.   Portions should be about 1 cup per meal. Use measuring cups to be accurate.  Continue to use saucer/salad plates, infant/toddler silverware to keep portion sizes small and take small bites.  Eat S-L-O-W-L-Y to make each meal last 20-30 minutes. Always stop eating when satisfied.  Continue to use caution with foods containing skins, peels or membranes. Chew well!  Aim for 64 oz. of calorie-free fluids daily.  Continue to avoid caffeine and carbonation. If you choose to drink alcohol, do so in moderation.   Remember to avoid drinking during meals, 15-30 minutes before and 30 minutes after.  Exercise is chopra for continued weight loss and weight maintenance. Aim for 30-60 minutes of physical activity most days of the week. Include cardiovascular and strength training.  If having trouble tolerating meat, try using a crock-pot, tinfoil tent, steamer or other moist cooking method to create tender meats. Add broth or low-fat gravy to help meat stay moist.   Avoid high sugar and high fat foods to prevent high calorie intake. This will reduce your rate of weight loss and can cause weight regain.   Check nutrition  labels for less than 10 grams of sugar and less than 10 grams of fat per serving.  Continue Taking Vitamins/Minerals:  3870-0899 mcg of Sublingual B-12 daily  1 Multivitamin with Iron twice daily (chewable or swallow tabs)  500-600 mg Calcium Citrate twice daily (chewable or swallow tabs)  5000 IU Vitamin D3 daily  An additional iron tablet for menstruating females  You may need an additional thiamin or B complex tablet    Sample Grocery List    Protein:  Fat free Greek or light yogurt (less than 10 grams sugar)  Fat free or low-fat cottage cheese  String cheese or reduced fat cheese slices  Tuna, salmon, crab, egg, or chicken salad made with light or fat free mayonnaise  Egg or Egg Substitute  Lean/extra lean turkey, beef, bison, venison (ground, sirloin, round, flank)  Pork loin or tenderloin (grilled, baked, broiled)  Fish such as salmon, tuna, trout, tilapia, etc. (grilled, baked, broiled)  Tender cuts of lean (skinless) turkey or chicken  Lean deli meats: turkey, lean ham, chicken, lean roast beef  Beans such as kidney, garbanzo, black, guerrero, or low-fat/fat free refried beans  Peanut butter (natural preferred). Limit to 1 Tbsp. per day.  Low-fat meatloaf (made with lean ground beef or turkey)  Sloppy Joes made with low-sugar ketchup and lean ground beef or turkey  Soy or vegetable protein (i.e. vegan crumbles, soy/veggie burger, tofu)  Hummus    Vegetables:  Fresh: cooked or raw (as tolerated)  Frozen vegetables  Canned vegetables (low sodium or no salt added, rinse before cooking/eating)  (Ok to have skins/peels/membranes/seeds - just chew well)    Fruits:  Fresh fruit  Frozen fruit (no sugar added)  Canned fruit (packed in its own juice, NOT syrup)  (Ok to have skins/peels/membranes/seeds - just chew well)    Starch:  Unsweetened whole-grain hot cereal (or high fiber cold cereal, dry)  Toasted whole wheat bread or Morrow Thins  Whole grain crackers  Baked/boiled/mashed potato/sweet potato  Cooked whole  grain pasta, brown rice, or other cooked whole grains  Starchy vegetables: corn, peas, winter squash    Protein Supplement:   Ready to drink protein shake with:  15-30 grams protein per serving  Less than 10 grams total carbohydrate per serving   Protein powder mixed with:   Skim or 1% milk  Low fat or fat free Lactaid milk, plain or no sugar added soymilk  Water     Fats: (use in moderation)  1 teaspoon of soft tub margarine  1 teaspoon olive oil, canola oil, or peanut oil  1 tablespoon of low-fat dhillon or salad dressing     Sample Menu for 12 months after Gastric Sleeve    You do NOT need to eat/drink the full portion sizes listed below  Always stop when you are satisfied    Breakfast   cup 1% cottage cheese     cup diced peaches   Lunch   slice whole grain bread/toast with 1 tsp. light dhillon  2 oz. sliced lean turkey, ham, or chicken    cup carrots   Supplement Approved protein supplement (as needed between meals)   Dinner   cup 93% lean ground beef mixed with 2 Tbsp. marinara sauce     cup green beans    cup whole grain pasta     Breakfast   cup egg substitute or 2 egg whites, scrambled   1 oz low fat shredded cheese    cup sautéed chopped vegetables mixed in   Lunch 1 cup chili made with lean ground beef or turkey   Supplement 6 oz light Greek yogurt (as needed)   Dinner 3 oz  grilled, broiled, or baked lemon pepper salmon  2 Tbsp. grilled asparagus  2 Tbsp. baked sweet potato     Breakfast   cup whole grain oatmeal made with skim milk and unflavored protein powder added    cup blueberries       Lunch 3 oz  meatloaf made with lean ground turkey    cup steamed broccoli   Dinner 3 oz pork loin made in a crock pot seasoned with a spice rub    cup cooked carrots   Supplement Approved protein supplement (as needed between meals)     Breakfast   cup egg scramble made with egg substitute and turkey sausage    whole grain English muffin with 1 teaspoon low sugar jelly   Lunch 3 oz seasoned, skinless grilled chicken      cup grilled vegetables   Dinner 2 oz lean beef    cup brown rice    cup strawberries   Supplement 1 string cheese (as needed)     Breakfast 6 ounces light Greek yogurt    cup unsweetened mixed berries   Lunch 3 oz shrimp, with low-sugar cocktail sauce for dipping    cup pea pods   Supplement   cup fat free cottage cheese (as needed)   Dinner 3 oz tender turkey breast (made in crock pot for extra moisture)    of a small whole wheat dinner roll     Breakfast     cup low fat cottage cheese    cup strawberries   Lunch   cup black bean soup  4 whole grain crackers   Supplement 1 cup skim milk with scoop of protein powder added (as needed)   Dinner 3 oz. grilled tilapia with lemon pepper seasoning    cup grilled bell peppers     Breakfast 2 ounces turkey sausage svitlana    whole wheat English muffin   Supplement Approved protein supplement (as needed between meals)   Lunch 3 oz lean ham, turkey, or chicken     cup tomatoes   Dinner 2 oz. sirloin steak    cup mixed vegetables    cup brown rice

## 2024-08-19 NOTE — NURSING NOTE
Current patient location: [address] [place] [address] N   [facility] [address]    Is the patient currently in the state of [place]? YES    Visit mode:VIDEO    If the visit is dropped, the patient can be reconnected by: VIDEO VISIT: Text to cell phone:   Telephone Information:   Mobile [phone]       Will anyone else be joining the visit? NO  (If patient encounters technical issues they should call [phone])    How would you like to obtain your AVS? MyChart    Are changes needed to the allergy or medication list? Yes pt is taking calcium D3 400mg calcium, 500iu D3    Are refills needed on medications prescribed by this physician? YES    Reason for visit: RECHECK    Yodit PARDO

## 2024-08-19 NOTE — LETTER
8/19/2024      Ernestina Merlos  [address]  [facility] [address]      Dear Colleague,    Thank you for referring your patient, Ernestina Merlos, to the [facility] [place]. Please see a copy of my visit note below.    Bariatric Care Clinic Follow Up Visit for Previous Bariatric Surgery   Date of visit: 8/19/2024  Physician: EVITA Mccoy MD, MD  Primary Care is Deann Luciano  Ernestina Merlos   34 year old  female    Date of Surgery: 8/22/23  Initial Weight: 328#  Initial BMI: 52.9  Today's Weight:   Wt Readings from Last 1 Encounters:   08/19/24 107 kg (236 lb)     Body mass index is 38.09 kg/m .       Assessment and Plan   Assessment: Ernestina is a 34 year old year old female who is 1 year s/p RNY gastric bypass with Dr. Kim. They have had a durable weight loss of 92 lbs since surgery.  Overall compliance with the [facility] Bariatric Surgery Program has been excellent.      Plan:    1. Postoperative malabsorption  Patient is taking all vitamins as directed.  Recent routine postoperative labs were reviewed. She will try to get 2 calcium tablets in daily.   She is aware that she should wait until she is 2 years post op to attempt pregnancy. She was reminded to slow down, chew foods thoroughly, and to avoid liquids within 30 minutes of solids. Written information was given. She was congratulated on her success thus far.          Total time spent on the date of this encounter doing: chart review, review of test results, patient visit, physical exam, education, counseling, developing plan of care and documenting = 33 minutes.      Bariatric Surgery Review       Patient Concerns: random waves of extreme fatigue, often associated with a hot flash, once a month, she is talking about getting pregnant with her partner    Medication changes: no recent    Meals: B: (late) leftovers or sandwich  D: protein and starch or vegetables  Snack: protein shake throughout the  "day    Vitamin Intake:   Multivitamin   2 per day   Vitamin D  5000 IU   Calcium  One pill a couple of times a week   Vit. B-12    SL     Soda: 8-16 oz every 4-5 days    Habits:            Alcohol Intake  occasional   NSAID Use  none   Caffeine Use  occasional   Exercise  Walking 3-4 x per week for 30 minutes   CPAP Use:  Not needed   Birth Control  Mirena IUD   Tobacco Use     none                                      LABS: PTH high, o/w nl      LABS:  Lab Results   Component Value Date    WBC 6.3 08/09/2024    HGB 13.7 08/09/2024    HCT 41.8 08/09/2024     08/09/2024     08/09/2024      No components found for: \"[id number]\" No results found for: \"HGBA1C\"   Lab Results   Component Value Date    CHOL 136 01/30/2024    No components found for: \"PTH\"      No components found for: \"FERRITIN\"   Lab Results   Component Value Date    HDL 50 01/30/2024      No components found for: \"QTLZBAKP96\" No components found for: \"97265\"   No components found for: \"LDLCALC\" Lab Results   Component Value Date    TSH 1.58 04/04/2023    No components found for: \"FOLATE\"   Lab Results   Component Value Date    TRIG 78 01/30/2024    Lab Results   Component Value Date    ALT 31 08/09/2024    AST 23 08/09/2024    ALKPHOS 110 08/09/2024    No results found for: \"TESTOSTERONE\"     No results found for: \"CHOLHDL\" No components found for: \"7597\"   @resusfast(vitamin a: 1)@             Patient Profile   Social History     Social History Narrative     Not on file        Past Medical History   Past Medical History:   Diagnosis Date     ADHD      Anxiety      Arthritis      Chicken pox 01/01/1997     Chronic bronchitis (H)      Depression      Dysmenorrhea      GERD (gastroesophageal reflux disease)      Hiatal hernia      Microscopic colitis      Panic attack      Raynaud disease      Shortness of breath      Sleep apnea     uses CPAP     Vitamin D deficiency      Patient Active Problem List   Diagnosis     Morbid obesity (H)     " Calculus of gallbladder without cholecystitis without obstruction     Gastroesophageal reflux disease without esophagitis     Psoriatic arthritis (H)     Morbid (severe) obesity due to excess calories (H)     [unfilled]    Past Surgical History  She has a past surgical history that includes Us Aspiration Hematoma Seroma Or Fluid Collection (03/12/2020); LAP,CHOLECYSTECTOMY/EXPLORE (N/A, 04/28/2021); arthoscopy knee (10/20/2020); Uvulopalatopharyngoplasty (09/24/2021); and Creation, Gastric Bypass, Jesse-En-Y, Laparoscopic (N/A, 8/22/2023).     Examination   Wt 107 kg (236 lb)   BMI 38.09 kg/m      Wt Readings from Last 4 Encounters:   08/19/24 107 kg (236 lb)   02/07/24 112.9 kg (249 lb)   09/01/23 135.6 kg (299 lb)   08/22/23 140.6 kg (310 lb)      BP Readings from Last 3 Encounters:   08/23/23 112/70   06/22/23 (!) 142/90   04/04/23 136/80      GENERAL: alert and no distress  EYES: Eyes grossly normal to inspection.  No discharge or erythema, or obvious scleral/conjunctival abnormalities.  RESP: No audible wheeze, cough, or visible cyanosis.    SKIN: Visible skin clear. No significant rash, abnormal pigmentation or lesions.  NEURO: Cranial nerves grossly intact.  Mentation and speech appropriate for age.  PSYCH: Appropriate affect, tone, and pace of words          Counseling:   We reviewed the important post op bariatric recommendations:  -eating 3 meals daily  -eating protein first, getting >60gm protein daily  -eating slowly, chewing food well  -avoiding/limiting calorie containing beverages  -drinking water 15-30 minutes before or after meals  -choosing wheat, not white with breads, crackers, pastas, leidy, bagels, tortillas, rice  -limiting restaurant or cafeteria eating to twice a week or less    We discussed the importance of restorative sleep and stress management in maintaining a healthy weight.  We discussed the National Weight Control Registry healthy weight maintenance strategies and ways to  optimize metabolism.  We discussed the importance of physical activity including cardiovascular and strength training in maintaining a healthier weight.  We discussed the importance of life-long vitamin supplementation and life-long  follow-up.    Ernestina was reminded that, to avoid marginal ulcers she should avoid tobacco at all, alcohol in excess, caffeine in excess, and NSAIDS (unless indicated for cardioprotection or othewise and opposed by a PPI).    EVITA Mccoy MD  [facility] Bariat[facility]  8/15/2024  12:05 PM            No images are attached to the encounter.      Virtual Visit Details    Type of service:  Video Visit     Originating Location (pt. Location): Home    Distant Location (provider location):  Off-site  Platform used for Video Visit: LeanKit  Video start time: 10:08 am  Video end time: 10:24 am        Again, thank you for allowing me to participate in the care of your patient.        Sincerely,        EVITA Mccoy MD

## 2024-09-28 ENCOUNTER — HEALTH MAINTENANCE LETTER (OUTPATIENT)
Age: 34
End: 2024-09-28

## 2025-02-13 NOTE — PROGRESS NOTES
Ernestina Merlos is a 34 year old who is being evaluated via a billable video visit.      How would you like to obtain your AVS? MyChart  If the video visit is dropped, the invitation should be resent by: Text to cell phone: [phone]  Will anyone else be joining your video visit? No  {If patient encounters technical issues they should call [phone]      Post-op Surgical Weight Loss Diet Evaluation     Assessment:  Pt presents for 18 Month post-op RD visit, s/p RNY on 8/22/2023 with Dr. Kim. Today we reviewed current eating habits and level of physical activity, and instructed on the changes that are required for successful bariatric outcomes.    Patient Progress: Patient stated that things have been going well for her. Patient has been focusing on her protein intake and limiting starches. Patient has been trying to have three meals per day - sometimes having two meals and a snack. Discussed adding more fibrous items to her lunch.     Initial weight: 223 lbs  Current weight: 328 lbs  Weight change: 105 lbs, 32% weight loss    BMI: 35.99    Patient Active Problem List   Diagnosis    Morbid obesity (H)    Calculus of gallbladder without cholecystitis without obstruction    Gastroesophageal reflux disease without esophagitis    Psoriatic arthritis (H)    Morbid (severe) obesity due to excess calories (H)   Diabetes: no, A1C of 5.2% on 8/9/2024    Vitamins   Multi Vit with Iron: yes  Calcium Citrate: yes  B12: yes  D3: yes  Collagen supplement - vital proteins    Do you experience any reflux or discomfort with eating? None  Nausea: no  Vomiting: no  Diarrhea: no  Constipation: no - going every 1-3 days    Diet Recall/Time:   *does not like fish/seafood   Breakfast: protein shake OR eggs and sausage  Lunch: snack/small meal - string cheese  Dinner: pot roast OR beef stew OR a meal at the restaurant    Typical Snacks: limited    Proteins/Veg/Fruits/CHO (NOT well tolerated): starchy foods (pasta/potatoes),  "high sugar foods    Meal Duration:~30 minutes and chewing foods well    Fluid-meal separation:  Fluids are not  30min before and 30 minutes after meals - patient has been struggling with this - has been mindful of feeling hunger and fullness.    Fluid Intake  Water: 40 ounce bottle - ~1 bottle  Caffeine: espresso OR [employer] drink 3x/week  Carbonation: 2x/week    Exercise:   Treadmill - 1x/day  Limiting lifting d/t neck injury (disc issues) and going to PT at this time    PES statement:      (NC-1.4) Altered GI Function related to Alteration in gastrointestinal tract structure and/or function/ Decreased functional length of the GI tract as evidenced by Weight loss of 32% initial body weight; Gastric bypass surgery    Intervention    Discussion  Discussed 18 Months Post-Op Nutritional Guidelines for RNY  Recommended to consume 15-20gm protein at 3 meals daily, along with protein supplement/\"planned protein containing snack\" of 15-30gm protein, to reach goal of 60-80 gm protein daily.  Educated on post-op vitamin regimen: Multi Vit + iron 2x/day, calcium citrate 400-600 mg 2x/day, 1315-0130 mcg of Sublingual B-12 daily, and 5000 IU Vitamin D3 daily (MVI and calcium can be taken at the same time BID)  Reviewed lean protein sources  Bariatric Plate Method-  including lean/low fat protein at each meal, including a vegetable/fruit, and limiting carbohydrate intake to less than 25% of plate volume.     Instructions  Include 15-20gm protein at each meal, along with protein supplement/\"planned protein containing snack\" of 15-30gm protein, to reach goal of 60-80 gm protein daily.  Increase fluid intake to 64oz daily: choose plain or calorie/carbonation-free beverages.  Incorporate daily structured activity, 30-60 minutes most days of the week  Recommended pt to start taking: Multi Vit + iron 2x/day, calcium citrate 400-600 mg 2x/day, 1106-2107 mcg of Sublingual B-12 daily, and 5000 IU Vitamin D3 daily. (MVI and " calcium can be taken at the same time)  Read food labels more consistently: keeping total fat grams <10, total sugar grams <10, fiber >3gm per serving.  Increase vegetable/fruit intake, by having a vegetable or fruit with each meal daily.  Practice plate method: 1/2 plate lean/low fat protein source, vegetable/fruit, <25% of plate complex carbohydrates.  Separate fluids 30 minutes before/after meal times.  Practice eating off of smaller plates/bowls, chewing to applesauce consistency, taking 20-30 minutes to eat in a calm/relaxed environment without distractions of tv/email/cell phone.    Handouts provided:  18 Months and Beyond  Post-Op Nutritional Guidelines for RNY  Recipes    Assessment/Plan:    Pt to follow up for  2 Year  post-op visit with bariatrician     Video-Visit Details    Type of service:  Video Visit    Video Start Time (time video started):  10:00 AM    Video End Time (time video stopped):  10:16 AM    Originating Location (pt. Location): Home      Distant Location (provider location):  Off-site    Mode of Communication:  Video Conference via [facility]    Physician has received verbal consent for a Video Visit from the patient? Yes    Shanna Naidu RD

## 2025-02-19 ENCOUNTER — VIRTUAL VISIT (OUTPATIENT)
Dept: SURGERY | Facility: CLINIC | Age: 35
End: 2025-02-19
Payer: COMMERCIAL

## 2025-02-19 DIAGNOSIS — Z71.3 NUTRITIONAL COUNSELING: ICD-10-CM

## 2025-02-19 DIAGNOSIS — E66.9 OBESITY (BMI 30-39.9): Primary | ICD-10-CM

## 2025-02-19 DIAGNOSIS — Z98.84 HISTORY OF ROUX-EN-Y GASTRIC BYPASS: ICD-10-CM

## 2025-02-19 PROCEDURE — 97803 MED NUTRITION INDIV SUBSEQ: CPT | Mod: 95 | Performed by: DIETITIAN, REGISTERED

## 2025-02-19 NOTE — LETTER
2/19/2025      Ernestina Merlos  [address]  [facility] [address]      Dear Colleague,    Thank you for referring your patient, Ernestina Merlos, to the [facility] [place]. Please see a copy of my visit note below.    Ernestina Merlos is a 34 year old who is being evaluated via a billable video visit.      How would you like to obtain your AVS? MyChart  If the video visit is dropped, the invitation should be resent by: Text to cell phone: [phone]  Will anyone else be joining your video visit? No  {If patient encounters technical issues they should call [phone]      Post-op Surgical Weight Loss Diet Evaluation     Assessment:  Pt presents for 18 Month post-op RD visit, s/p RNY on 8/22/2023 with Dr. Kim. Today we reviewed current eating habits and level of physical activity, and instructed on the changes that are required for successful bariatric outcomes.    Patient Progress: Patient stated that things have been going well for her. Patient has been focusing on her protein intake and limiting starches. Patient has been trying to have three meals per day - sometimes having two meals and a snack. Discussed adding more fibrous items to her lunch.     Initial weight: 223 lbs  Current weight: 328 lbs  Weight change: 105 lbs, 32% weight loss    BMI: 35.99    Patient Active Problem List   Diagnosis     Morbid obesity (H)     Calculus of gallbladder without cholecystitis without obstruction     Gastroesophageal reflux disease without esophagitis     Psoriatic arthritis (H)     Morbid (severe) obesity due to excess calories (H)   Diabetes: no, A1C of 5.2% on 8/9/2024    Vitamins   Multi Vit with Iron: yes  Calcium Citrate: yes  B12: yes  D3: yes  Collagen supplement - vital proteins    Do you experience any reflux or discomfort with eating? None  Nausea: no  Vomiting: no  Diarrhea: no  Constipation: no - going every 1-3 days    Diet Recall/Time:   *does not like  "fish/seafood   Breakfast: protein shake OR eggs and sausage  Lunch: snack/small meal - string cheese  Dinner: pot roast OR beef stew OR a meal at the restaurant    Typical Snacks: limited    Proteins/Veg/Fruits/CHO (NOT well tolerated): starchy foods (pasta/potatoes), high sugar foods    Meal Duration:~30 minutes and chewing foods well    Fluid-meal separation:  Fluids are not  30min before and 30 minutes after meals - patient has been struggling with this - has been mindful of feeling hunger and fullness.    Fluid Intake  Water: 40 ounce bottle - ~1 bottle  Caffeine: espresso OR [employer] drink 3x/week  Carbonation: 2x/week    Exercise:   Treadmill - 1x/day  Limiting lifting d/t neck injury (disc issues) and going to PT at this time    PES statement:      (NC-1.4) Altered GI Function related to Alteration in gastrointestinal tract structure and/or function/ Decreased functional length of the GI tract as evidenced by Weight loss of 32% initial body weight; Gastric bypass surgery    Intervention    Discussion  Discussed 18 Months Post-Op Nutritional Guidelines for RNY  Recommended to consume 15-20gm protein at 3 meals daily, along with protein supplement/\"planned protein containing snack\" of 15-30gm protein, to reach goal of 60-80 gm protein daily.  Educated on post-op vitamin regimen: Multi Vit + iron 2x/day, calcium citrate 400-600 mg 2x/day, 5487-3561 mcg of Sublingual B-12 daily, and 5000 IU Vitamin D3 daily (MVI and calcium can be taken at the same time BID)  Reviewed lean protein sources  Bariatric Plate Method-  including lean/low fat protein at each meal, including a vegetable/fruit, and limiting carbohydrate intake to less than 25% of plate volume.     Instructions  Include 15-20gm protein at each meal, along with protein supplement/\"planned protein containing snack\" of 15-30gm protein, to reach goal of 60-80 gm protein daily.  Increase fluid intake to 64oz daily: choose plain or " calorie/carbonation-free beverages.  Incorporate daily structured activity, 30-60 minutes most days of the week  Recommended pt to start taking: Multi Vit + iron 2x/day, calcium citrate 400-600 mg 2x/day, 9372-4209 mcg of Sublingual B-12 daily, and 5000 IU Vitamin D3 daily. (MVI and calcium can be taken at the same time)  Read food labels more consistently: keeping total fat grams <10, total sugar grams <10, fiber >3gm per serving.  Increase vegetable/fruit intake, by having a vegetable or fruit with each meal daily.  Practice plate method: 1/2 plate lean/low fat protein source, vegetable/fruit, <25% of plate complex carbohydrates.  Separate fluids 30 minutes before/after meal times.  Practice eating off of smaller plates/bowls, chewing to applesauce consistency, taking 20-30 minutes to eat in a calm/relaxed environment without distractions of tv/email/cell phone.    Handouts provided:  18 Months and Beyond  Post-Op Nutritional Guidelines for RNY  Recipes    Assessment/Plan:    Pt to follow up for  2 Year  post-op visit with bariatrician     Video-Visit Details    Type of service:  Video Visit    Video Start Time (time video started):  10:00 AM    Video End Time (time video stopped):  10:16 AM    Originating Location (pt. Location): Home    {PROVIDER LOCATION On-site should be selected for visits conducted from your clinic location or adjoining [facility] hospital, academic office, or other nearby [facility] building. Off-site should be selected for all other provider locations, including home:[id number]}  Distant Location (provider location):  Off-site    Mode of Communication:  Video Conference via [facility]    Physician has received verbal consent for a Video Visit from the patient? Yes    Shanna Naidu RD             Again, thank you for allowing me to participate in the care of your patient.        Sincerely,        Shanna Naidu RD    Electronically signed

## 2025-07-23 ENCOUNTER — LAB REQUISITION (OUTPATIENT)
Dept: LAB | Facility: CLINIC | Age: 35
End: 2025-07-23
Payer: COMMERCIAL

## 2025-07-23 DIAGNOSIS — Z11.3 ENCOUNTER FOR SCREENING FOR INFECTIONS WITH A PREDOMINANTLY SEXUAL MODE OF TRANSMISSION: ICD-10-CM

## 2025-07-23 PROCEDURE — 86803 HEPATITIS C AB TEST: CPT | Mod: ORL | Performed by: PHYSICIAN ASSISTANT

## 2025-07-23 PROCEDURE — 87389 HIV-1 AG W/HIV-1&-2 AB AG IA: CPT | Mod: ORL | Performed by: PHYSICIAN ASSISTANT

## 2025-07-23 PROCEDURE — 87591 N.GONORRHOEAE DNA AMP PROB: CPT | Mod: ORL | Performed by: PHYSICIAN ASSISTANT

## 2025-07-23 PROCEDURE — 86780 TREPONEMA PALLIDUM: CPT | Mod: ORL | Performed by: PHYSICIAN ASSISTANT

## 2025-07-24 LAB
C TRACH DNA SPEC QL PROBE+SIG AMP: NEGATIVE
HCV AB SERPL QL IA: NONREACTIVE
HIV 1+2 AB+HIV1 P24 AG SERPL QL IA: NONREACTIVE
N GONORRHOEA DNA SPEC QL NAA+PROBE: NEGATIVE
SPECIMEN TYPE: NORMAL
T PALLIDUM AB SER QL: NONREACTIVE

## 2025-07-30 ENCOUNTER — MYC MEDICAL ADVICE (OUTPATIENT)
Dept: SURGERY | Facility: CLINIC | Age: 35
End: 2025-07-30
Payer: COMMERCIAL

## 2025-07-30 DIAGNOSIS — Z98.84 HISTORY OF ROUX-EN-Y GASTRIC BYPASS: Primary | ICD-10-CM

## 2025-07-30 DIAGNOSIS — K90.9 INTESTINAL MALABSORPTION, UNSPECIFIED TYPE: ICD-10-CM

## 2025-08-12 ENCOUNTER — LAB (OUTPATIENT)
Dept: LAB | Facility: HOSPITAL | Age: 35
End: 2025-08-12
Payer: COMMERCIAL

## 2025-08-12 DIAGNOSIS — K90.9 INTESTINAL MALABSORPTION, UNSPECIFIED TYPE: ICD-10-CM

## 2025-08-12 DIAGNOSIS — Z98.84 HISTORY OF ROUX-EN-Y GASTRIC BYPASS: ICD-10-CM

## 2025-08-12 LAB
ALBUMIN SERPL BCG-MCNC: 4.1 G/DL (ref 3.5–5.2)
ALP SERPL-CCNC: 70 U/L (ref 40–150)
ALT SERPL W P-5'-P-CCNC: 47 U/L (ref 0–50)
ANION GAP SERPL CALCULATED.3IONS-SCNC: 13 MMOL/L (ref 7–15)
AST SERPL W P-5'-P-CCNC: 21 U/L (ref 0–45)
BILIRUB SERPL-MCNC: 0.5 MG/DL
BUN SERPL-MCNC: 10.7 MG/DL (ref 6–20)
CALCIUM SERPL-MCNC: 9.4 MG/DL (ref 8.8–10.4)
CHLORIDE SERPL-SCNC: 105 MMOL/L (ref 98–107)
CHOLEST SERPL-MCNC: 145 MG/DL
CREAT SERPL-MCNC: 0.8 MG/DL (ref 0.51–0.95)
EGFRCR SERPLBLD CKD-EPI 2021: >90 ML/MIN/1.73M2
ERYTHROCYTE [DISTWIDTH] IN BLOOD BY AUTOMATED COUNT: 12.1 % (ref 10–15)
EST. AVERAGE GLUCOSE BLD GHB EST-MCNC: 97 MG/DL
FASTING STATUS PATIENT QL REPORTED: YES
FASTING STATUS PATIENT QL REPORTED: YES
FERRITIN SERPL-MCNC: 112 NG/ML (ref 6–175)
FOLATE SERPL-MCNC: 7.9 NG/ML (ref 4.6–34.8)
GLUCOSE SERPL-MCNC: 90 MG/DL (ref 70–99)
HBA1C MFR BLD: 5 %
HCO3 SERPL-SCNC: 22 MMOL/L (ref 22–29)
HCT VFR BLD AUTO: 37.2 % (ref 35–47)
HDLC SERPL-MCNC: 56 MG/DL
HGB BLD-MCNC: 12.9 G/DL (ref 11.7–15.7)
LDLC SERPL CALC-MCNC: 71 MG/DL
MCH RBC QN AUTO: 32.8 PG (ref 26.5–33)
MCHC RBC AUTO-ENTMCNC: 34.7 G/DL (ref 31.5–36.5)
MCV RBC AUTO: 95 FL (ref 78–100)
NONHDLC SERPL-MCNC: 89 MG/DL
PLATELET # BLD AUTO: 243 10E3/UL (ref 150–450)
POTASSIUM SERPL-SCNC: 3.9 MMOL/L (ref 3.4–5.3)
PROT SERPL-MCNC: 6.8 G/DL (ref 6.4–8.3)
PTH-INTACT SERPL-MCNC: 73 PG/ML (ref 15–65)
RBC # BLD AUTO: 3.93 10E6/UL (ref 3.8–5.2)
SODIUM SERPL-SCNC: 140 MMOL/L (ref 135–145)
TRIGL SERPL-MCNC: 88 MG/DL
VIT B12 SERPL-MCNC: 718 PG/ML (ref 232–1245)
WBC # BLD AUTO: 6.7 10E3/UL (ref 4–11)

## 2025-08-12 PROCEDURE — 82746 ASSAY OF FOLIC ACID SERUM: CPT

## 2025-08-12 PROCEDURE — 84590 ASSAY OF VITAMIN A: CPT

## 2025-08-12 PROCEDURE — 84425 ASSAY OF VITAMIN B-1: CPT

## 2025-08-12 PROCEDURE — 83036 HEMOGLOBIN GLYCOSYLATED A1C: CPT

## 2025-08-12 PROCEDURE — 85027 COMPLETE CBC AUTOMATED: CPT

## 2025-08-12 PROCEDURE — 84478 ASSAY OF TRIGLYCERIDES: CPT

## 2025-08-12 PROCEDURE — 84155 ASSAY OF PROTEIN SERUM: CPT

## 2025-08-12 PROCEDURE — 36415 COLL VENOUS BLD VENIPUNCTURE: CPT

## 2025-08-12 PROCEDURE — 82728 ASSAY OF FERRITIN: CPT

## 2025-08-12 PROCEDURE — 82607 VITAMIN B-12: CPT

## 2025-08-12 PROCEDURE — 84630 ASSAY OF ZINC: CPT

## 2025-08-12 PROCEDURE — 83970 ASSAY OF PARATHORMONE: CPT

## 2025-08-13 LAB — ZINC SERPL-MCNC: 68.3 UG/DL

## 2025-08-14 LAB
ANNOTATION COMMENT IMP: NORMAL
RETINYL PALMITATE SERPL-MCNC: <0.02 MG/L
VIT A SERPL-MCNC: 0.57 MG/L
VIT B1 PYROPHOSHATE BLD-SCNC: 130 NMOL/L

## 2025-08-16 LAB
DEPRECATED CALCIDIOL+CALCIFEROL SERPL-MC: <41 UG/L (ref 20–75)
VITAMIN D2 SERPL-MCNC: <5 UG/L
VITAMIN D3 SERPL-MCNC: 36 UG/L

## 2025-08-20 ENCOUNTER — VIRTUAL VISIT (OUTPATIENT)
Dept: SURGERY | Facility: CLINIC | Age: 35
End: 2025-08-20
Payer: COMMERCIAL

## 2025-08-20 VITALS — WEIGHT: 224 LBS | HEIGHT: 66 IN | BODY MASS INDEX: 36 KG/M2

## 2025-08-20 DIAGNOSIS — K91.2 POSTOPERATIVE MALABSORPTION: Primary | ICD-10-CM

## 2025-08-20 PROCEDURE — 1126F AMNT PAIN NOTED NONE PRSNT: CPT | Mod: 95 | Performed by: FAMILY MEDICINE

## 2025-08-20 PROCEDURE — 98005 SYNCH AUDIO-VIDEO EST LOW 20: CPT | Performed by: FAMILY MEDICINE

## 2025-08-20 ASSESSMENT — PAIN SCALES - GENERAL: PAINLEVEL_OUTOF10: NO PAIN (0)

## (undated) DEVICE — STPL RELOAD REG TISSUE ECHELON 60 X 3.6MM BLUE GST60B

## (undated) DEVICE — BANDAGE ADH LF 1X3 ABN3100A

## (undated) DEVICE — CUSTOM PACK LAP CHOLE SBA5BLCHEA

## (undated) DEVICE — ENDO TROCAR FIRST ENTRY KII FIOS Z-THRD 12X100MM CTF73

## (undated) DEVICE — GOWN IMPERVIOUS BREATHABLE SMART LG 89015

## (undated) DEVICE — SUTURE VICRYL+ 2-0 27 VIO VCP317H

## (undated) DEVICE — STPL POWERED ECHELON LONG 60MM PLEE60A

## (undated) DEVICE — ENDO TROCAR OPTICAL ACCESS KII Z-THRD 05X100MM CTR03

## (undated) DEVICE — Device

## (undated) DEVICE — ENDO TROCAR SLEEVE KII Z-THREADED 12X100MM CTS22

## (undated) DEVICE — TUBING SMOKE EVAC PNEUMOCLEAR HIGH FLOW 0620050250

## (undated) DEVICE — SOL NACL 0.9% IRRIG 1000ML BOTTLE 2F7124

## (undated) DEVICE — GLOVE BIOGEL PI ULTRATOUCH G SZ 6.5 42165

## (undated) DEVICE — BLADE CLIPPER PIVOT PURPLE DISP 9660

## (undated) DEVICE — SYR 50ML CATH TIP W/O NDL 309620

## (undated) DEVICE — ESU HARMONIC ACE LAP SHEARS STRYKER ACE+ 7 5MMX45CM HARH45

## (undated) DEVICE — SUTURE VICRYL+ 4-0 UNDYED PS-2 VCP496H

## (undated) DEVICE — NEEDLE SPINAL DISP 22GA X 3.5" QUINCKE 333320

## (undated) DEVICE — SYR 30ML LL W/O NDL 302832

## (undated) DEVICE — DRAPE SURGICAL BARIATRIC 92INW X 110INW X 132INL 94590

## (undated) DEVICE — PREP CHLORAPREP 26ML TINTED HI-LITE ORANGE 930815

## (undated) DEVICE — ENDO SHEARS RENEW LAP ENDOCUT SCISSOR TIP 16.5MM 3142

## (undated) DEVICE — SYSTEM LAPAROVUE VISIBILITY LAPVUE10

## (undated) DEVICE — ENDO TROCAR SLEEVE KII Z-THREADED 05X100MM CTS02

## (undated) DEVICE — DRSG STERI STRIP 1/2X4" R1547

## (undated) DEVICE — GOWN IMPERVIOUS BREATHABLE SMART XLG 89045

## (undated) DEVICE — SU SILK 0 SH 30" K834H

## (undated) DEVICE — DRESSING BANDAID SURFING CAT ABN4075D

## (undated) DEVICE — SUTURE VICRYL+ 2-0 27IN SH UND VCP417H

## (undated) DEVICE — SOL WATER IRRIG 1000ML BOTTLE 2F7114

## (undated) DEVICE — TUBE COLON 32FR 30IN LNG 080074200

## (undated) DEVICE — GLOVE BIOGEL PI SZ 8.0 40880

## (undated) DEVICE — DRAPE SHEET REV FOLD 3/4 9349

## (undated) DEVICE — DECANTER VIAL 2006S

## (undated) RX ORDER — PROPOFOL 10 MG/ML
INJECTION, EMULSION INTRAVENOUS
Status: DISPENSED
Start: 2023-08-22

## (undated) RX ORDER — INDOCYANINE GREEN AND WATER 25 MG
KIT INJECTION
Status: DISPENSED
Start: 2023-08-22

## (undated) RX ORDER — BUPIVACAINE HYDROCHLORIDE 2.5 MG/ML
INJECTION, SOLUTION EPIDURAL; INFILTRATION; INTRACAUDAL
Status: DISPENSED
Start: 2023-08-22

## (undated) RX ORDER — DEXAMETHASONE SODIUM PHOSPHATE 10 MG/ML
INJECTION, SOLUTION INTRAMUSCULAR; INTRAVENOUS
Status: DISPENSED
Start: 2023-08-22

## (undated) RX ORDER — GLYCOPYRROLATE 0.2 MG/ML
INJECTION, SOLUTION INTRAMUSCULAR; INTRAVENOUS
Status: DISPENSED
Start: 2023-08-22

## (undated) RX ORDER — LIDOCAINE HYDROCHLORIDE 10 MG/ML
INJECTION, SOLUTION EPIDURAL; INFILTRATION; INTRACAUDAL; PERINEURAL
Status: DISPENSED
Start: 2023-08-22

## (undated) RX ORDER — ONDANSETRON 2 MG/ML
INJECTION INTRAMUSCULAR; INTRAVENOUS
Status: DISPENSED
Start: 2023-08-22